# Patient Record
Sex: FEMALE | Race: BLACK OR AFRICAN AMERICAN | NOT HISPANIC OR LATINO | Employment: OTHER | URBAN - NONMETROPOLITAN AREA
[De-identification: names, ages, dates, MRNs, and addresses within clinical notes are randomized per-mention and may not be internally consistent; named-entity substitution may affect disease eponyms.]

---

## 2023-05-19 ENCOUNTER — HOSPITAL ENCOUNTER (INPATIENT)
Facility: HOSPITAL | Age: 73
LOS: 3 days | Discharge: HOME | End: 2023-05-23
Attending: INTERNAL MEDICINE | Admitting: FAMILY MEDICINE
Payer: MEDICARE

## 2023-05-19 PROBLEM — J90 PLEURAL EFFUSION: Status: ACTIVE | Noted: 2023-05-19

## 2023-05-19 PROCEDURE — G0378 HOSPITAL OBSERVATION PER HR: HCPCS

## 2023-05-19 RX ORDER — ASPIRIN 81 MG/1
81 TABLET, CHEWABLE ORAL DAILY
COMMUNITY

## 2023-05-19 RX ORDER — DIPHENHYDRAMINE HCL 25 MG
25 CAPSULE ORAL DAILY
COMMUNITY

## 2023-05-19 RX ORDER — LISINOPRIL 2.5 MG/1
2.5 TABLET ORAL 2 TIMES DAILY
COMMUNITY

## 2023-05-19 RX ORDER — SODIUM CHLORIDE 0.9 % (FLUSH) 0.9 %
10 SYRINGE (ML) INJECTION EVERY 12 HOURS SCHEDULED
Status: DISCONTINUED | OUTPATIENT
Start: 2023-05-19 | End: 2023-05-23 | Stop reason: HOSPADM

## 2023-05-19 RX ORDER — BISACODYL 10 MG
10 SUPPOSITORY, RECTAL RECTAL DAILY PRN
Status: DISCONTINUED | OUTPATIENT
Start: 2023-05-19 | End: 2023-05-23 | Stop reason: HOSPADM

## 2023-05-19 RX ORDER — SODIUM CHLORIDE 0.9 % (FLUSH) 0.9 %
10 SYRINGE (ML) INJECTION AS NEEDED
Status: DISCONTINUED | OUTPATIENT
Start: 2023-05-19 | End: 2023-05-23 | Stop reason: HOSPADM

## 2023-05-19 RX ORDER — CLOPIDOGREL BISULFATE 75 MG/1
75 TABLET ORAL DAILY
COMMUNITY

## 2023-05-19 RX ORDER — MULTIVIT-MIN/IRON/FOLIC ACID/K 18-600-40
1 CAPSULE ORAL
COMMUNITY

## 2023-05-19 RX ORDER — LISINOPRIL 2.5 MG/1
2.5 TABLET ORAL 2 TIMES DAILY
Status: DISCONTINUED | OUTPATIENT
Start: 2023-05-19 | End: 2023-05-23 | Stop reason: HOSPADM

## 2023-05-19 RX ORDER — NICOTINE POLACRILEX 4 MG
15 LOZENGE BUCCAL
Status: DISCONTINUED | OUTPATIENT
Start: 2023-05-19 | End: 2023-05-23 | Stop reason: HOSPADM

## 2023-05-19 RX ORDER — OMEPRAZOLE 40 MG/1
40 CAPSULE, DELAYED RELEASE ORAL DAILY
COMMUNITY

## 2023-05-19 RX ORDER — CARVEDILOL 25 MG/1
25 TABLET ORAL 2 TIMES DAILY WITH MEALS
Status: DISCONTINUED | OUTPATIENT
Start: 2023-05-19 | End: 2023-05-23 | Stop reason: HOSPADM

## 2023-05-19 RX ORDER — BISACODYL 5 MG/1
5 TABLET, DELAYED RELEASE ORAL DAILY PRN
Status: DISCONTINUED | OUTPATIENT
Start: 2023-05-19 | End: 2023-05-23 | Stop reason: HOSPADM

## 2023-05-19 RX ORDER — PANTOPRAZOLE SODIUM 40 MG/1
40 TABLET, DELAYED RELEASE ORAL
Status: DISCONTINUED | OUTPATIENT
Start: 2023-05-20 | End: 2023-05-23 | Stop reason: HOSPADM

## 2023-05-19 RX ORDER — NALOXONE HCL 0.4 MG/ML
0.4 VIAL (ML) INJECTION
Status: DISCONTINUED | OUTPATIENT
Start: 2023-05-19 | End: 2023-05-23 | Stop reason: HOSPADM

## 2023-05-19 RX ORDER — INSULIN ASPART 100 [IU]/ML
0-7 INJECTION, SOLUTION INTRAVENOUS; SUBCUTANEOUS
Status: DISCONTINUED | OUTPATIENT
Start: 2023-05-20 | End: 2023-05-23 | Stop reason: HOSPADM

## 2023-05-19 RX ORDER — INSULIN LISPRO 100 [IU]/ML
6 INJECTION, SOLUTION INTRAVENOUS; SUBCUTANEOUS DAILY
COMMUNITY

## 2023-05-19 RX ORDER — MORPHINE SULFATE 2 MG/ML
1 INJECTION, SOLUTION INTRAMUSCULAR; INTRAVENOUS EVERY 4 HOURS PRN
Status: DISCONTINUED | OUTPATIENT
Start: 2023-05-19 | End: 2023-05-22

## 2023-05-19 RX ORDER — ONDANSETRON 2 MG/ML
4 INJECTION INTRAMUSCULAR; INTRAVENOUS EVERY 6 HOURS PRN
Status: DISCONTINUED | OUTPATIENT
Start: 2023-05-19 | End: 2023-05-23 | Stop reason: HOSPADM

## 2023-05-19 RX ORDER — ONDANSETRON 4 MG/1
4 TABLET, FILM COATED ORAL EVERY 6 HOURS PRN
Status: DISCONTINUED | OUTPATIENT
Start: 2023-05-19 | End: 2023-05-23 | Stop reason: HOSPADM

## 2023-05-19 RX ORDER — CARVEDILOL 25 MG/1
25 TABLET ORAL 2 TIMES DAILY WITH MEALS
COMMUNITY

## 2023-05-19 RX ORDER — AMOXICILLIN 250 MG
2 CAPSULE ORAL 2 TIMES DAILY
Status: DISCONTINUED | OUTPATIENT
Start: 2023-05-19 | End: 2023-05-23 | Stop reason: HOSPADM

## 2023-05-19 RX ORDER — POLYETHYLENE GLYCOL 3350 17 G/17G
17 POWDER, FOR SOLUTION ORAL DAILY PRN
Status: DISCONTINUED | OUTPATIENT
Start: 2023-05-19 | End: 2023-05-23 | Stop reason: HOSPADM

## 2023-05-19 RX ORDER — INSULIN GLARGINE 100 [IU]/ML
7 INJECTION, SOLUTION SUBCUTANEOUS DAILY
COMMUNITY

## 2023-05-19 RX ORDER — SODIUM CHLORIDE 9 MG/ML
40 INJECTION, SOLUTION INTRAVENOUS AS NEEDED
Status: DISCONTINUED | OUTPATIENT
Start: 2023-05-19 | End: 2023-05-23 | Stop reason: HOSPADM

## 2023-05-19 RX ORDER — DEXTROSE MONOHYDRATE 25 G/50ML
25 INJECTION, SOLUTION INTRAVENOUS
Status: DISCONTINUED | OUTPATIENT
Start: 2023-05-19 | End: 2023-05-23 | Stop reason: HOSPADM

## 2023-05-19 RX ADMIN — LISINOPRIL 1.25 MG: 2.5 TABLET ORAL at 22:40

## 2023-05-19 RX ADMIN — CARVEDILOL 25 MG: 25 TABLET, FILM COATED ORAL at 22:42

## 2023-05-19 RX ADMIN — DOCUSATE SODIUM 50 MG AND SENNOSIDES 8.6 MG 2 TABLET: 8.6; 5 TABLET, FILM COATED ORAL at 22:42

## 2023-05-19 RX ADMIN — Medication 10 ML: at 23:15

## 2023-05-20 ENCOUNTER — APPOINTMENT (OUTPATIENT)
Dept: GENERAL RADIOLOGY | Facility: HOSPITAL | Age: 73
End: 2023-05-20
Payer: MEDICARE

## 2023-05-20 ENCOUNTER — APPOINTMENT (OUTPATIENT)
Dept: CT IMAGING | Facility: HOSPITAL | Age: 73
End: 2023-05-20
Payer: MEDICARE

## 2023-05-20 LAB
ANION GAP SERPL CALCULATED.3IONS-SCNC: 16 MMOL/L (ref 5–15)
BASOPHILS # BLD AUTO: 0.08 10*3/MM3 (ref 0–0.2)
BASOPHILS NFR BLD AUTO: 1.3 % (ref 0–1.5)
BUN SERPL-MCNC: 54 MG/DL (ref 8–23)
BUN/CREAT SERPL: 18.8 (ref 7–25)
CALCIUM SPEC-SCNC: 9.2 MG/DL (ref 8.6–10.5)
CHLORIDE SERPL-SCNC: 106 MMOL/L (ref 98–107)
CO2 SERPL-SCNC: 17 MMOL/L (ref 22–29)
CREAT SERPL-MCNC: 2.87 MG/DL (ref 0.57–1)
DEPRECATED RDW RBC AUTO: 54.5 FL (ref 37–54)
EGFRCR SERPLBLD CKD-EPI 2021: 16.8 ML/MIN/1.73
EOSINOPHIL # BLD AUTO: 0.18 10*3/MM3 (ref 0–0.4)
EOSINOPHIL NFR BLD AUTO: 2.8 % (ref 0.3–6.2)
ERYTHROCYTE [DISTWIDTH] IN BLOOD BY AUTOMATED COUNT: 15.8 % (ref 12.3–15.4)
GLUCOSE BLDC GLUCOMTR-MCNC: 108 MG/DL (ref 70–130)
GLUCOSE BLDC GLUCOMTR-MCNC: 112 MG/DL (ref 70–130)
GLUCOSE BLDC GLUCOMTR-MCNC: 128 MG/DL (ref 70–130)
GLUCOSE BLDC GLUCOMTR-MCNC: 131 MG/DL (ref 70–130)
GLUCOSE FLD-MCNC: 163 MG/DL
GLUCOSE SERPL-MCNC: 156 MG/DL (ref 65–99)
HBV SURFACE AG SERPL QL IA: NORMAL
HCT VFR BLD AUTO: 34.1 % (ref 34–46.6)
HGB BLD-MCNC: 11.5 G/DL (ref 12–15.9)
IMM GRANULOCYTES # BLD AUTO: 0.03 10*3/MM3 (ref 0–0.05)
IMM GRANULOCYTES NFR BLD AUTO: 0.5 % (ref 0–0.5)
LDH FLD-CCNC: 76 U/L
LYMPHOCYTES # BLD AUTO: 1.99 10*3/MM3 (ref 0.7–3.1)
LYMPHOCYTES NFR BLD AUTO: 31.1 % (ref 19.6–45.3)
MAGNESIUM SERPL-MCNC: 1.8 MG/DL (ref 1.6–2.4)
MCH RBC QN AUTO: 31.8 PG (ref 26.6–33)
MCHC RBC AUTO-ENTMCNC: 33.7 G/DL (ref 31.5–35.7)
MCV RBC AUTO: 94.2 FL (ref 79–97)
MONOCYTES # BLD AUTO: 0.66 10*3/MM3 (ref 0.1–0.9)
MONOCYTES NFR BLD AUTO: 10.3 % (ref 5–12)
NEUTROPHILS NFR BLD AUTO: 3.46 10*3/MM3 (ref 1.7–7)
NEUTROPHILS NFR BLD AUTO: 54 % (ref 42.7–76)
NRBC BLD AUTO-RTO: 0 /100 WBC (ref 0–0.2)
PLATELET # BLD AUTO: 292 10*3/MM3 (ref 140–450)
PMV BLD AUTO: 10.2 FL (ref 6–12)
POTASSIUM SERPL-SCNC: 4.4 MMOL/L (ref 3.5–5.2)
PROT FLD-MCNC: 1.2 G/DL
RBC # BLD AUTO: 3.62 10*6/MM3 (ref 3.77–5.28)
SODIUM SERPL-SCNC: 139 MMOL/L (ref 136–145)
WBC NRBC COR # BLD: 6.4 10*3/MM3 (ref 3.4–10.8)

## 2023-05-20 PROCEDURE — 83615 LACTATE (LD) (LDH) ENZYME: CPT | Performed by: INTERNAL MEDICINE

## 2023-05-20 PROCEDURE — 63710000001 INSULIN DETEMIR PER 5 UNITS: Performed by: FAMILY MEDICINE

## 2023-05-20 PROCEDURE — 82948 REAGENT STRIP/BLOOD GLUCOSE: CPT

## 2023-05-20 PROCEDURE — 71250 CT THORAX DX C-: CPT

## 2023-05-20 PROCEDURE — 99222 1ST HOSP IP/OBS MODERATE 55: CPT | Performed by: NURSE PRACTITIONER

## 2023-05-20 PROCEDURE — 84157 ASSAY OF PROTEIN OTHER: CPT | Performed by: INTERNAL MEDICINE

## 2023-05-20 PROCEDURE — 80048 BASIC METABOLIC PNL TOTAL CA: CPT | Performed by: FAMILY MEDICINE

## 2023-05-20 PROCEDURE — 25010000002 HYDROMORPHONE 1 MG/ML SOLUTION

## 2023-05-20 PROCEDURE — 25010000002 HYDRALAZINE PER 20 MG: Performed by: INTERNAL MEDICINE

## 2023-05-20 PROCEDURE — 82945 GLUCOSE OTHER FLUID: CPT | Performed by: INTERNAL MEDICINE

## 2023-05-20 PROCEDURE — G0257 UNSCHED DIALYSIS ESRD PT HOS: HCPCS

## 2023-05-20 PROCEDURE — 83735 ASSAY OF MAGNESIUM: CPT | Performed by: FAMILY MEDICINE

## 2023-05-20 PROCEDURE — 0W9930Z DRAINAGE OF RIGHT PLEURAL CAVITY WITH DRAINAGE DEVICE, PERCUTANEOUS APPROACH: ICD-10-PCS | Performed by: INTERNAL MEDICINE

## 2023-05-20 PROCEDURE — 25010000002 MORPHINE PER 10 MG: Performed by: FAMILY MEDICINE

## 2023-05-20 PROCEDURE — 87340 HEPATITIS B SURFACE AG IA: CPT | Performed by: INTERNAL MEDICINE

## 2023-05-20 PROCEDURE — 85025 COMPLETE CBC W/AUTO DIFF WBC: CPT | Performed by: FAMILY MEDICINE

## 2023-05-20 PROCEDURE — 71045 X-RAY EXAM CHEST 1 VIEW: CPT

## 2023-05-20 PROCEDURE — 32551 INSERTION OF CHEST TUBE: CPT | Performed by: INTERNAL MEDICINE

## 2023-05-20 PROCEDURE — 5A1D70Z PERFORMANCE OF URINARY FILTRATION, INTERMITTENT, LESS THAN 6 HOURS PER DAY: ICD-10-PCS | Performed by: INTERNAL MEDICINE

## 2023-05-20 RX ORDER — HYDRALAZINE HYDROCHLORIDE 20 MG/ML
20 INJECTION INTRAMUSCULAR; INTRAVENOUS EVERY 6 HOURS PRN
Status: DISCONTINUED | OUTPATIENT
Start: 2023-05-20 | End: 2023-05-23 | Stop reason: HOSPADM

## 2023-05-20 RX ORDER — HYDRALAZINE HYDROCHLORIDE 20 MG/ML
20 INJECTION INTRAMUSCULAR; INTRAVENOUS EVERY 6 HOURS PRN
Status: DISCONTINUED | OUTPATIENT
Start: 2023-05-20 | End: 2023-05-20

## 2023-05-20 RX ORDER — HYDRALAZINE HYDROCHLORIDE 20 MG/ML
20 INJECTION INTRAMUSCULAR; INTRAVENOUS ONCE
Status: COMPLETED | OUTPATIENT
Start: 2023-05-20 | End: 2023-05-20

## 2023-05-20 RX ORDER — LIDOCAINE HYDROCHLORIDE 10 MG/ML
INJECTION, SOLUTION EPIDURAL; INFILTRATION; INTRACAUDAL; PERINEURAL
Status: DISPENSED
Start: 2023-05-20 | End: 2023-05-21

## 2023-05-20 RX ADMIN — LISINOPRIL 2.5 MG: 2.5 TABLET ORAL at 20:57

## 2023-05-20 RX ADMIN — MORPHINE SULFATE 1 MG: 2 INJECTION, SOLUTION INTRAMUSCULAR; INTRAVENOUS at 22:12

## 2023-05-20 RX ADMIN — CARVEDILOL 25 MG: 25 TABLET, FILM COATED ORAL at 08:50

## 2023-05-20 RX ADMIN — HYDROMORPHONE HYDROCHLORIDE 1 MG: 1 INJECTION, SOLUTION INTRAMUSCULAR; INTRAVENOUS; SUBCUTANEOUS at 13:55

## 2023-05-20 RX ADMIN — LISINOPRIL 2.5 MG: 2.5 TABLET ORAL at 08:50

## 2023-05-20 RX ADMIN — Medication 10 ML: at 21:10

## 2023-05-20 RX ADMIN — HYDRALAZINE HYDROCHLORIDE 20 MG: 20 INJECTION INTRAMUSCULAR; INTRAVENOUS at 14:15

## 2023-05-20 RX ADMIN — DOCUSATE SODIUM 50 MG AND SENNOSIDES 8.6 MG 2 TABLET: 8.6; 5 TABLET, FILM COATED ORAL at 08:50

## 2023-05-20 RX ADMIN — Medication 10 ML: at 08:50

## 2023-05-20 RX ADMIN — MORPHINE SULFATE 1 MG: 2 INJECTION, SOLUTION INTRAMUSCULAR; INTRAVENOUS at 18:02

## 2023-05-20 RX ADMIN — HYDRALAZINE HYDROCHLORIDE 20 MG: 20 INJECTION INTRAMUSCULAR; INTRAVENOUS at 20:58

## 2023-05-20 RX ADMIN — Medication 1 MG: at 13:55

## 2023-05-20 NOTE — OP NOTE
OPERATIVE NOTE  Harmony Montoya  1950      PREOP DIAGNOSES:    POSTOP DIAGNOSES:  Large right pleural effusion  Worsening shortness of breath with dyspnea on exertion  Congestive heart failure  End-stage renal disease requiring hemodialysis or peritoneal dialysis    PROCEDURE:   1.  Right tube thoracostomy utilizing a 28 Macedonian chest tube  2.  Evacuation of the large right pleural effusion, 1500 cc, serous fluid    SURGEON:  Juan Miguel Adan MD      ANESTHESIA: Local with intravenous Dilaudid    ESTIMATED BLOOD LOSS: None    SPECIMEN: Fluid sent for microbiology and cytology purposes    COMPLICATIONS: None    DESCRIPTION OF OPERATION:   With the patient lying supine in her intensive care unit bed, the right chest was prepared and draped in the usual sterile fashion.  The patient had already been given intravenous Dilaudid.  20 cc of 1% lidocaine was injected intradermally as well as subcutaneously in the anterior axillary line at approximately the sixth intercostal space.  A short incision was made along the anesthetized site.  Blunt dissection was performed down to the level of the chest wall.  The interspace was identified.  Additional lidocaine was injected at this level.  The right pleural cavity was entered without incident with the use of a hemostat.  A large amount of serous non-malodorous fluid was present.  The 28 Macedonian chest tube was advanced into the right pleural cavity without incident or complication.  The chest tube was secured to the chest wall skin using 2 separate 0 silk sutures.  Specimens were collected for cytology as well as microbiology purposes.  All fluid was evacuated.  Approximately 1500 cc of serous fluid was evacuated immediately.  The chest tube was then placed to 20 cmH2O suction.  Sterile dressings were applied.  She tolerated the procedure well.  There are no complications.    Juan Miguel Adan MD

## 2023-05-20 NOTE — PROGRESS NOTES
CTVS DAILY NOTE     LOS: 0 days          Patient Care Team:  Provider, No Known as PCP - General    Chief Complaint: shortness of breath    CT of the chest reviewed:  Moderately large free flowing RIGHT pleural effusion.  This does not appear to be loculated.    Vital Signs  Temp:  [96.5 °F (35.8 °C)-98 °F (36.7 °C)] 97.3 °F (36.3 °C)  Heart Rate:  [63-67] 63  Resp:  [16-18] 18  BP: (160-180)/(76-86) 180/76  Body mass index is 23.7 kg/m².    Intake/Output Summary (Last 24 hours) at 5/20/2023 1358  Last data filed at 5/20/2023 0350  Gross per 24 hour   Intake --   Output 100 ml   Net -100 ml     No intake/output data recorded.    Wt Readings from Last 3 Encounters:   05/19/23 60.7 kg (133 lb 12.8 oz)       Objective     Diminished breath sounds - right chest    Results Review:      WBC No results found for: WBCS   HGB Hemoglobin   Date/Time Value Ref Range Status   05/20/2023 0424 11.5 (L) 12.0 - 15.9 g/dL Final      HCT Hematocrit   Date/Time Value Ref Range Status   05/20/2023 0424 34.1 34.0 - 46.6 % Final      Platlets No results found for: LABPLAT     PT/INR:  No results found for: PROTIME/No results found for: INR    Sodium Sodium   Date/Time Value Ref Range Status   05/20/2023 0424 139 136 - 145 mmol/L Final      Potassium Potassium   Date/Time Value Ref Range Status   05/20/2023 0424 4.4 3.5 - 5.2 mmol/L Final      Chloride Chloride   Date/Time Value Ref Range Status   05/20/2023 0424 106 98 - 107 mmol/L Final      Bicarbonate No results found for: PLASMABICARB   BUN BUN   Date/Time Value Ref Range Status   05/20/2023 0424 54 (H) 8 - 23 mg/dL Final      Creatinine Creatinine   Date/Time Value Ref Range Status   05/20/2023 0424 2.87 (H) 0.57 - 1.00 mg/dL Final      Calcium Calcium   Date/Time Value Ref Range Status   05/20/2023 0424 9.2 8.6 - 10.5 mg/dL Final      Magnesium Magnesium   Date/Time Value Ref Range Status   05/20/2023 0424 1.8 1.6 - 2.4 mg/dL Final        Imaging Results (Last 72 Hours)      Procedure Component Value Units Date/Time    CT Chest Without Contrast Diagnostic [[id number]] Collected: 05/20/23 0923     Updated: 05/20/23 0928    Narrative:      TECHNIQUE:  Axial images from the thoracic inlet through the levels of the diaphragms were  performed followed by 2-D multiplanar reformats.  No contrast.    FINDINGS:  No pathologically enlarged mediastinal, hilar, or axillary lymph nodes.  There  is a large right pleural effusion with right basilar consolidation.      Impression:      Large right pleural effusion with right basilar consolidation.  The left lung is  clear.      XR Chest 1 View [[id number]] Collected: 05/20/23 0802     Updated: 05/20/23 0814    Narrative:      COMPARISON:  No comparison.    HISTORY:  Pleural effusion.      Impression:      FINDINGS/IMPRESSION:  1.   Cardiomegaly.  Moderate-sized right pleural effusion with underlying right  perihilar and right basilar airspace disease which could represent pneumonia,  atelectasis, or a combination thereof.  An underlying obstructing lesion or mass  could also be present.    2.  Recommend short-term follow-up to document complete resolution.  Alternatively, this could be further evaluated with dedicated CT scan of the  chest with contrast, as indicated.              carvedilol, 25 mg, Oral, BID With Meals  Insulin Aspart, 0-7 Units, Subcutaneous, TID AC  insulin detemir, 7 Units, Subcutaneous, Nightly  lidocaine PF 1%, , ,   lidocaine PF 1%, , ,   lisinopril, 2.5 mg, Oral, BID  pantoprazole, 40 mg, Oral, Q AM  senna-docusate sodium, 2 tablet, Oral, BID  sodium chloride, 10 mL, Intravenous, Q12H             Patient Active Problem List   Diagnosis Code   • Pleural effusion J90       Assessment & Plan    1.  Transfer to the ICU  2.  RIGHT tube thoracostomy is planned.  She is aware of the risk, benefits, and alternatives of the planned procedure and wishes to proceed.  3.  HD or PD per renal    Juan Miguel Adan MD  05/20/23  13:58  CDT

## 2023-05-20 NOTE — PLAN OF CARE
Goal Outcome Evaluation:  Plan of Care Reviewed With: patient        Progress: no change  Outcome Evaluation: transfer from [facility]. currently not requiring any oxygen. SOB reported on exertion. Blood presssure has been elevated, nite time medications given. will continue to monitor

## 2023-05-20 NOTE — H&P
[facility] Medicine Admission      Date of Admission: 5/19/2023      Primary Care Physician: Provider, No Known      Chief Complaint: Shortness of air    HPI:    This is a 73-year-old female with past medical history of end-stage renal disease on peritoneal dialysis, hypertension, type 2 diabetes presenting to the ER at outside facility with shortness of air.  Patient was found to have right-sided pleural effusion on imaging in the ER.  She had recently started peritoneal dialysis about 1 week ago and prior to that she was on hemodialysis.  She denies any chest pain.    Past Medical History: Type 2 diabetes, hypertension, end-stage renal disease    Past Surgical History: Dialysis port    Family History: Father has hypertension    Social History:  Non-smoker    Allergies: No known allergies    Medications: Scheduled Meds:carvedilol, 25 mg, Oral, BID With Meals  insulin detemir, 7 Units, Subcutaneous, Nightly  lisinopril, 2.5 mg, Oral, BID  [START ON 5/20/2023] pantoprazole, 40 mg, Oral, Q AM  senna-docusate sodium, 2 tablet, Oral, BID  sodium chloride, 10 mL, Intravenous, Q12H      Continuous Infusions:   PRN Meds:.•  senna-docusate sodium **AND** polyethylene glycol **AND** bisacodyl **AND** bisacodyl  •  Morphine **AND** naloxone  •  ondansetron **OR** ondansetron  •  sodium chloride  •  sodium chloride  No current facility-administered medications on file prior to encounter.     Current Outpatient Medications on File Prior to Encounter   Medication Sig Dispense Refill   • aspirin 81 MG chewable tablet Chew 1 tablet Daily.     • Ascorbic Acid (Vitamin C) 500 MG capsule Take 1 capsule by mouth.     • carvedilol (COREG) 25 MG tablet Take 1 tablet by mouth 2 (Two) Times a Day With Meals.     • clopidogrel (PLAVIX) 75 MG tablet Take 1 tablet by mouth Daily.     • diphenhydrAMINE (BENADRYL) 25 mg capsule Take 1 capsule by mouth Daily.     • insulin glargine (LANTUS, SEMGLEE)  100 UNIT/ML injection Inject 7 Units under the skin into the appropriate area as directed Daily.     • Insulin Lispro (humaLOG) 100 UNIT/ML injection Inject 6 Units under the skin into the appropriate area as directed Daily.     • lisinopril (PRINIVIL,ZESTRIL) 2.5 MG tablet Take 1 tablet by mouth 2 (Two) Times a Day.     • omeprazole (priLOSEC) 40 MG capsule Take 1 capsule by mouth Daily.     • vitamin D3 125 MCG (5000 UT) capsule capsule Take 1,000 Units by mouth Daily.         Review of Systems:  Review of Systems   Respiratory: Positive for shortness of breath.    Cardiovascular: Negative for chest pain.      Otherwise complete ROS is negative except as mentioned above.    Physical Exam:   Temp:  [98 °F (36.7 °C)] 98 °F (36.7 °C)  Heart Rate:  [65] 65  Resp:  [16] 16  BP: (180)/(81) 180/81  Physical Exam  Vitals and nursing note reviewed.   Constitutional:       General: She is not in acute distress.     Appearance: She is well-developed. She is not diaphoretic.   HENT:      Head: Normocephalic and atraumatic.   Cardiovascular:      Rate and Rhythm: Normal rate.   Pulmonary:      Effort: Pulmonary effort is normal. No respiratory distress.      Breath sounds: No wheezing.   Abdominal:      General: There is no distension.      Palpations: Abdomen is soft.   Musculoskeletal:         General: Normal range of motion.   Skin:     General: Skin is warm and dry.   Neurological:      Mental Status: She is alert.      Cranial Nerves: No cranial nerve deficit.   Psychiatric:         Behavior: Behavior normal.         Thought Content: Thought content normal.         Judgment: Judgment normal.           Results Reviewed:  I have personally reviewed current lab, radiology, and data and agree with results.  Lab Results (last 24 hours)     ** No results found for the last 24 hours. **        Imaging Results (Last 24 Hours)     ** No results found for the last 24 hours. **            Assessment:    Active Hospital Problems     Diagnosis    • **Pleural effusion        Right-sided pleural effusion-we will consult Dr. Cox, we will await further recommendations.  We will keep n.p.o. for now.  Hold antiplatelet therapy    Essential hypertension-continue with home medication and continue monitoring blood pressure    Type 2 diabetes-sliding scale insulin and continue monitoring glucose level    End-stage renal disease-patient is on peritoneal dialysis we will consult nephrology    DVT prophylaxis-SCD    Medical Decision Making  Number and Complexity of problems: 1 major complex  Differential Diagnosis: Pneumonia    Conditions and Status:        Condition is unchanged.     [facility] Data  External documents reviewed: Previous medical records            Discussed with: Hospitalist     Treatment Plan  As above    Care Planning  Shared decision making: Patient  Code status and discussions: Full code    Disposition  Social Determinants of Health that impact treatment or disposition: None  I expect the patient to be discharged to home in 1-2 days.      I have utilized all available immediate resources to obtain, update, or review the patient's current medications (including all prescriptions, over-the-counter products, herbals, cannabis/cannabidiol products, and vitamin/mineral/dietary (nutritional) supplements).     I confirmed that the patient's Advance Care Plan is present, code status is documented, or surrogate decision maker is listed in the patient's medical record.       Song Rivero MD  05/19/23  21:33 CDT

## 2023-05-20 NOTE — CONSULTS
CVTS CONSULTATION          Patient Care Team:  Provider, No Known as PCP - General     Requesting Provider: Dr. Rivero requesting Dr. Adan    Chief complaint: Dyspea      SUBJECTIVE       History of Present Illness:  73 y.o. female with HTN(stable, increased risk stroke, rupture), Hyperlipidemia(stable, increased risk cardiovascular events), Diabetes Mellitus(stable, increased risk cardiovascular events) and Chronic Kidney Disease(stable, increased risk renal failure) HFrEF, ESRD on dialysis, former hemodialysis patient, prior surgical history includes cardiac PCI AV fistula placement by Dr. Iraheta. former smoker.  1 week history of shortness of breath, worse when lying flat.  Patient recently switched from hemodialysis to peritoneal dialysis this week, reported to emergency department at [facility] chest film demonstrated right pleural effusion transferred to [facility] for CT surgery consideration.  Most of patient's cardiac history performed at [facility] in [place].  No other associated symptoms or modifying factors.    4/2022 TTE: EF 40%  4/2022 CABGx1 CAROLINE, Dr. Post [facility]  5/2023 CXR: Moderate right pleural effusion somewhat loculated    The following portions of the patient's history were reviewed and updated as appropriate: allergies, current medications, past family history, past medical history, past social history, past surgical history and problem list.  Recent images independently reviewed.  Available laboratory values reviewed.    Review of Systems   Constitutional: Negative for activity change and diaphoresis.   HENT: Negative for congestion.    Respiratory: Positive for shortness of breath.    Cardiovascular: Positive for leg swelling.   Gastrointestinal: Negative for abdominal pain.   Endocrine: Negative for polyuria.   Genitourinary: Negative for difficulty urinating.   Musculoskeletal: Positive for arthralgias and back pain.   Skin: Negative for color change and  wound.   Allergic/Immunologic: Negative for immunocompromised state.   Hematological: Bruises/bleeds easily.   Psychiatric/Behavioral: Negative for agitation and confusion.        History reviewed. No pertinent past medical history.  History reviewed. No pertinent surgical history.  History reviewed. No pertinent family history.  Social History     Tobacco Use   • Smoking status: Former     Types: Cigarettes     Quit date: 2021     Years since quittin.0   • Smokeless tobacco: Never   Vaping Use   • Vaping Use: Never used   Substance Use Topics   • Alcohol use: Never   • Drug use: Never     Medications Prior to Admission   Medication Sig Dispense Refill Last Dose   • aspirin 81 MG chewable tablet Chew 1 tablet Daily.   2023   • Ascorbic Acid (Vitamin C) 500 MG capsule Take 1 capsule by mouth.      • carvedilol (COREG) 25 MG tablet Take 1 tablet by mouth 2 (Two) Times a Day With Meals.      • clopidogrel (PLAVIX) 75 MG tablet Take 1 tablet by mouth Daily.      • diphenhydrAMINE (BENADRYL) 25 mg capsule Take 1 capsule by mouth Daily.      • insulin glargine (LANTUS, SEMGLEE) 100 UNIT/ML injection Inject 7 Units under the skin into the appropriate area as directed Daily.      • Insulin Lispro (humaLOG) 100 UNIT/ML injection Inject 6 Units under the skin into the appropriate area as directed Daily.      • lisinopril (PRINIVIL,ZESTRIL) 2.5 MG tablet Take 1 tablet by mouth 2 (Two) Times a Day.      • omeprazole (priLOSEC) 40 MG capsule Take 1 capsule by mouth Daily.      • vitamin D3 125 MCG (5000 UT) capsule capsule Take 1,000 Units by mouth Daily.        carvedilol, 25 mg, Oral, BID With Meals  Insulin Aspart, 0-7 Units, Subcutaneous, TID AC  insulin detemir, 7 Units, Subcutaneous, Nightly  lisinopril, 2.5 mg, Oral, BID  pantoprazole, 40 mg, Oral, Q AM  senna-docusate sodium, 2 tablet, Oral, BID  sodium chloride, 10 mL, Intravenous, Q12H      Allergies:  Statins    OBJECTIVE        Vital Signs  Temp:  [96.5  "°F (35.8 °C)-98 °F (36.7 °C)] 96.5 °F (35.8 °C)  Heart Rate:  [63-67] 63  Resp:  [16-18] 18  BP: (160-180)/(80-86) 179/80    Flowsheet Rows    Flowsheet Row First Filed Value   Admission Height 160 cm (63\") Documented at 05/19/2023 2050   Admission Weight 60.7 kg (133 lb 12.8 oz) Documented at 05/19/2023 2050        160 cm (63\")    Physical Exam  Vitals and nursing note reviewed.   Constitutional:       Appearance: She is not ill-appearing.   HENT:      Head: Normocephalic.      Nose: Nose normal.      Mouth/Throat:      Mouth: Mucous membranes are moist.   Eyes:      Extraocular Movements: Extraocular movements intact.      Pupils: Pupils are equal, round, and reactive to light.   Cardiovascular:      Rate and Rhythm: Normal rate.   Pulmonary:      Effort: Pulmonary effort is normal.      Comments: Decreased RLL  Abdominal:      General: Abdomen is flat.      Palpations: Abdomen is soft.      Comments: PD catheter   Musculoskeletal:      Right lower leg: Edema present.      Left lower leg: Edema present.   Skin:     General: Skin is warm and dry.      Capillary Refill: Capillary refill takes 2 to 3 seconds.   Neurological:      Mental Status: She is alert. Mental status is at baseline.   Psychiatric:         Mood and Affect: Mood normal.         Results Review:   Lab Results (last 24 hours)     Procedure Component Value Units Date/Time    POC Glucose Once [[id number]]  (Abnormal) Collected: 05/20/23 0613    Specimen: Blood Updated: 05/20/23 0700     Glucose 131 mg/dL      Comment: Result Not ConfirmedOperator: [id number] Eros JODIMeter ID: [id number]       Magnesium [[id number]]  (Normal) Collected: 05/20/23 0424    Specimen: Blood Updated: 05/20/23 0504     Magnesium 1.8 mg/dL     Basic Metabolic Panel [[id number]]  (Abnormal) Collected: 05/20/23 0424    Specimen: Blood Updated: 05/20/23 0504     Glucose 156 mg/dL      BUN 54 mg/dL      Creatinine 2.87 mg/dL      Sodium 139 mmol/L      Potassium 4.4 mmol/L      " Chloride 106 mmol/L      CO2 17.0 mmol/L      Calcium 9.2 mg/dL      BUN/Creatinine Ratio 18.8     Anion Gap 16.0 mmol/L      eGFR 16.8 mL/min/1.73     Narrative:      GFR Normal >60  Chronic Kidney Disease <60  Kidney Failure <15    The GFR formula is only valid for adults with stable renal function between ages 18 and 70.    CBC & Differential [[id number]]  (Abnormal) Collected: 05/20/23 0424    Specimen: Blood Updated: 05/20/23 0448    Narrative:      The following orders were created for panel order CBC & Differential.  Procedure                               Abnormality         Status                     ---------                               -----------         ------                     CBC Auto Differential[[id number]]        Abnormal            Final result                 Please view results for these tests on the individual orders.    CBC Auto Differential [[id number]]  (Abnormal) Collected: 05/20/23 0424    Specimen: Blood Updated: 05/20/23 0448     WBC 6.40 10*3/mm3      RBC 3.62 10*6/mm3      Hemoglobin 11.5 g/dL      Hematocrit 34.1 %      MCV 94.2 fL      MCH 31.8 pg      MCHC 33.7 g/dL      RDW 15.8 %      RDW-SD 54.5 fl      MPV 10.2 fL      Platelets 292 10*3/mm3      Neutrophil % 54.0 %      Lymphocyte % 31.1 %      Monocyte % 10.3 %      Eosinophil % 2.8 %      Basophil % 1.3 %      Immature Grans % 0.5 %      Neutrophils, Absolute 3.46 10*3/mm3      Lymphocytes, Absolute 1.99 10*3/mm3      Monocytes, Absolute 0.66 10*3/mm3      Eosinophils, Absolute 0.18 10*3/mm3      Basophils, Absolute 0.08 10*3/mm3      Immature Grans, Absolute 0.03 10*3/mm3      nRBC 0.0 /100 WBC         Imaging Results (Last 24 Hours)     Procedure Component Value Units Date/Time    XR Chest 1 View [[id number]] Collected: 05/20/23 0802     Updated: 05/20/23 0814    Narrative:      COMPARISON:  No comparison.    HISTORY:  Pleural effusion.      Impression:      FINDINGS/IMPRESSION:  1.   Cardiomegaly.  Moderate-sized right  pleural effusion with underlying right  perihilar and right basilar airspace disease which could represent pneumonia,  atelectasis, or a combination thereof.  An underlying obstructing lesion or mass  could also be present.    2.  Recommend short-term follow-up to document complete resolution.  Alternatively, this could be further evaluated with dedicated CT scan of the  chest with contrast, as indicated.                ASSESSMENT/PLAN         Pleural effusion      Assessment & Plan    Right pleural effusion  New onset of shortness of breath.  Patient is well-perfused and saturating well.  Recent transition to peritoneal dialysis this past week, effusion could be the result of hypervolemia during transition.  Other potential causes include infection, malignancy, or the result of her heart failure.      Nephrology on board    Fluid appears somewhat loculated will obtain CT chest to evaluate.  Will likely need drainage via chest tube thoracostomy, Dr. Adan to review CT once complete.     Thank you for the opportunity to participate in this patient's care.       I discussed the patient's findings and my recommendations with Dr. Adan            This document has been electronically signed by Rohan Centeno, AGACNP-BC @  On May 20, 2023 08:43 CDT

## 2023-05-20 NOTE — CONSULTS
"[facility]  [address] [place] [address]. [id number]   - [phone]  F - [phone]     Consultation         PATIENT  DEMOGRAPHICS   PATIENT NAME: Harmony Montoya                      PHYSICIAN: Brunilda Knott MD  : 1950  MRN: [id number]    Subjective   SUBJECTIVE   Referring Provider: Dr pierson  Reason for Consultation: ESRD on HD/PD  History of present illness:      73-year-old female with past medical history of end-stage renal disease on peritoneal dialysis, hypertension, type 2 diabetes presenting to the ER at outside facility with shortness of air.  Patient was found to have right-sided pleural effusion on imaging in the ER.  She had recently started peritoneal dialysis about 1 week ago and prior to that she was on hemodialysis.  She denies any chest pain.     Renal standpoint she has been on PD, last PD was Thursday.    History reviewed. No pertinent past medical history.  History reviewed. No pertinent surgical history.  History reviewed. No pertinent family history.  Social History     Tobacco Use   • Smoking status: Former     Types: Cigarettes     Quit date: 2021     Years since quittin.0   • Smokeless tobacco: Never   Vaping Use   • Vaping Use: Never used   Substance Use Topics   • Alcohol use: Never   • Drug use: Never     Allergies:  Statins     REVIEW OF SYSTEMS    Review of Systems   All other systems reviewed and are negative.      Objective   OBJECTIVE   Vital Signs  Temp:  [96.5 °F (35.8 °C)-98 °F (36.7 °C)] 97.3 °F (36.3 °C)  Heart Rate:  [63-67] 63  Resp:  [16-18] 18  BP: (160-180)/(76-86) 180/76    Flowsheet Rows    Flowsheet Row First Filed Value   Admission Height 160 cm (63\") Documented at 2023   Admission Weight 60.7 kg (133 lb 12.8 oz) Documented at 2023           I/O last 3 completed shifts:  In: -   Out: 100 [Urine:100]    PHYSICAL EXAM    Physical Exam  Vitals reviewed.   Constitutional:       Appearance: Normal " appearance.   HENT:      Head: Normocephalic.      Nose: Nose normal.      Mouth/Throat:      Mouth: Mucous membranes are moist.   Pulmonary:      Effort: Pulmonary effort is normal.      Breath sounds: Normal breath sounds.      Comments: Right side absent of breath sounds in lower and middle lung zones  Abdominal:      General: Abdomen is flat. Bowel sounds are normal.      Palpations: Abdomen is soft.   Musculoskeletal:         General: Normal range of motion.      Cervical back: Normal range of motion.   Skin:     General: Skin is warm.   Neurological:      General: No focal deficit present.      Mental Status: She is alert.   Psychiatric:         Mood and Affect: Mood normal.         RESULTS   Results Review:    Results from last 7 days   Lab Units 05/20/23  0424   SODIUM mmol/L 139   POTASSIUM mmol/L 4.4   CHLORIDE mmol/L 106   CO2 mmol/L 17.0*   BUN mg/dL 54*   CREATININE mg/dL 2.87*   CALCIUM mg/dL 9.2   GLUCOSE mg/dL 156*       Estimated Creatinine Clearance: 16.7 mL/min (A) (by C-G formula based on SCr of 2.87 mg/dL (H)).    Results from last 7 days   Lab Units 05/20/23  0424   MAGNESIUM mg/dL 1.8             Results from last 7 days   Lab Units 05/20/23  0424   WBC 10*3/mm3 6.40   HEMOGLOBIN g/dL 11.5*   PLATELETS 10*3/mm3 292              MEDICATIONS    carvedilol, 25 mg, Oral, BID With Meals  Insulin Aspart, 0-7 Units, Subcutaneous, TID AC  insulin detemir, 7 Units, Subcutaneous, Nightly  lisinopril, 2.5 mg, Oral, BID  pantoprazole, 40 mg, Oral, Q AM  senna-docusate sodium, 2 tablet, Oral, BID  sodium chloride, 10 mL, Intravenous, Q12H         Medications Prior to Admission   Medication Sig Dispense Refill Last Dose   • aspirin 81 MG chewable tablet Chew 1 tablet Daily.   5/19/2023   • Ascorbic Acid (Vitamin C) 500 MG capsule Take 1 capsule by mouth.      • carvedilol (COREG) 25 MG tablet Take 1 tablet by mouth 2 (Two) Times a Day With Meals.      • clopidogrel (PLAVIX) 75 MG tablet Take 1 tablet by  mouth Daily.      • diphenhydrAMINE (BENADRYL) 25 mg capsule Take 1 capsule by mouth Daily.      • insulin glargine (LANTUS, SEMGLEE) 100 UNIT/ML injection Inject 7 Units under the skin into the appropriate area as directed Daily.      • Insulin Lispro (humaLOG) 100 UNIT/ML injection Inject 6 Units under the skin into the appropriate area as directed Daily.      • lisinopril (PRINIVIL,ZESTRIL) 2.5 MG tablet Take 1 tablet by mouth 2 (Two) Times a Day.      • omeprazole (priLOSEC) 40 MG capsule Take 1 capsule by mouth Daily.      • vitamin D3 125 MCG (5000 UT) capsule capsule Take 1,000 Units by mouth Daily.        Assessment & Plan   ASSESSMENT / PLAN      Pleural effusion    1. End-stage renal disease on PD   - currently training for PD, last pd on thursday  - Has left UE AV fistula for HD  -We will continue dialysis while inpatient, will do HD today 2 hours then monday  -Will send pleural fluid for glucose, protein and LDH to rule out peritoneal fluid leak to pleural space   -Renal diet    2. Hypertension  -Will Monitor    3. Anemia  -Monitor hemoglobin transfuse if hemoglobin less than 7    4. Volume overload  -We will manage volume with dialysis    5. Hyperkalemia  -We will manage electrolytes including potassium with dialysis    6. Severe right sided pleural effusion  Plan for ct guided thoracentesis most likely   Ordered plueral analysis for glucose, protein and ldh to rule out peritoneal fluid leak to pleural space     Thank you for the consult we will continue to follow the patient.  Let me know if you have any questions         I discussed the patients findings and my recommendations with patient    This document has been electronically signed by Brunilda Knott MD on May 20, 2023 12:59 CDT

## 2023-05-20 NOTE — PROGRESS NOTES
[facility] HOSPITALIST PROGRESS NOTE     Patient Identification:  Name:  Harmony Montoya  Age:  73 y.o.  Sex:  female  :  1950  MRN:  [id number]  Visit Number:  [id number]  Primary Care Provider:  Provider, No Known    Length of stay:  0        Subjective:    Seen and evaluated with the nursing staff.  Patient denies any chest pain or shortness of breath at time of evaluation.  Her shortness of breath is mostly at night.  ----------------------------------------------------------------------------------------------------------------------  [facility] Meds:  carvedilol, 25 mg, Oral, BID With Meals  Insulin Aspart, 0-7 Units, Subcutaneous, TID AC  insulin detemir, 7 Units, Subcutaneous, Nightly  lisinopril, 2.5 mg, Oral, BID  pantoprazole, 40 mg, Oral, Q AM  senna-docusate sodium, 2 tablet, Oral, BID  sodium chloride, 10 mL, Intravenous, Q12H         ----------------------------------------------------------------------------------------------------------------------  Vital Signs:  Temp:  [96.5 °F (35.8 °C)-98 °F (36.7 °C)] 97.3 °F (36.3 °C)  Heart Rate:  [63-67] 63  Resp:  [16-18] 18  BP: (160-180)/(76-86) 180/76      23   Weight: 60.7 kg (133 lb 12.8 oz)     Body mass index is 23.7 kg/m².    Intake/Output Summary (Last 24 hours) at 2023 1234  Last data filed at 2023 0350  Gross per 24 hour   Intake --   Output 100 ml   Net -100 ml     NPO Diet NPO Type: Strict NPO  ----------------------------------------------------------------------------------------------------------------------  Physical exam:  Constitutional: Elderly woman, not in acute distress.       HENT:  Head:  Normocephalic and atraumatic.  Mouth: Dry  mucous membranes.    Eyes:  Conjunctivae and EOM are normal.  Pupils are equal, round, and reactive to light.  No scleral icterus.   Neck:  Neck supple.  No JVD present.    Cardiovascular:  Normal rate, regular rhythm and normal heart sounds  with no murmur.  Pulmonary/Chest:  No respiratory distress, no wheezes, no crackles, with normal breath sound left hemithorax.  Markedly diminished breath sounds on the right hemithorax.  Abdominal:  Soft.  Bowel sounds are normal.  No distension and no tenderness.  Right-sided PD catheter in place  Musculoskeletal: Trace bilateral lower extremity edema, no tenderness, and no deformity.  No red or swollen joints anywhere.    Neurological:  Alert and oriented to person, place, and time.  No cranial nerve deficit.  No tongue deviation.  No facial droop.  No slurred speech.   Skin:  Skin is warm and dry. No rash noted. No pallor.  Left upper extremity AV fistula with good thrill  Peripheral vascular:  Strong pulses in all 4 extremities with no clubbing, no cyanosis, trace edema.  Genitourinary: Deferred  ----------------------------------------------------------------------------------------------------------------------  Tele:    ----------------------------------------------------------------------------------------------------------------------      Results from last 7 days   Lab Units 05/20/23  0424   WBC 10*3/mm3 6.40   HEMOGLOBIN g/dL 11.5*   HEMATOCRIT % 34.1   MCV fL 94.2   MCHC g/dL 33.7   PLATELETS 10*3/mm3 292         Results from last 7 days   Lab Units 05/20/23  0424   SODIUM mmol/L 139   POTASSIUM mmol/L 4.4   MAGNESIUM mg/dL 1.8   CHLORIDE mmol/L 106   CO2 mmol/L 17.0*   BUN mg/dL 54*   CREATININE mg/dL 2.87*   CALCIUM mg/dL 9.2   GLUCOSE mg/dL 156*   Estimated Creatinine Clearance: 16.7 mL/min (A) (by C-G formula based on SCr of 2.87 mg/dL (H)).    No results found for: AMMONIA      No results found for: BLOODCX  No results found for: URINECX  No results found for: WOUNDCX  No results found for: STOOLCX    I have personally looked at the labs and they are summarized above.  ----------------------------------------------------------------------------------------------------------------------  Imaging  Results (Last 24 Hours)     Procedure Component Value Units Date/Time    CT Chest Without Contrast Diagnostic [[id number]] Collected: 05/20/23 0923     Updated: 05/20/23 0928    Narrative:      TECHNIQUE:  Axial images from the thoracic inlet through the levels of the diaphragms were  performed followed by 2-D multiplanar reformats.  No contrast.    FINDINGS:  No pathologically enlarged mediastinal, hilar, or axillary lymph nodes.  There  is a large right pleural effusion with right basilar consolidation.      Impression:      Large right pleural effusion with right basilar consolidation.  The left lung is  clear.      XR Chest 1 View [[id number]] Collected: 05/20/23 0802     Updated: 05/20/23 0814    Narrative:      COMPARISON:  No comparison.    HISTORY:  Pleural effusion.      Impression:      FINDINGS/IMPRESSION:  1.   Cardiomegaly.  Moderate-sized right pleural effusion with underlying right  perihilar and right basilar airspace disease which could represent pneumonia,  atelectasis, or a combination thereof.  An underlying obstructing lesion or mass  could also be present.    2.  Recommend short-term follow-up to document complete resolution.  Alternatively, this could be further evaluated with dedicated CT scan of the  chest with contrast, as indicated.          ----------------------------------------------------------------------------------------------------------------------  Assessment and Plan:  Right pleural effusion.  CT surgery on consult.  Patient likely to have thoracentesis or chest tube placement.  Appreciate CT surgery input and recommendation.    Uncontrolled hypertension.  Resume patient's home medication.  IV hydralazine 20 mg every 4 hours as needed for systolic blood pressure greater than 160.    End-stage renal disease on hemodialysis/peritoneal dialysis.  Nephrology consulted.  Appreciate nephrology input and recommendation.    Diabetes mellitus.  Glucose checks.  Insulin sliding scale/basal  insulin.    Prophylaxis:  DVT-SCDs.    Disposition:  Awaiting thoracentesis or chest tube placement.  Plan of care was discussed with patient and the nursing staff.    Geovani Zhao MD  05/20/23  12:34 CDT     Dragon disclaimer:  Part of this note may be an electronic transcription/translation of spoken language to printed text using the Dragon Dictation System.

## 2023-05-21 ENCOUNTER — APPOINTMENT (OUTPATIENT)
Dept: GENERAL RADIOLOGY | Facility: HOSPITAL | Age: 73
End: 2023-05-21
Payer: MEDICARE

## 2023-05-21 LAB
ANION GAP SERPL CALCULATED.3IONS-SCNC: 16 MMOL/L (ref 5–15)
BASOPHILS # BLD AUTO: 0.05 10*3/MM3 (ref 0–0.2)
BASOPHILS NFR BLD AUTO: 0.6 % (ref 0–1.5)
BUN SERPL-MCNC: 38 MG/DL (ref 8–23)
BUN/CREAT SERPL: 14.8 (ref 7–25)
CALCIUM SPEC-SCNC: 8.6 MG/DL (ref 8.6–10.5)
CHLORIDE SERPL-SCNC: 103 MMOL/L (ref 98–107)
CO2 SERPL-SCNC: 21 MMOL/L (ref 22–29)
CREAT SERPL-MCNC: 2.56 MG/DL (ref 0.57–1)
DEPRECATED RDW RBC AUTO: 56 FL (ref 37–54)
EGFRCR SERPLBLD CKD-EPI 2021: 19.3 ML/MIN/1.73
EOSINOPHIL # BLD AUTO: 0.12 10*3/MM3 (ref 0–0.4)
EOSINOPHIL NFR BLD AUTO: 1.4 % (ref 0.3–6.2)
ERYTHROCYTE [DISTWIDTH] IN BLOOD BY AUTOMATED COUNT: 16.2 % (ref 12.3–15.4)
GLUCOSE BLDC GLUCOMTR-MCNC: 148 MG/DL (ref 70–130)
GLUCOSE BLDC GLUCOMTR-MCNC: 171 MG/DL (ref 70–130)
GLUCOSE BLDC GLUCOMTR-MCNC: 214 MG/DL (ref 70–130)
GLUCOSE BLDC GLUCOMTR-MCNC: 324 MG/DL (ref 70–130)
GLUCOSE SERPL-MCNC: 161 MG/DL (ref 65–99)
HCT VFR BLD AUTO: 38.5 % (ref 34–46.6)
HGB BLD-MCNC: 12.6 G/DL (ref 12–15.9)
IMM GRANULOCYTES # BLD AUTO: 0.03 10*3/MM3 (ref 0–0.05)
IMM GRANULOCYTES NFR BLD AUTO: 0.3 % (ref 0–0.5)
LYMPHOCYTES # BLD AUTO: 1.49 10*3/MM3 (ref 0.7–3.1)
LYMPHOCYTES NFR BLD AUTO: 16.9 % (ref 19.6–45.3)
MAGNESIUM SERPL-MCNC: 1.5 MG/DL (ref 1.6–2.4)
MCH RBC QN AUTO: 31.2 PG (ref 26.6–33)
MCHC RBC AUTO-ENTMCNC: 32.7 G/DL (ref 31.5–35.7)
MCV RBC AUTO: 95.3 FL (ref 79–97)
MONOCYTES # BLD AUTO: 0.79 10*3/MM3 (ref 0.1–0.9)
MONOCYTES NFR BLD AUTO: 9 % (ref 5–12)
NEUTROPHILS NFR BLD AUTO: 6.34 10*3/MM3 (ref 1.7–7)
NEUTROPHILS NFR BLD AUTO: 71.8 % (ref 42.7–76)
NRBC BLD AUTO-RTO: 0 /100 WBC (ref 0–0.2)
PLATELET # BLD AUTO: 295 10*3/MM3 (ref 140–450)
PMV BLD AUTO: 10.4 FL (ref 6–12)
POTASSIUM SERPL-SCNC: 4.3 MMOL/L (ref 3.5–5.2)
RBC # BLD AUTO: 4.04 10*6/MM3 (ref 3.77–5.28)
SODIUM SERPL-SCNC: 140 MMOL/L (ref 136–145)
WBC NRBC COR # BLD: 8.82 10*3/MM3 (ref 3.4–10.8)

## 2023-05-21 PROCEDURE — 83735 ASSAY OF MAGNESIUM: CPT | Performed by: FAMILY MEDICINE

## 2023-05-21 PROCEDURE — 85025 COMPLETE CBC W/AUTO DIFF WBC: CPT | Performed by: INTERNAL MEDICINE

## 2023-05-21 PROCEDURE — 25010000002 METHYLPREDNISOLONE PER 125 MG: Performed by: INTERNAL MEDICINE

## 2023-05-21 PROCEDURE — 82948 REAGENT STRIP/BLOOD GLUCOSE: CPT

## 2023-05-21 PROCEDURE — 80048 BASIC METABOLIC PNL TOTAL CA: CPT | Performed by: INTERNAL MEDICINE

## 2023-05-21 PROCEDURE — 63710000001 INSULIN ASPART PER 5 UNITS: Performed by: FAMILY MEDICINE

## 2023-05-21 PROCEDURE — 99231 SBSQ HOSP IP/OBS SF/LOW 25: CPT | Performed by: INTERNAL MEDICINE

## 2023-05-21 PROCEDURE — 25010000002 HYDRALAZINE PER 20 MG: Performed by: INTERNAL MEDICINE

## 2023-05-21 PROCEDURE — 71045 X-RAY EXAM CHEST 1 VIEW: CPT

## 2023-05-21 PROCEDURE — 63710000001 INSULIN DETEMIR PER 5 UNITS: Performed by: FAMILY MEDICINE

## 2023-05-21 RX ORDER — METHYLPREDNISOLONE SODIUM SUCCINATE 125 MG/2ML
125 INJECTION, POWDER, LYOPHILIZED, FOR SOLUTION INTRAMUSCULAR; INTRAVENOUS ONCE
Status: COMPLETED | OUTPATIENT
Start: 2023-05-21 | End: 2023-05-21

## 2023-05-21 RX ORDER — HYDROCODONE BITARTRATE AND ACETAMINOPHEN 7.5; 325 MG/1; MG/1
1 TABLET ORAL EVERY 6 HOURS PRN
Status: DISCONTINUED | OUTPATIENT
Start: 2023-05-21 | End: 2023-05-23 | Stop reason: HOSPADM

## 2023-05-21 RX ADMIN — LISINOPRIL 2.5 MG: 2.5 TABLET ORAL at 08:00

## 2023-05-21 RX ADMIN — DOCUSATE SODIUM 50 MG AND SENNOSIDES 8.6 MG 2 TABLET: 8.6; 5 TABLET, FILM COATED ORAL at 08:05

## 2023-05-21 RX ADMIN — HYDRALAZINE HYDROCHLORIDE 20 MG: 20 INJECTION INTRAMUSCULAR; INTRAVENOUS at 05:31

## 2023-05-21 RX ADMIN — INSULIN ASPART 2 UNITS: 100 INJECTION, SOLUTION INTRAVENOUS; SUBCUTANEOUS at 10:32

## 2023-05-21 RX ADMIN — INSULIN DETEMIR 7 UNITS: 100 INJECTION, SOLUTION SUBCUTANEOUS at 21:36

## 2023-05-21 RX ADMIN — INSULIN ASPART 3 UNITS: 100 INJECTION, SOLUTION INTRAVENOUS; SUBCUTANEOUS at 17:22

## 2023-05-21 RX ADMIN — LISINOPRIL 2.5 MG: 2.5 TABLET ORAL at 21:31

## 2023-05-21 RX ADMIN — CARVEDILOL 25 MG: 25 TABLET, FILM COATED ORAL at 17:22

## 2023-05-21 RX ADMIN — Medication 10 ML: at 23:15

## 2023-05-21 RX ADMIN — CARVEDILOL 25 MG: 25 TABLET, FILM COATED ORAL at 07:51

## 2023-05-21 RX ADMIN — HYDROCODONE BITARTRATE AND ACETAMINOPHEN 1 TABLET: 7.5; 325 TABLET ORAL at 07:51

## 2023-05-21 RX ADMIN — PANTOPRAZOLE SODIUM 40 MG: 40 TABLET, DELAYED RELEASE ORAL at 05:32

## 2023-05-21 RX ADMIN — METHYLPREDNISOLONE SODIUM SUCCINATE 125 MG: 125 INJECTION INTRAMUSCULAR; INTRAVENOUS at 10:16

## 2023-05-21 RX ADMIN — HYDROCODONE BITARTRATE AND ACETAMINOPHEN 1 TABLET: 7.5; 325 TABLET ORAL at 17:22

## 2023-05-21 NOTE — PLAN OF CARE
Goal Outcome Evaluation:         Pt voided twice this shift. VSS. Denies any shortness of breath. CXR for tomorrow ordered along with one time dose solumedrol. Pain controlled with prn hydrocodone. Orders to transfer to the floor. Will monitor.

## 2023-05-21 NOTE — PROGRESS NOTES
CTVS DAILY NOTE     LOS: 1 day   POD# 1  Procedure: RIGHT tube thoracostomy    Patient Care Team:  Provider, No Known as PCP - General    Chief Complaint: no complaints    Subjective:    Afebrile  Vitals stable  Breathing much better  Tolerating diet  CXR - much improved, RIGHT pleural effusion resolved  CT - no air leak, greater than 200 cc serous fluid output in 24 hours    Vital Signs  Temp:  [96.9 °F (36.1 °C)-98.3 °F (36.8 °C)] 98.1 °F (36.7 °C)  Heart Rate:  [55-80] 71  Resp:  [16-20] 16  BP: (133-219)/(57-98) 152/68  Body mass index is 19.15 kg/m².    Intake/Output Summary (Last 24 hours) at 5/21/2023 0920  Last data filed at 5/21/2023 0600  Gross per 24 hour   Intake 150 ml   Output 1725 ml   Net -1575 ml     No intake/output data recorded.    Wt Readings from Last 3 Encounters:   05/21/23 49 kg (108 lb 1.6 oz)       Objective  Lungs - clear to auscultation  Heart - regular, no murmurs    Results Review:      WBC No results found for: WBCS   HGB Hemoglobin   Date/Time Value Ref Range Status   05/21/2023 0545 12.6 12.0 - 15.9 g/dL Final   05/20/2023 0424 11.5 (L) 12.0 - 15.9 g/dL Final      HCT Hematocrit   Date/Time Value Ref Range Status   05/21/2023 0545 38.5 34.0 - 46.6 % Final   05/20/2023 0424 34.1 34.0 - 46.6 % Final      Platlets No results found for: LABPLAT     PT/INR:  No results found for: PROTIME/No results found for: INR    Sodium Sodium   Date/Time Value Ref Range Status   05/21/2023 0545 140 136 - 145 mmol/L Final   05/20/2023 0424 139 136 - 145 mmol/L Final      Potassium Potassium   Date/Time Value Ref Range Status   05/21/2023 0545 4.3 3.5 - 5.2 mmol/L Final   05/20/2023 0424 4.4 3.5 - 5.2 mmol/L Final      Chloride Chloride   Date/Time Value Ref Range Status   05/21/2023 0545 103 98 - 107 mmol/L Final   05/20/2023 0424 106 98 - 107 mmol/L Final      Bicarbonate No results found for: PLASMABICARB   BUN BUN   Date/Time Value Ref Range Status   05/21/2023 0545 38 (H) 8 - 23 mg/dL Final    05/20/2023 0424 54 (H) 8 - 23 mg/dL Final      Creatinine Creatinine   Date/Time Value Ref Range Status   05/21/2023 0545 2.56 (H) 0.57 - 1.00 mg/dL Final   05/20/2023 0424 2.87 (H) 0.57 - 1.00 mg/dL Final      Calcium Calcium   Date/Time Value Ref Range Status   05/21/2023 0545 8.6 8.6 - 10.5 mg/dL Final   05/20/2023 0424 9.2 8.6 - 10.5 mg/dL Final      Magnesium Magnesium   Date/Time Value Ref Range Status   05/21/2023 0545 1.5 (L) 1.6 - 2.4 mg/dL Final   05/20/2023 0424 1.8 1.6 - 2.4 mg/dL Final        Imaging Results (Last 72 Hours)     Procedure Component Value Units Date/Time    XR Chest 1 View [[id number]] Collected: 05/21/23 0600     Updated: 05/21/23 0604    Narrative:      XR CHEST 1 VIEW    HISTORY: pleural effusion    COMPARISON: 5/20/2023.    FINDINGS:  Frontal view of the chest was obtained.    There is diffuse interstitial airspace disease, right greater than left..Lung  aeration on the right is improved.  The heart is borderline enlarged.. Right  pleural effusion is dramatically improved, status post chest tube placement.    The osseous structures and surrounding soft tissues demonstrate no acute  abnormality.    Upper abdomen is unremarkable.        Impression:      1. Right pleural effusion is slightly improved status post chest tube placement.  2. There is diffuse airspace disease on the right after lung reexpansion.        CT Chest Without Contrast Diagnostic [[id number]] Collected: 05/20/23 0923     Updated: 05/20/23 0928    Narrative:      TECHNIQUE:  Axial images from the thoracic inlet through the levels of the diaphragms were  performed followed by 2-D multiplanar reformats.  No contrast.    FINDINGS:  No pathologically enlarged mediastinal, hilar, or axillary lymph nodes.  There  is a large right pleural effusion with right basilar consolidation.      Impression:      Large right pleural effusion with right basilar consolidation.  The left lung is  clear.      XR Chest 1 View [[id number]]  Collected: 05/20/23 0802     Updated: 05/20/23 0814    Narrative:      COMPARISON:  No comparison.    HISTORY:  Pleural effusion.      Impression:      FINDINGS/IMPRESSION:  1.   Cardiomegaly.  Moderate-sized right pleural effusion with underlying right  perihilar and right basilar airspace disease which could represent pneumonia,  atelectasis, or a combination thereof.  An underlying obstructing lesion or mass  could also be present.    2.  Recommend short-term follow-up to document complete resolution.  Alternatively, this could be further evaluated with dedicated CT scan of the  chest with contrast, as indicated.              carvedilol, 25 mg, Oral, BID With Meals  Insulin Aspart, 0-7 Units, Subcutaneous, TID AC  insulin detemir, 7 Units, Subcutaneous, Nightly  lisinopril, 2.5 mg, Oral, BID  methylPREDNISolone sodium succinate, 125 mg, Intravenous, Once  pantoprazole, 40 mg, Oral, Q AM  senna-docusate sodium, 2 tablet, Oral, BID  sodium chloride, 10 mL, Intravenous, Q12H             Patient Active Problem List   Diagnosis Code   • Pleural effusion J90       Assessment & Plan    CT to suction  CXR in a.m.  Solumedrol  Transfer to floor    Juan Miguel Adan MD  05/21/23  09:20 CDT

## 2023-05-21 NOTE — PLAN OF CARE
Goal Outcome Evaluation:  Plan of Care Reviewed With: patient        Progress: improving  Outcome Evaluation: Pt resting comfortably, Hydralazine PRN controlling HTN x2 doses.  Pt pain addressed with 1xdose of Morphine. Pt chest tube output decreased to 140cc total overnight.  All needs met at this time.

## 2023-05-21 NOTE — PROGRESS NOTES
[facility] Medicine Services  INPATIENT PROGRESS NOTE    Length of Stay: 1  Date of Admission: 5/19/2023  Primary Care Physician: Provider, No Known    Subjective   (S) Admitted for dyspnea and large pleural effusion, in the setting of PD for her ESRD  Yesterday a chest tube was placed for draining and by CT surgeon, no plans to take it out today    Review of Systems   All other systems reviewed and are negative.       All pertinent negatives and positives are as above. All other systems have been reviewed and are negative unless otherwise stated.     Prior to Admission medications    Medication Sig Start Date End Date Taking? Authorizing Provider   aspirin 81 MG chewable tablet Chew 1 tablet Daily.   Yes David David MD   Ascorbic Acid (Vitamin C) 500 MG capsule Take 1 capsule by mouth.    David David MD   carvedilol (COREG) 25 MG tablet Take 1 tablet by mouth 2 (Two) Times a Day With Meals.    David David MD   clopidogrel (PLAVIX) 75 MG tablet Take 1 tablet by mouth Daily.    David David MD   diphenhydrAMINE (BENADRYL) 25 mg capsule Take 1 capsule by mouth Daily.    David David MD   insulin glargine (LANTUS, SEMGLEE) 100 UNIT/ML injection Inject 7 Units under the skin into the appropriate area as directed Daily.    David David MD   Insulin Lispro (humaLOG) 100 UNIT/ML injection Inject 6 Units under the skin into the appropriate area as directed Daily.    David David MD   lisinopril (PRINIVIL,ZESTRIL) 2.5 MG tablet Take 1 tablet by mouth 2 (Two) Times a Day.    David David MD   omeprazole (priLOSEC) 40 MG capsule Take 1 capsule by mouth Daily.    David David MD   vitamin D3 125 MCG (5000 UT) capsule capsule Take 1,000 Units by mouth Daily.    David David MD       carvedilol, 25 mg, Oral, BID With Meals  Insulin Aspart, 0-7 Units, Subcutaneous, TID AC  insulin detemir, 7 Units,  Subcutaneous, Nightly  lisinopril, 2.5 mg, Oral, BID  methylPREDNISolone sodium succinate, 125 mg, Intravenous, Once  pantoprazole, 40 mg, Oral, Q AM  senna-docusate sodium, 2 tablet, Oral, BID  sodium chloride, 10 mL, Intravenous, Q12H           Objective    Temp:  [96.9 °F (36.1 °C)-98.3 °F (36.8 °C)] 98.1 °F (36.7 °C)  Heart Rate:  [55-80] 71  Resp:  [16-20] 16  BP: (133-219)/(57-98) 152/68    Physical Exam  HENT:      Head: Normocephalic.      Mouth/Throat:      Mouth: Mucous membranes are moist.   Eyes:      Extraocular Movements: Extraocular movements intact.   Cardiovascular:      Rate and Rhythm: Regular rhythm.      Heart sounds: Normal heart sounds.   Pulmonary:      Comments: Chest tube on the right side; crackles on the right base; no expiratory wheezing  Abdominal:      Palpations: Abdomen is soft.   Musculoskeletal:         General: Normal range of motion.      Cervical back: Normal range of motion and neck supple.   Skin:     General: Skin is warm.   Neurological:      General: No focal deficit present.      Mental Status: She is alert. Mental status is at baseline.   Psychiatric:         Behavior: Behavior normal.           Results Review:  I have reviewed the labs, radiology results, and diagnostic studies.    Laboratory Data:   Results from last 7 days   Lab Units 05/21/23  0545 05/20/23  0424   SODIUM mmol/L 140 139   POTASSIUM mmol/L 4.3 4.4   CHLORIDE mmol/L 103 106   CO2 mmol/L 21.0* 17.0*   BUN mg/dL 38* 54*   CREATININE mg/dL 2.56* 2.87*   GLUCOSE mg/dL 161* 156*   CALCIUM mg/dL 8.6 9.2   ANION GAP mmol/L 16.0* 16.0*     Estimated Creatinine Clearance: 15.1 mL/min (A) (by C-G formula based on SCr of 2.56 mg/dL (H)).  Results from last 7 days   Lab Units 05/21/23  0545 05/20/23  0424   MAGNESIUM mg/dL 1.5* 1.8         Results from last 7 days   Lab Units 05/21/23  0545 05/20/23  0424   WBC 10*3/mm3 8.82 6.40   HEMOGLOBIN g/dL 12.6 11.5*   HEMATOCRIT % 38.5 34.1   PLATELETS 10*3/mm3 295 292            Culture Data:   No results found for: BLOODCX  No results found for: URINECX  No results found for: RESPCX  No results found for: WOUNDCX  No results found for: STOOLCX  No components found for: BODYFLD    Radiology Data:   Imaging Results (Last 24 Hours)     Procedure Component Value Units Date/Time    XR Chest 1 View [[id number]] Collected: 05/21/23 0600     Updated: 05/21/23 0604    Narrative:      XR CHEST 1 VIEW    HISTORY: pleural effusion    COMPARISON: 5/20/2023.    FINDINGS:  Frontal view of the chest was obtained.    There is diffuse interstitial airspace disease, right greater than left..Lung  aeration on the right is improved.  The heart is borderline enlarged.. Right  pleural effusion is dramatically improved, status post chest tube placement.    The osseous structures and surrounding soft tissues demonstrate no acute  abnormality.    Upper abdomen is unremarkable.        Impression:      1. Right pleural effusion is slightly improved status post chest tube placement.  2. There is diffuse airspace disease on the right after lung reexpansion.        CT Chest Without Contrast Diagnostic [[id number]] Collected: 05/20/23 0923     Updated: 05/20/23 0928    Narrative:      TECHNIQUE:  Axial images from the thoracic inlet through the levels of the diaphragms were  performed followed by 2-D multiplanar reformats.  No contrast.    FINDINGS:  No pathologically enlarged mediastinal, hilar, or axillary lymph nodes.  There  is a large right pleural effusion with right basilar consolidation.      Impression:      Large right pleural effusion with right basilar consolidation.  The left lung is  clear.            I have reviewed the patient's current medications.     Assessment/Plan   Large right pleural effusion     S/p right chest tube placement and draining upon chest tube placement, 1500 ml of serous fluid; she is breathing at room air now     Appreciated CT surgery team    ESRD     Per  nephrology    Medical Decision Making  Number and Complexity of problems: one major problem  Differential Diagnosis: malignancy    Conditions and Status:        Condition is improving.     MDM Data  External documents reviewed: previous medical records  My EKG interpretation: n/a  My plain film interpretation: large right pleural effusion    Discussed with: RN and patient     Treatment Plan  See above  Patient can be send to the floor if others agreed     I confirmed that the patient's Advance Care Plan is present, code status is documented, or surrogate decision maker is listed in the patient's medical record.     Wesley Brasher MD

## 2023-05-21 NOTE — PROGRESS NOTES
"[facility]  [address] [place] [address]. [address]  T - [phone]  F - [phone]     Progress Note          PATIENT  DEMOGRAPHICS   PATIENT NAME: Harmony Montoya                      PHYSICIAN: Brunilda Knott MD  : 1950  MRN: [id number]   LOS: 1 day    Patient Care Team:  Provider, No Known as PCP - General  Subjective   SUBJECTIVE     S/p chest tube        Objective   OBJECTIVE   Vital Signs  Temp:  [96.9 °F (36.1 °C)-98.3 °F (36.8 °C)] 98.1 °F (36.7 °C)  Heart Rate:  [55-80] 61  Resp:  [16-20] 16  BP: (130-219)/(57-98) 153/75    Flowsheet Rows    Flowsheet Row First Filed Value   Admission Height 160 cm (63\") Documented at 2023   Admission Weight 60.7 kg (133 lb 12.8 oz) Documented at 2023           I/O last 3 completed shifts:  In: 150 [P.O.:150]  Out: 1825 [Urine:175; Chest Tube:1650]    PHYSICAL EXAM    Physical Exam  Vitals reviewed.   HENT:      Head: Normocephalic.      Nose: Nose normal.      Mouth/Throat:      Mouth: Mucous membranes are moist.   Eyes:      Extraocular Movements: Extraocular movements intact.      Conjunctiva/sclera: Conjunctivae normal.      Pupils: Pupils are equal, round, and reactive to light.   Cardiovascular:      Rate and Rhythm: Normal rate and regular rhythm.   Pulmonary:      Effort: Pulmonary effort is normal.   Abdominal:      General: Abdomen is flat.   Musculoskeletal:         General: Normal range of motion.      Cervical back: Normal range of motion.   Skin:     General: Skin is warm.   Neurological:      General: No focal deficit present.      Mental Status: She is alert.   Psychiatric:         Mood and Affect: Mood normal.         RESULTS   Results Review:    Results from last 7 days   Lab Units 23  0545 23  0424   SODIUM mmol/L 140 139   POTASSIUM mmol/L 4.3 4.4   CHLORIDE mmol/L 103 106   CO2 mmol/L 21.0* 17.0*   BUN mg/dL 38* 54*   CREATININE mg/dL 2.56* 2.87*   CALCIUM mg/dL 8.6 9.2   GLUCOSE " mg/dL 161* 156*       Estimated Creatinine Clearance: 15.1 mL/min (A) (by C-G formula based on SCr of 2.56 mg/dL (H)).    Results from last 7 days   Lab Units 05/21/23  0545 05/20/23  0424   MAGNESIUM mg/dL 1.5* 1.8             Results from last 7 days   Lab Units 05/21/23  0545 05/20/23  0424   WBC 10*3/mm3 8.82 6.40   HEMOGLOBIN g/dL 12.6 11.5*   PLATELETS 10*3/mm3 295 292               Imaging Results (Last 24 Hours)     Procedure Component Value Units Date/Time    XR Chest 1 View [[id number]] Collected: 05/21/23 0600     Updated: 05/21/23 0604    Narrative:      XR CHEST 1 VIEW    HISTORY: pleural effusion    COMPARISON: 5/20/2023.    FINDINGS:  Frontal view of the chest was obtained.    There is diffuse interstitial airspace disease, right greater than left..Lung  aeration on the right is improved.  The heart is borderline enlarged.. Right  pleural effusion is dramatically improved, status post chest tube placement.    The osseous structures and surrounding soft tissues demonstrate no acute  abnormality.    Upper abdomen is unremarkable.        Impression:      1. Right pleural effusion is slightly improved status post chest tube placement.  2. There is diffuse airspace disease on the right after lung reexpansion.               MEDICATIONS    carvedilol, 25 mg, Oral, BID With Meals  Insulin Aspart, 0-7 Units, Subcutaneous, TID AC  insulin detemir, 7 Units, Subcutaneous, Nightly  lisinopril, 2.5 mg, Oral, BID  pantoprazole, 40 mg, Oral, Q AM  senna-docusate sodium, 2 tablet, Oral, BID  sodium chloride, 10 mL, Intravenous, Q12H           Assessment & Plan   ASSESSMENT / PLAN      Pleural effusion    1. End-stage renal disease on PD   - currently training for PD, last pd on thursday  - Has left UE AV fistula for HD  -We will continue dialysis while inpatient,  HD today 2 hours 5/20 then monday  -s/p chest tube 1500 cc fluid drained- pleural fluid glucose was 163 which means pleural fluid is not as a result of pd  fluid leak.   -Renal diet     2. Hypertension  -Will Monitor     3. Anemia  -Monitor hemoglobin transfuse if hemoglobin less than 7     4. Volume overload  -We will manage volume with dialysis     5. Hyperkalemia  -We will manage electrolytes including potassium with dialysis     6. Severe right sided pleural effusion  s/p chest tube 1500 cc fluid drained  pleural fluid glucose was 163 - pleural effusion is not due to pd fluid     Thank you for the consult we will continue to follow the patient.  Let me know if you have any questions             This document has been electronically signed by Brunilda Knott MD on May 21, 2023 13:36 CDT

## 2023-05-22 ENCOUNTER — APPOINTMENT (OUTPATIENT)
Dept: GENERAL RADIOLOGY | Facility: HOSPITAL | Age: 73
End: 2023-05-22
Payer: MEDICARE

## 2023-05-22 LAB
ANION GAP SERPL CALCULATED.3IONS-SCNC: 10 MMOL/L (ref 5–15)
BASOPHILS # BLD AUTO: 0.02 10*3/MM3 (ref 0–0.2)
BASOPHILS NFR BLD AUTO: 0.2 % (ref 0–1.5)
BUN SERPL-MCNC: 53 MG/DL (ref 8–23)
BUN/CREAT SERPL: 16.4 (ref 7–25)
CALCIUM SPEC-SCNC: 8.7 MG/DL (ref 8.6–10.5)
CHLORIDE SERPL-SCNC: 98 MMOL/L (ref 98–107)
CO2 SERPL-SCNC: 24 MMOL/L (ref 22–29)
CREAT SERPL-MCNC: 3.24 MG/DL (ref 0.57–1)
DEPRECATED RDW RBC AUTO: 54.3 FL (ref 37–54)
EGFRCR SERPLBLD CKD-EPI 2021: 14.5 ML/MIN/1.73
EOSINOPHIL # BLD AUTO: 0.09 10*3/MM3 (ref 0–0.4)
EOSINOPHIL NFR BLD AUTO: 1 % (ref 0.3–6.2)
ERYTHROCYTE [DISTWIDTH] IN BLOOD BY AUTOMATED COUNT: 16.1 % (ref 12.3–15.4)
GLUCOSE BLDC GLUCOMTR-MCNC: 140 MG/DL (ref 70–130)
GLUCOSE BLDC GLUCOMTR-MCNC: 217 MG/DL (ref 70–130)
GLUCOSE BLDC GLUCOMTR-MCNC: 218 MG/DL (ref 70–130)
GLUCOSE BLDC GLUCOMTR-MCNC: 308 MG/DL (ref 70–130)
GLUCOSE BLDC GLUCOMTR-MCNC: 321 MG/DL (ref 70–130)
GLUCOSE SERPL-MCNC: 337 MG/DL (ref 65–99)
HCT VFR BLD AUTO: 35 % (ref 34–46.6)
HGB BLD-MCNC: 12 G/DL (ref 12–15.9)
IMM GRANULOCYTES # BLD AUTO: 0.05 10*3/MM3 (ref 0–0.05)
IMM GRANULOCYTES NFR BLD AUTO: 0.5 % (ref 0–0.5)
LYMPHOCYTES # BLD AUTO: 1.39 10*3/MM3 (ref 0.7–3.1)
LYMPHOCYTES NFR BLD AUTO: 14.9 % (ref 19.6–45.3)
MCH RBC QN AUTO: 31.8 PG (ref 26.6–33)
MCHC RBC AUTO-ENTMCNC: 34.3 G/DL (ref 31.5–35.7)
MCV RBC AUTO: 92.8 FL (ref 79–97)
MONOCYTES # BLD AUTO: 0.97 10*3/MM3 (ref 0.1–0.9)
MONOCYTES NFR BLD AUTO: 10.4 % (ref 5–12)
NEUTROPHILS NFR BLD AUTO: 6.79 10*3/MM3 (ref 1.7–7)
NEUTROPHILS NFR BLD AUTO: 73 % (ref 42.7–76)
NRBC BLD AUTO-RTO: 0 /100 WBC (ref 0–0.2)
PLATELET # BLD AUTO: 253 10*3/MM3 (ref 140–450)
PMV BLD AUTO: 10.4 FL (ref 6–12)
POTASSIUM SERPL-SCNC: 4.4 MMOL/L (ref 3.5–5.2)
RBC # BLD AUTO: 3.77 10*6/MM3 (ref 3.77–5.28)
SODIUM SERPL-SCNC: 132 MMOL/L (ref 136–145)
WBC NRBC COR # BLD: 9.31 10*3/MM3 (ref 3.4–10.8)

## 2023-05-22 PROCEDURE — 71045 X-RAY EXAM CHEST 1 VIEW: CPT

## 2023-05-22 PROCEDURE — 63710000001 INSULIN DETEMIR PER 5 UNITS: Performed by: FAMILY MEDICINE

## 2023-05-22 PROCEDURE — 85025 COMPLETE CBC W/AUTO DIFF WBC: CPT | Performed by: INTERNAL MEDICINE

## 2023-05-22 PROCEDURE — 82948 REAGENT STRIP/BLOOD GLUCOSE: CPT

## 2023-05-22 PROCEDURE — 63710000001 INSULIN ASPART PER 5 UNITS: Performed by: FAMILY MEDICINE

## 2023-05-22 PROCEDURE — 80048 BASIC METABOLIC PNL TOTAL CA: CPT | Performed by: INTERNAL MEDICINE

## 2023-05-22 RX ORDER — LIDOCAINE AND PRILOCAINE 25; 25 MG/G; MG/G
1 CREAM TOPICAL ONCE
Status: COMPLETED | OUTPATIENT
Start: 2023-05-22 | End: 2023-05-22

## 2023-05-22 RX ORDER — ALBUMIN (HUMAN) 12.5 G/50ML
12.5 SOLUTION INTRAVENOUS AS NEEDED
Status: DISCONTINUED | OUTPATIENT
Start: 2023-05-22 | End: 2023-05-23 | Stop reason: HOSPADM

## 2023-05-22 RX ORDER — AMLODIPINE BESYLATE 5 MG/1
5 TABLET ORAL ONCE
Status: DISCONTINUED | OUTPATIENT
Start: 2023-05-22 | End: 2023-05-23 | Stop reason: HOSPADM

## 2023-05-22 RX ADMIN — HYDROCODONE BITARTRATE AND ACETAMINOPHEN 1 TABLET: 7.5; 325 TABLET ORAL at 19:53

## 2023-05-22 RX ADMIN — INSULIN ASPART 5 UNITS: 100 INJECTION, SOLUTION INTRAVENOUS; SUBCUTANEOUS at 07:23

## 2023-05-22 RX ADMIN — PANTOPRAZOLE SODIUM 40 MG: 40 TABLET, DELAYED RELEASE ORAL at 06:48

## 2023-05-22 RX ADMIN — Medication 10 ML: at 08:05

## 2023-05-22 RX ADMIN — HYDROCODONE BITARTRATE AND ACETAMINOPHEN 1 TABLET: 7.5; 325 TABLET ORAL at 11:28

## 2023-05-22 RX ADMIN — INSULIN ASPART 3 UNITS: 100 INJECTION, SOLUTION INTRAVENOUS; SUBCUTANEOUS at 14:11

## 2023-05-22 RX ADMIN — INSULIN DETEMIR 7 UNITS: 100 INJECTION, SOLUTION SUBCUTANEOUS at 20:38

## 2023-05-22 RX ADMIN — LISINOPRIL 2.5 MG: 2.5 TABLET ORAL at 20:03

## 2023-05-22 RX ADMIN — Medication 10 ML: at 20:03

## 2023-05-22 RX ADMIN — LIDOCAINE AND PRILOCAINE 1 APPLICATION: 25; 25 CREAM TOPICAL at 07:20

## 2023-05-22 RX ADMIN — HYDROCODONE BITARTRATE AND ACETAMINOPHEN 1 TABLET: 7.5; 325 TABLET ORAL at 00:06

## 2023-05-22 NOTE — PLAN OF CARE
Problem: Device-Related Complication Risk (Hemodialysis)  Goal: Safe, Effective Therapy Delivery  Outcome: Ongoing, Progressing     Problem: Hemodynamic Instability (Hemodialysis)  Goal: Effective Tissue Perfusion  Outcome: Ongoing, Progressing     Problem: Infection (Hemodialysis)  Goal: Absence of Infection Signs and Symptoms  Outcome: Ongoing, Progressing   Goal Outcome Evaluation:         Hemodynamically stable. Obtained ordered UF.

## 2023-05-22 NOTE — PROGRESS NOTES
"[facility]  [address] [place] [address]. [address]  T - [phone]  F - [phone]     Progress Note          PATIENT  DEMOGRAPHICS   PATIENT NAME: Harmony Montoya                      PHYSICIAN: Taz Adams MD  : 1950  MRN: [id number]   LOS: 2 days    Patient Care Team:  Provider, No Known as PCP - General  Subjective   SUBJECTIVE   No concerns this morning being prepped with Lidocaine cream for HD through left fistula. Wants to go home so she does not miss her peritoneal dialysis classes.          Objective   OBJECTIVE   Vital Signs  Temp:  [97.5 °F (36.4 °C)-98.3 °F (36.8 °C)] 97.7 °F (36.5 °C)  Heart Rate:  [60-73] 60  BP: (130-170)/(61-75) 162/75    Flowsheet Rows    Flowsheet Row First Filed Value   Admission Height 160 cm (63\") Documented at 2023   Admission Weight 60.7 kg (133 lb 12.8 oz) Documented at 2023           I/O last 3 completed shifts:  In: 630 [P.O.:630]  Out: 2105 [Urine:275; Chest Tube:1830]    PHYSICAL EXAM    Physical Exam  Vitals and nursing note reviewed.   Constitutional:       General: She is not in acute distress.     Appearance: Normal appearance. She is not ill-appearing or diaphoretic.   Eyes:      Conjunctiva/sclera: Conjunctivae normal.   Cardiovascular:      Rate and Rhythm: Normal rate and regular rhythm.      Pulses: Normal pulses.      Heart sounds: Normal heart sounds. No murmur heard.    No friction rub. No gallop.   Pulmonary:      Effort: Pulmonary effort is normal.      Breath sounds: No wheezing, rhonchi or rales.      Comments: Chest tube in   Musculoskeletal:      Right lower leg: No edema.      Left lower leg: No edema.   Skin:     General: Skin is warm and dry.      Comments: Left fistula patent with bruit   Neurological:      Mental Status: She is alert.   Psychiatric:         Mood and Affect: Mood normal.         Behavior: Behavior normal.         RESULTS   Results Review:    Results from last 7 " days   Lab Units 05/22/23 0531 05/21/23  0545 05/20/23  0424   SODIUM mmol/L 132* 140 139   POTASSIUM mmol/L 4.4 4.3 4.4   CHLORIDE mmol/L 98 103 106   CO2 mmol/L 24.0 21.0* 17.0*   BUN mg/dL 53* 38* 54*   CREATININE mg/dL 3.24* 2.56* 2.87*   CALCIUM mg/dL 8.7 8.6 9.2   GLUCOSE mg/dL 337* 161* 156*       Estimated Creatinine Clearance: 12.8 mL/min (A) (by C-G formula based on SCr of 3.24 mg/dL (H)).    Results from last 7 days   Lab Units 05/21/23  0545 05/20/23  0424   MAGNESIUM mg/dL 1.5* 1.8             Results from last 7 days   Lab Units 05/22/23  0531 05/21/23  0545 05/20/23  0424   WBC 10*3/mm3 9.31 8.82 6.40   HEMOGLOBIN g/dL 12.0 12.6 11.5*   PLATELETS 10*3/mm3 253 295 292               Imaging Results (Last 24 Hours)     Procedure Component Value Units Date/Time    XR Chest 1 View [[id number]] Resulted: 05/22/23 0459     Updated: 05/22/23 0500           MEDICATIONS    carvedilol, 25 mg, Oral, BID With Meals  Insulin Aspart, 0-7 Units, Subcutaneous, TID AC  insulin detemir, 7 Units, Subcutaneous, Nightly  lisinopril, 2.5 mg, Oral, BID  pantoprazole, 40 mg, Oral, Q AM  senna-docusate sodium, 2 tablet, Oral, BID  sodium chloride, 10 mL, Intravenous, Q12H           Assessment & Plan   ASSESSMENT / PLAN      Pleural effusion    1. End-stage renal disease on PD   - currently training for PD, last pd on thursday  - Has left UE AV fistula for HD  -We will continue dialysis while inpatient,  HD today 2 hours 5/20 on admission and then today  -s/p chest tube 1500 cc fluid drained- pleural fluid glucose was 163 which means pleural fluid is not as a result of pd fluid leak.   -Renal diet  - ok to discharge from renal standpoint and then she can continue her home PD training     2. Hypertension-   On carvedilol and lisinopril  -Will Monitor. HD today.       3. Anemia-resolved  -Monitor hemoglobin transfuse if hemoglobin less than 7     4. Volume overload  -We will manage volume with dialysis     5.  Hyperkalemia-improved  -We will manage electrolytes including potassium with dialysis     6. Severe right sided pleural effusion  s/p chest tube 1500 cc fluid drained  pleural fluid glucose was 163 - pleural effusion is not due to pd fluid             This document has been electronically signed by Taz Adams MD on May 22, 2023 06:40 CDT      I have reviewed the case with the resident. I have also examined and seen  the patient.  I agree with the assessment and plan as documented in the resident's note.         This document has been electronically signed by Shayna Abreu MD on May 22, 2023 09:47 CDT

## 2023-05-22 NOTE — PLAN OF CARE
Goal Outcome Evaluation:         Patient resting in bed on room air. Hemodialysis today. Bradycardia.

## 2023-05-22 NOTE — PROGRESS NOTES
[facility]  INPATIENT PROGRESS NOTE    Length of Stay: 2  Date of Admission: 5/19/2023  Primary Care Physician: Provider, No Known    Subjective   (S) Admitted for dyspnea and large pleural effusion, in the setting of PD for her ESRD  Doing much better; O2 sat at room air is 100 %; examined on hemodialysis room; complains of pain in the chest tube insertion     Review of Systems   All other systems reviewed and are negative.       All pertinent negatives and positives are as above. All other systems have been reviewed and are negative unless otherwise stated.     Prior to Admission medications    Medication Sig Start Date End Date Taking? Authorizing Provider   aspirin 81 MG chewable tablet Chew 1 tablet Daily.   Yes David David MD   Ascorbic Acid (Vitamin C) 500 MG capsule Take 1 capsule by mouth.    David David MD   carvedilol (COREG) 25 MG tablet Take 1 tablet by mouth 2 (Two) Times a Day With Meals.    David David MD   clopidogrel (PLAVIX) 75 MG tablet Take 1 tablet by mouth Daily.    David David MD   diphenhydrAMINE (BENADRYL) 25 mg capsule Take 1 capsule by mouth Daily.    David David MD   insulin glargine (LANTUS, SEMGLEE) 100 UNIT/ML injection Inject 7 Units under the skin into the appropriate area as directed Daily.    David David MD   Insulin Lispro (humaLOG) 100 UNIT/ML injection Inject 6 Units under the skin into the appropriate area as directed Daily.    David David MD   lisinopril (PRINIVIL,ZESTRIL) 2.5 MG tablet Take 1 tablet by mouth 2 (Two) Times a Day.    David David MD   omeprazole (priLOSEC) 40 MG capsule Take 1 capsule by mouth Daily.    David David MD   vitamin D3 125 MCG (5000 UT) capsule capsule Take 1,000 Units by mouth Daily.    David David MD       carvedilol, 25 mg, Oral, BID With Meals  Insulin Aspart, 0-7 Units, Subcutaneous, TID  AC  insulin detemir, 7 Units, Subcutaneous, Nightly  lisinopril, 2.5 mg, Oral, BID  pantoprazole, 40 mg, Oral, Q AM  senna-docusate sodium, 2 tablet, Oral, BID  sodium chloride, 10 mL, Intravenous, Q12H           Objective    Temp:  [97.5 °F (36.4 °C)-98.1 °F (36.7 °C)] 97.7 °F (36.5 °C)  Heart Rate:  [60-72] 60  Resp:  [16-18] 16  BP: (130-170)/(61-76) 137/62    Physical Exam  Constitutional:       Comments: As today   HENT:      Head: Normocephalic.      Mouth/Throat:      Mouth: Mucous membranes are moist.   Eyes:      Extraocular Movements: Extraocular movements intact.   Cardiovascular:      Rate and Rhythm: Regular rhythm.      Heart sounds: Normal heart sounds.   Pulmonary:      Comments: Chest tube on the right side; crackles on the right base; no expiratory wheezing  Abdominal:      Palpations: Abdomen is soft.   Musculoskeletal:         General: Normal range of motion.      Cervical back: Normal range of motion and neck supple.   Skin:     General: Skin is warm.   Neurological:      General: No focal deficit present.      Mental Status: She is alert. Mental status is at baseline.   Psychiatric:         Behavior: Behavior normal.           Results Review:  I have reviewed the labs, radiology results, and diagnostic studies.    Laboratory Data:   Results from last 7 days   Lab Units 05/22/23  0531 05/21/23  0545 05/20/23  0424   SODIUM mmol/L 132* 140 139   POTASSIUM mmol/L 4.4 4.3 4.4   CHLORIDE mmol/L 98 103 106   CO2 mmol/L 24.0 21.0* 17.0*   BUN mg/dL 53* 38* 54*   CREATININE mg/dL 3.24* 2.56* 2.87*   GLUCOSE mg/dL 337* 161* 156*   CALCIUM mg/dL 8.7 8.6 9.2   ANION GAP mmol/L 10.0 16.0* 16.0*     Estimated Creatinine Clearance: 12.8 mL/min (A) (by C-G formula based on SCr of 3.24 mg/dL (H)).  Results from last 7 days   Lab Units 05/21/23  0545 05/20/23  0424   MAGNESIUM mg/dL 1.5* 1.8         Results from last 7 days   Lab Units 05/22/23  0531 05/21/23  0545 05/20/23  0424   WBC 10*3/mm3 9.31 8.82 6.40    HEMOGLOBIN g/dL 12.0 12.6 11.5*   HEMATOCRIT % 35.0 38.5 34.1   PLATELETS 10*3/mm3 253 295 292           Culture Data:   No results found for: BLOODCX  No results found for: URINECX  No results found for: RESPCX  No results found for: WOUNDCX  No results found for: STOOLCX  No components found for: BODYFLD    Radiology Data:   Imaging Results (Last 24 Hours)     Procedure Component Value Units Date/Time    XR Chest 1 View [[id number]] Collected: 05/22/23 0707     Updated: 05/22/23 0729    Narrative:      FINDINGS:  Right chest tube in place.  No definite pneumothorax.  Heart size upper limits  of normal.  No focal pulmonary consolidation.  Significantly improved aeration  of the lungs when compared with 5/21/2023.          I have reviewed the patient's current medications.     Assessment/Plan   Large right pleural effusion     S/p right chest tube placement on 5/20/23; minimal draining on the last 24 hours; keep breathing at room air now     Output from chest tube is less than 100 ml; likely will take chest tube out soon     Appreciated CT surgery team    ESRD     Per nephrology    Medical Decision Making  Number and Complexity of problems: one major problem  Differential Diagnosis: malignancy    Conditions and Status:        Condition is improving.     MDM Data  External documents reviewed: previous medical records  My EKG interpretation: n/a  My plain film interpretation: large right pleural effusion    Discussed with: RN and patient     Treatment Plan  See above  Patient can be send to the floor if others agreed     I confirmed that the patient's Advance Care Plan is present, code status is documented, or surrogate decision maker is listed in the patient's medical record.     Wesley Brasher MD

## 2023-05-22 NOTE — PLAN OF CARE
"Goal Outcome Evaluation:  Plan of Care Reviewed With: patient        Progress: no change  Outcome Evaluation: Pt had events of overnight confusion stating that she was going \"back to the kitchen\" and wanting to get out of bed.  Pt VSS and FSBS elevated over yesterday.  Pt responded much better to Hydrocodone for pain vs. the Morphine.  Pt stated that the \"Morphine was how we were trying to poisen her\".  Pt is up to BSC with assistance of 2.  Chest tube output 40cc less than prior night.  All needs met at this time.         "

## 2023-05-23 VITALS
HEART RATE: 57 BPM | HEIGHT: 63 IN | TEMPERATURE: 97.3 F | OXYGEN SATURATION: 93 % | DIASTOLIC BLOOD PRESSURE: 56 MMHG | SYSTOLIC BLOOD PRESSURE: 119 MMHG | BODY MASS INDEX: 21.05 KG/M2 | RESPIRATION RATE: 16 BRPM | WEIGHT: 118.8 LBS

## 2023-05-23 LAB
ANION GAP SERPL CALCULATED.3IONS-SCNC: 11 MMOL/L (ref 5–15)
BASOPHILS # BLD AUTO: 0.07 10*3/MM3 (ref 0–0.2)
BASOPHILS NFR BLD AUTO: 0.8 % (ref 0–1.5)
BUN SERPL-MCNC: 38 MG/DL (ref 8–23)
BUN/CREAT SERPL: 12.4 (ref 7–25)
CALCIUM SPEC-SCNC: 8.6 MG/DL (ref 8.6–10.5)
CHLORIDE SERPL-SCNC: 98 MMOL/L (ref 98–107)
CO2 SERPL-SCNC: 26 MMOL/L (ref 22–29)
CREAT SERPL-MCNC: 3.07 MG/DL (ref 0.57–1)
DEPRECATED RDW RBC AUTO: 53.9 FL (ref 37–54)
EGFRCR SERPLBLD CKD-EPI 2021: 15.5 ML/MIN/1.73
EOSINOPHIL # BLD AUTO: 0.29 10*3/MM3 (ref 0–0.4)
EOSINOPHIL NFR BLD AUTO: 3.4 % (ref 0.3–6.2)
ERYTHROCYTE [DISTWIDTH] IN BLOOD BY AUTOMATED COUNT: 15.9 % (ref 12.3–15.4)
GLUCOSE BLDC GLUCOMTR-MCNC: 190 MG/DL (ref 70–130)
GLUCOSE BLDC GLUCOMTR-MCNC: 247 MG/DL (ref 70–130)
GLUCOSE SERPL-MCNC: 261 MG/DL (ref 65–99)
HCT VFR BLD AUTO: 36 % (ref 34–46.6)
HGB BLD-MCNC: 12.2 G/DL (ref 12–15.9)
IMM GRANULOCYTES # BLD AUTO: 0.02 10*3/MM3 (ref 0–0.05)
IMM GRANULOCYTES NFR BLD AUTO: 0.2 % (ref 0–0.5)
LYMPHOCYTES # BLD AUTO: 2.19 10*3/MM3 (ref 0.7–3.1)
LYMPHOCYTES NFR BLD AUTO: 25.5 % (ref 19.6–45.3)
MCH RBC QN AUTO: 31.6 PG (ref 26.6–33)
MCHC RBC AUTO-ENTMCNC: 33.9 G/DL (ref 31.5–35.7)
MCV RBC AUTO: 93.3 FL (ref 79–97)
MONOCYTES # BLD AUTO: 0.82 10*3/MM3 (ref 0.1–0.9)
MONOCYTES NFR BLD AUTO: 9.6 % (ref 5–12)
NEUTROPHILS NFR BLD AUTO: 5.19 10*3/MM3 (ref 1.7–7)
NEUTROPHILS NFR BLD AUTO: 60.5 % (ref 42.7–76)
NRBC BLD AUTO-RTO: 0 /100 WBC (ref 0–0.2)
PLATELET # BLD AUTO: 204 10*3/MM3 (ref 140–450)
PMV BLD AUTO: 10.6 FL (ref 6–12)
POTASSIUM SERPL-SCNC: 4 MMOL/L (ref 3.5–5.2)
RBC # BLD AUTO: 3.86 10*6/MM3 (ref 3.77–5.28)
SODIUM SERPL-SCNC: 135 MMOL/L (ref 136–145)
WBC NRBC COR # BLD: 8.58 10*3/MM3 (ref 3.4–10.8)

## 2023-05-23 PROCEDURE — 85025 COMPLETE CBC W/AUTO DIFF WBC: CPT | Performed by: INTERNAL MEDICINE

## 2023-05-23 PROCEDURE — 99231 SBSQ HOSP IP/OBS SF/LOW 25: CPT | Performed by: NURSE PRACTITIONER

## 2023-05-23 PROCEDURE — 80048 BASIC METABOLIC PNL TOTAL CA: CPT | Performed by: INTERNAL MEDICINE

## 2023-05-23 PROCEDURE — 82948 REAGENT STRIP/BLOOD GLUCOSE: CPT

## 2023-05-23 PROCEDURE — 63710000001 INSULIN ASPART PER 5 UNITS: Performed by: FAMILY MEDICINE

## 2023-05-23 RX ORDER — HEPARIN SODIUM 1000 [USP'U]/ML
4000 INJECTION, SOLUTION INTRAVENOUS; SUBCUTANEOUS AS NEEDED
Status: DISCONTINUED | OUTPATIENT
Start: 2023-05-24 | End: 2023-05-23 | Stop reason: HOSPADM

## 2023-05-23 RX ORDER — ALBUMIN (HUMAN) 12.5 G/50ML
12.5 SOLUTION INTRAVENOUS AS NEEDED
Status: DISCONTINUED | OUTPATIENT
Start: 2023-05-24 | End: 2023-05-23 | Stop reason: HOSPADM

## 2023-05-23 RX ADMIN — CARVEDILOL 25 MG: 25 TABLET, FILM COATED ORAL at 08:13

## 2023-05-23 RX ADMIN — INSULIN ASPART 3 UNITS: 100 INJECTION, SOLUTION INTRAVENOUS; SUBCUTANEOUS at 11:33

## 2023-05-23 RX ADMIN — LISINOPRIL 2.5 MG: 2.5 TABLET ORAL at 08:13

## 2023-05-23 RX ADMIN — INSULIN ASPART 2 UNITS: 100 INJECTION, SOLUTION INTRAVENOUS; SUBCUTANEOUS at 06:32

## 2023-05-23 RX ADMIN — PANTOPRAZOLE SODIUM 40 MG: 40 TABLET, DELAYED RELEASE ORAL at 06:32

## 2023-05-23 RX ADMIN — DOCUSATE SODIUM 50 MG AND SENNOSIDES 8.6 MG 2 TABLET: 8.6; 5 TABLET, FILM COATED ORAL at 08:13

## 2023-05-23 NOTE — PROGRESS NOTES
"CTVS DAILY NOTE     LOS: 3 days   POD# 3  Procedure: RIGHT tube thoracostomy    Patient Care Team:  Uyen Colon APRN as PCP - General (Nurse Practitioner)    Chief Complaint: no complaints    Subjective:    Afebrile  Vitals stable  Breathing much better  Tolerating diet  CXR - much improved, RIGHT pleural effusion resolved  CT - no air leak, 150 cc serous fluid output in 24 hours    Vital Signs  Temp:  [97 °F (36.1 °C)-98 °F (36.7 °C)] 97.6 °F (36.4 °C)  Heart Rate:  [54-67] 66  Resp:  [16-18] 18  BP: ()/(56-94) 113/56  Body mass index is 21.04 kg/m².    Intake/Output Summary (Last 24 hours) at 5/23/2023 1041  Last data filed at 5/23/2023 0815  Gross per 24 hour   Intake --   Output 2815 ml   Net -2815 ml     I/O this shift:  In: -   Out: 150 [Urine:150]    Wt Readings from Last 3 Encounters:   05/23/23 53.9 kg (118 lb 12.8 oz)       Objective:  General Appearance:  Comfortable.    Vital signs: (most recent): Blood pressure 113/56, pulse 66, temperature 97.6 °F (36.4 °C), temperature source Temporal, resp. rate 18, height 160 cm (63\"), weight 53.9 kg (118 lb 12.8 oz), SpO2 94 %.  Vital signs are normal.    Lungs:  Normal effort and normal respiratory rate.  (CT no air leak)  Abdomen: Abdomen is soft.  Bowel sounds are normal.     Neurological: Patient is alert and oriented to person, place and time.    Skin:  Warm and dry.            Results Review:      WBC No results found for: WBCS   HGB Hemoglobin   Date/Time Value Ref Range Status   05/23/2023 0314 12.2 12.0 - 15.9 g/dL Final   05/22/2023 0531 12.0 12.0 - 15.9 g/dL Final   05/21/2023 0545 12.6 12.0 - 15.9 g/dL Final      HCT Hematocrit   Date/Time Value Ref Range Status   05/23/2023 0314 36.0 34.0 - 46.6 % Final   05/22/2023 0531 35.0 34.0 - 46.6 % Final   05/21/2023 0545 38.5 34.0 - 46.6 % Final      Platlets No results found for: LABPLAT     PT/INR:  No results found for: PROTIME/No results found for: INR    Sodium Sodium   Date/Time Value Ref Range " Status   05/23/2023 0314 135 (L) 136 - 145 mmol/L Final   05/22/2023 0531 132 (L) 136 - 145 mmol/L Final   05/21/2023 0545 140 136 - 145 mmol/L Final      Potassium Potassium   Date/Time Value Ref Range Status   05/23/2023 0314 4.0 3.5 - 5.2 mmol/L Final   05/22/2023 0531 4.4 3.5 - 5.2 mmol/L Final   05/21/2023 0545 4.3 3.5 - 5.2 mmol/L Final      Chloride Chloride   Date/Time Value Ref Range Status   05/23/2023 0314 98 98 - 107 mmol/L Final   05/22/2023 0531 98 98 - 107 mmol/L Final   05/21/2023 0545 103 98 - 107 mmol/L Final      Bicarbonate No results found for: PLASMABICARB   BUN BUN   Date/Time Value Ref Range Status   05/23/2023 0314 38 (H) 8 - 23 mg/dL Final   05/22/2023 0531 53 (H) 8 - 23 mg/dL Final   05/21/2023 0545 38 (H) 8 - 23 mg/dL Final      Creatinine Creatinine   Date/Time Value Ref Range Status   05/23/2023 0314 3.07 (H) 0.57 - 1.00 mg/dL Final   05/22/2023 0531 3.24 (H) 0.57 - 1.00 mg/dL Final   05/21/2023 0545 2.56 (H) 0.57 - 1.00 mg/dL Final      Calcium Calcium   Date/Time Value Ref Range Status   05/23/2023 0314 8.6 8.6 - 10.5 mg/dL Final   05/22/2023 0531 8.7 8.6 - 10.5 mg/dL Final   05/21/2023 0545 8.6 8.6 - 10.5 mg/dL Final      Magnesium Magnesium   Date/Time Value Ref Range Status   05/21/2023 0545 1.5 (L) 1.6 - 2.4 mg/dL Final        Imaging Results (Last 72 Hours)     Procedure Component Value Units Date/Time    XR Chest 1 View [[id number]] Collected: 05/22/23 0707     Updated: 05/22/23 0729    Narrative:      FINDINGS:  Right chest tube in place.  No definite pneumothorax.  Heart size upper limits  of normal.  No focal pulmonary consolidation.  Significantly improved aeration  of the lungs when compared with 5/21/2023.    XR Chest 1 View [[id number]] Collected: 05/21/23 0600     Updated: 05/21/23 0604    Narrative:      XR CHEST 1 VIEW    HISTORY: pleural effusion    COMPARISON: 5/20/2023.    FINDINGS:  Frontal view of the chest was obtained.    There is diffuse interstitial airspace  disease, right greater than left..Lung  aeration on the right is improved.  The heart is borderline enlarged.. Right  pleural effusion is dramatically improved, status post chest tube placement.    The osseous structures and surrounding soft tissues demonstrate no acute  abnormality.    Upper abdomen is unremarkable.        Impression:      1. Right pleural effusion is slightly improved status post chest tube placement.  2. There is diffuse airspace disease on the right after lung reexpansion.                amLODIPine, 5 mg, Oral, Once  carvedilol, 25 mg, Oral, BID With Meals  Insulin Aspart, 0-7 Units, Subcutaneous, TID AC  insulin detemir, 7 Units, Subcutaneous, Nightly  lisinopril, 2.5 mg, Oral, BID  pantoprazole, 40 mg, Oral, Q AM  senna-docusate sodium, 2 tablet, Oral, BID  sodium chloride, 10 mL, Intravenous, Q12H             Patient Active Problem List   Diagnosis Code   • Pleural effusion J90       Assessment & Plan    RIGHT Lung adequately drained   CT discontinued  Follow up CXR  RTC 1 week suture removal      HEMANT Ernst  05/23/23  10:41 CDT

## 2023-05-23 NOTE — PROGRESS NOTES
"[facility]  [address] [place] [address]. [address]  T - [phone]  F - [phone]     Progress Note          PATIENT  DEMOGRAPHICS   PATIENT NAME: Harmony Montoya                      PHYSICIAN: Taz Adams MD  : 1950  MRN: [id number]   LOS: 3 days    Patient Care Team:  Provider, No Known as PCP - General  Subjective   SUBJECTIVE   Feels well, likes to go home          Objective   OBJECTIVE   Vital Signs  Temp:  [97 °F (36.1 °C)-97.9 °F (36.6 °C)] 97.5 °F (36.4 °C)  Heart Rate:  [54-67] 59  Resp:  [16-18] 18  BP: ()/(60-94) 146/64    Flowsheet Rows    Flowsheet Row First Filed Value   Admission Height 160 cm (63\") Documented at 2023   Admission Weight 60.7 kg (133 lb 12.8 oz) Documented at 2023           I/O last 3 completed shifts:  In: 240 [P.O.:240]  Out: 2790 [Urine:300; Other:2300; Chest Tube:190]    PHYSICAL EXAM    Physical Exam  Constitutional:       Appearance: She is well-developed.   HENT:      Head: Normocephalic.   Eyes:      Pupils: Pupils are equal, round, and reactive to light.   Cardiovascular:      Rate and Rhythm: Normal rate and regular rhythm.      Heart sounds: Normal heart sounds.   Pulmonary:      Effort: Pulmonary effort is normal.      Breath sounds: Normal breath sounds.   Abdominal:      General: Bowel sounds are normal.      Palpations: Abdomen is soft.   Skin:     Coloration: Skin is not jaundiced.   Neurological:      General: No focal deficit present.      Mental Status: She is alert and oriented to person, place, and time.         RESULTS   Results Review:    Results from last 7 days   Lab Units 23  0314 23  0531 23  0545   SODIUM mmol/L 135* 132* 140   POTASSIUM mmol/L 4.0 4.4 4.3   CHLORIDE mmol/L 98 98 103   CO2 mmol/L 26.0 24.0 21.0*   BUN mg/dL 38* 53* 38*   CREATININE mg/dL 3.07* 3.24* 2.56*   CALCIUM mg/dL 8.6 8.7 8.6   GLUCOSE mg/dL 261* 337* 161*       Estimated Creatinine " Clearance: 13.5 mL/min (A) (by C-G formula based on SCr of 3.07 mg/dL (H)).    Results from last 7 days   Lab Units 05/21/23  0545 05/20/23  0424   MAGNESIUM mg/dL 1.5* 1.8             Results from last 7 days   Lab Units 05/23/23  0314 05/22/23  0531 05/21/23  0545 05/20/23  0424   WBC 10*3/mm3 8.58 9.31 8.82 6.40   HEMOGLOBIN g/dL 12.2 12.0 12.6 11.5*   PLATELETS 10*3/mm3 204 253 295 292               Imaging Results (Last 24 Hours)     Procedure Component Value Units Date/Time    XR Chest 1 View [[id number]] Collected: 05/22/23 0707     Updated: 05/22/23 0729    Narrative:      FINDINGS:  Right chest tube in place.  No definite pneumothorax.  Heart size upper limits  of normal.  No focal pulmonary consolidation.  Significantly improved aeration  of the lungs when compared with 5/21/2023.           MEDICATIONS    amLODIPine, 5 mg, Oral, Once  carvedilol, 25 mg, Oral, BID With Meals  Insulin Aspart, 0-7 Units, Subcutaneous, TID AC  insulin detemir, 7 Units, Subcutaneous, Nightly  lisinopril, 2.5 mg, Oral, BID  pantoprazole, 40 mg, Oral, Q AM  senna-docusate sodium, 2 tablet, Oral, BID  sodium chloride, 10 mL, Intravenous, Q12H           Assessment & Plan   ASSESSMENT / PLAN      Pleural effusion    1. End-stage renal disease on PD   - currently training for PD, last pd on thursday  - Has left UE AV fistula for HD  -We will continue hemodialysis while inpatient,  HD done yesterday  -s/p chest tube 1500 cc fluid drained- pleural fluid glucose was 163 which means pleural fluid is not as a result of pd fluid leak.   -Renal diet  - ok to discharge from renal standpoint and then she can continue her home PD training    2. Hypertension-   On carvedilol and lisinopril  Will Monitor.      3. Anemia-resolved  -Monitor hemoglobin transfuse if hemoglobin less than 7     4. Volume overload- improved  -We will manage volume with dialysis     5. Hyperkalemia-improved  -We will manage electrolytes including potassium with  dialysis     6. Severe right sided pleural effusion  s/p chest tube 1500 cc fluid drained  pleural fluid glucose was 163 - pleural effusion is not due to pd fluid. Chest tube is discontinue.              This document has been electronically signed by Taz Adams MD on May 23, 2023 07:13 CDT      I have reviewed the case with the resident. I have also examined and seen  the patient.  I agree with the assessment and plan as documented in the resident's note.         This document has been electronically signed by Shayna Abreu MD on May 23, 2023 11:48 CDT

## 2023-05-23 NOTE — DISCHARGE SUMMARY
[facility]  DISCHARGE SUMMARY       Date of Admission: 5/19/2023  Date of Discharge:  5/23/2023  Primary Care Physician: Uyen Colon APRN    Presenting Problem/History of Present Illness:  Pleural effusion [J90]   ***    Final Discharge Diagnoses:  Active Hospital Problems    Diagnosis    • **Pleural effusion        Consults:   Consults     Date and Time Order Name Status Description    5/19/2023  9:41 PM Inpatient Cardiothoracic Surgery Consult Completed     5/19/2023  9:41 PM Inpatient Nephrology Consult Completed           Procedures Performed:                 Pertinent Test Results:   Lab Results (most recent)     Procedure Component Value Units Date/Time    POC Glucose Once [[id number]]  (Abnormal) Collected: 05/23/23 1130    Specimen: Blood Updated: 05/23/23 1142     Glucose 247 mg/dL      Comment: RN NotifiedOperator: [id number] SOEI CHRISTYMeter ID: [id number]       POC Glucose Once [[id number]]  (Abnormal) Collected: 05/23/23 0610    Specimen: Blood Updated: 05/23/23 1128     Glucose 190 mg/dL      Comment: RN NotifiedOperator: [id number] ANISH AMANDAMeter ID: [id number]       Basic Metabolic Panel [[id number]]  (Abnormal) Collected: 05/23/23 0314    Specimen: Blood Updated: 05/23/23 0355     Glucose 261 mg/dL      BUN 38 mg/dL      Creatinine 3.07 mg/dL      Sodium 135 mmol/L      Potassium 4.0 mmol/L      Chloride 98 mmol/L      CO2 26.0 mmol/L      Calcium 8.6 mg/dL      BUN/Creatinine Ratio 12.4     Anion Gap 11.0 mmol/L      eGFR 15.5 mL/min/1.73     Narrative:      GFR Normal >60  Chronic Kidney Disease <60  Kidney Failure <15    The GFR formula is only valid for adults with stable renal function between ages 18 and 70.    CBC & Differential [[id number]]  (Abnormal) Collected: 05/23/23 0314    Specimen: Blood Updated: 05/23/23 0337    Narrative:      The following orders were created for panel order CBC & Differential.  Procedure                                Abnormality         Status                     ---------                               -----------         ------                     CBC Auto Differential[[id number]]        Abnormal            Final result                 Please view results for these tests on the individual orders.    CBC Auto Differential [[id number]]  (Abnormal) Collected: 05/23/23 0314    Specimen: Blood Updated: 05/23/23 0337     WBC 8.58 10*3/mm3      RBC 3.86 10*6/mm3      Hemoglobin 12.2 g/dL      Hematocrit 36.0 %      MCV 93.3 fL      MCH 31.6 pg      MCHC 33.9 g/dL      RDW 15.9 %      RDW-SD 53.9 fl      MPV 10.6 fL      Platelets 204 10*3/mm3      Neutrophil % 60.5 %      Lymphocyte % 25.5 %      Monocyte % 9.6 %      Eosinophil % 3.4 %      Basophil % 0.8 %      Immature Grans % 0.2 %      Neutrophils, Absolute 5.19 10*3/mm3      Lymphocytes, Absolute 2.19 10*3/mm3      Monocytes, Absolute 0.82 10*3/mm3      Eosinophils, Absolute 0.29 10*3/mm3      Basophils, Absolute 0.07 10*3/mm3      Immature Grans, Absolute 0.02 10*3/mm3      nRBC 0.0 /100 WBC     Basic Metabolic Panel [[id number]]  (Abnormal) Collected: 05/22/23 0531    Specimen: Blood Updated: 05/22/23 0556     Glucose 337 mg/dL      BUN 53 mg/dL      Creatinine 3.24 mg/dL      Sodium 132 mmol/L      Potassium 4.4 mmol/L      Chloride 98 mmol/L      CO2 24.0 mmol/L      Calcium 8.7 mg/dL      BUN/Creatinine Ratio 16.4     Anion Gap 10.0 mmol/L      eGFR 14.5 mL/min/1.73      Comment: <15 Indicative of kidney failure       Narrative:      GFR Normal >60  Chronic Kidney Disease <60  Kidney Failure <15    The GFR formula is only valid for adults with stable renal function between ages 18 and 70.    CBC & Differential [[id number]]  (Abnormal) Collected: 05/22/23 0531    Specimen: Blood Updated: 05/22/23 0540    Narrative:      The following orders were created for panel order CBC & Differential.  Procedure                               Abnormality         Status                      ---------                               -----------         ------                     CBC Auto Differential[[id number]]        Abnormal            Final result                 Please view results for these tests on the individual orders.    CBC Auto Differential [[id number]]  (Abnormal) Collected: 05/22/23 0531    Specimen: Blood Updated: 05/22/23 0540     WBC 9.31 10*3/mm3      RBC 3.77 10*6/mm3      Hemoglobin 12.0 g/dL      Hematocrit 35.0 %      MCV 92.8 fL      MCH 31.8 pg      MCHC 34.3 g/dL      RDW 16.1 %      RDW-SD 54.3 fl      MPV 10.4 fL      Platelets 253 10*3/mm3      Neutrophil % 73.0 %      Lymphocyte % 14.9 %      Monocyte % 10.4 %      Eosinophil % 1.0 %      Basophil % 0.2 %      Immature Grans % 0.5 %      Neutrophils, Absolute 6.79 10*3/mm3      Lymphocytes, Absolute 1.39 10*3/mm3      Monocytes, Absolute 0.97 10*3/mm3      Eosinophils, Absolute 0.09 10*3/mm3      Basophils, Absolute 0.02 10*3/mm3      Immature Grans, Absolute 0.05 10*3/mm3      nRBC 0.0 /100 WBC     Magnesium [[id number]]  (Abnormal) Collected: 05/21/23 0545    Specimen: Blood Updated: 05/21/23 0627     Magnesium 1.5 mg/dL     Protein, Body Fluid - Pleural Fluid, Pleural Cavity [[id number]] Collected: 05/20/23 1418    Specimen: Pleural Fluid from Pleural Cavity Updated: 05/20/23 2155     Protein, Total, Fluid 1.2 g/dL     Narrative:      No Reference Ranges Established.    A serous fluid total fluid (TP) greater than 50 percent of the serum TP suggests the fluid is an exudate.      1. Pleural TP/Serum TP >0.5  2. Pleural LD/Serum LD >0.6  3. Pleural LD >2/3 of the upper limit of normal for serum LDH    This test was developed, it performance characteristics determined and judged suitable for clinical purposes by [facility].  It has not been cleared or approved by the FDA.  The laboratory is regulated under CLIA as qualified to perform high-complexity testing.     Lactate Dehydrogenase,  Body Fluid - Pleural Fluid, Pleural Cavity [[id number]] Collected: 05/20/23 1418    Specimen: Pleural Fluid from Pleural Cavity Updated: 05/20/23 2155     Lactate Dehydrogenase (LD), Fluid 76 U/L     Narrative:      No Reference Ranges Established.    Serous fluid LDH greater than 60 percent of the serum LDH or serous fluid LDH two-thirds of the upper limit of normal for serum LDH suggests the fluid is an exudate.     1. Pleural TP/Serum TP >0.5  2. Pleural LD/Serum LD >0.6  3. Pleural LD >2/3 of the upper limit of normal for serum LDH    This test was developed, it performance characteristics determined and judged suitable for clinical purposes by [facility].  It has not been cleared or approved by the FDA.  The laboratory is regulated under CLIA as qualified to perform high-complexity testing.     Glucose, Body Fluid - Pleural Fluid, Pleural Cavity [[id number]] Collected: 05/20/23 1418    Specimen: Pleural Fluid from Pleural Cavity Updated: 05/20/23 2155     Glucose, Fluid 163 mg/dL     Narrative:      No Reference Ranges Established.    Serous fluid glucose less than 60 mg/dL or less than 30 mg/dL below serum glucose suggests an infectious or malignant exudate.     This test was developed, it performance characteristics determined and judged suitable for clinical purposes by [facility].  It has not been cleared or approved by the FDA.  The laboratory is regulated under CLIA as qualified to perform high-complexity testing.     Hepatitis B Surface Antigen [[id number]]  (Normal) Collected: 05/20/23 1725    Specimen: Blood Updated: 05/20/23 1757     Hepatitis B Surface Ag Non-Reactive    Magnesium [[id number]]  (Normal) Collected: 05/20/23 0424    Specimen: Blood Updated: 05/20/23 0504     Magnesium 1.8 mg/dL         Imaging Results (Most Recent)     Procedure Component Value Units Date/Time    XR Chest 1 View [[id number]] Collected: 05/22/23 0707     Updated: 05/22/23  0729    Narrative:      FINDINGS:  Right chest tube in place.  No definite pneumothorax.  Heart size upper limits  of normal.  No focal pulmonary consolidation.  Significantly improved aeration  of the lungs when compared with 5/21/2023.    XR Chest 1 View [[id number]] Collected: 05/21/23 0600     Updated: 05/21/23 0604    Narrative:      XR CHEST 1 VIEW    HISTORY: pleural effusion    COMPARISON: 5/20/2023.    FINDINGS:  Frontal view of the chest was obtained.    There is diffuse interstitial airspace disease, right greater than left..Lung  aeration on the right is improved.  The heart is borderline enlarged.. Right  pleural effusion is dramatically improved, status post chest tube placement.    The osseous structures and surrounding soft tissues demonstrate no acute  abnormality.    Upper abdomen is unremarkable.        Impression:      1. Right pleural effusion is slightly improved status post chest tube placement.  2. There is diffuse airspace disease on the right after lung reexpansion.        CT Chest Without Contrast Diagnostic [[id number]] Collected: 05/20/23 0923     Updated: 05/20/23 0928    Narrative:      TECHNIQUE:  Axial images from the thoracic inlet through the levels of the diaphragms were  performed followed by 2-D multiplanar reformats.  No contrast.    FINDINGS:  No pathologically enlarged mediastinal, hilar, or axillary lymph nodes.  There  is a large right pleural effusion with right basilar consolidation.      Impression:      Large right pleural effusion with right basilar consolidation.  The left lung is  clear.      XR Chest 1 View [[id number]] Collected: 05/20/23 0802     Updated: 05/20/23 0814    Narrative:      COMPARISON:  No comparison.    HISTORY:  Pleural effusion.      Impression:      FINDINGS/IMPRESSION:  1.   Cardiomegaly.  Moderate-sized right pleural effusion with underlying right  perihilar and right basilar airspace disease which could represent pneumonia,  atelectasis, or a  "combination thereof.  An underlying obstructing lesion or mass  could also be present.    2.  Recommend short-term follow-up to document complete resolution.  Alternatively, this could be further evaluated with dedicated CT scan of the  chest with contrast, as indicated.            Chief Complaint on Day of Discharge: ***    Hospital Course:  The patient is a 73 y.o. female who presented to [facility] with ***.      Condition on Discharge:  ***    Physical Exam on Discharge:  /56 (BP Location: Right arm, Patient Position: Lying)   Pulse 57   Temp 97.3 °F (36.3 °C) (Temporal)   Resp 16   Ht 160 cm (63\")   Wt 53.9 kg (118 lb 12.8 oz)   SpO2 93%   BMI 21.04 kg/m²   Physical Exam  ***    Discharge Disposition:  Home or Self Care    Discharge Medications:     Discharge Medications      Continue These Medications      Instructions Start Date   aspirin 81 MG chewable tablet   81 mg, Oral, Daily      carvedilol 25 MG tablet  Commonly known as: COREG   25 mg, Oral, 2 Times Daily With Meals      clopidogrel 75 MG tablet  Commonly known as: PLAVIX   75 mg, Oral, Daily      diphenhydrAMINE 25 mg capsule  Commonly known as: BENADRYL   25 mg, Oral, Daily      insulin glargine 100 UNIT/ML injection  Commonly known as: LANTUS, SEMGLEE   7 Units, Subcutaneous, Daily      Insulin Lispro 100 UNIT/ML injection  Commonly known as: humaLOG   6 Units, Subcutaneous, Daily      lisinopril 2.5 MG tablet  Commonly known as: PRINIVIL,ZESTRIL   2.5 mg, Oral, 2 Times Daily      omeprazole 40 MG capsule  Commonly known as: priLOSEC   40 mg, Oral, Daily      Vitamin C 500 MG capsule   1 capsule, Oral      vitamin D3 125 MCG (5000 UT) capsule capsule   1,000 Units, Oral, Daily             Discharge Diet:   Diet Instructions     Diet: Renal Diets; Low Potassium, Low Sodium (2-3g); Regular Texture (IDDSI 7); Thin (IDDSI 0)      Discharge Diet: Renal Diets    Renal Diet:  Low Potassium  Low Sodium (2-3g)       Texture: " Regular Texture (IDDSI 7)    Fluid Consistency: Thin (IDDSI 0)          Activity at Discharge:   Activity Instructions     Activity as Tolerated            Discharge Care Plan/Instructions: ***    Follow-up Appointments:   No future appointments.    Test Results Pending at Discharge:     Carlos Alberto Lopez MD    Time: ***

## 2023-05-23 NOTE — NURSING NOTE
Pt IV infiltrated upon assessment. Pt refusing another IV insertion at this time. Pt educated about importance of IV access if emergency were to arise. Will reevaluate.

## 2023-05-23 NOTE — SIGNIFICANT NOTE
05/23/23 1446   OTHER   Discipline physical therapist   Rehab Time/Intention   Session Not Performed other (see comments)   Therapy Assessment/Plan (PT)   Criteria for Skilled Interventions Met (PT) no;no problems identified which require skilled intervention     PT Jolynn vargas, RN states patient is Ind and getting ready to be discharged. PT to sign off.

## 2023-05-23 NOTE — PLAN OF CARE
Goal Outcome Evaluation:   Pt is alert and oriented x 4. Sinus padma- NSR throughout the night. Pt has been HTN. Refused meds. IV access was lost. Pt refused to allow staff to attempt to obtain IV access. Chest tube in place.

## 2023-05-24 ENCOUNTER — READMISSION MANAGEMENT (OUTPATIENT)
Dept: CALL CENTER | Facility: HOSPITAL | Age: 73
End: 2023-05-24
Payer: OTHER GOVERNMENT

## 2023-05-24 NOTE — OUTREACH NOTE
Prep Survey      Flowsheet Row Responses   [facility] facility patient discharged from? [place]   Is LACE score < 7 ? No   Eligibility Readm Mgmt   Discharge diagnosis Pleural effusion   Does the patient have one of the following disease processes/diagnoses(primary or secondary)? Other   Does the patient have Home health ordered? No   Is there a DME ordered? No   Prep survey completed? Yes            Tanya ARANA - Registered Nurse

## 2023-05-26 ENCOUNTER — APPOINTMENT (OUTPATIENT)
Dept: GENERAL RADIOLOGY | Facility: HOSPITAL | Age: 73
End: 2023-05-26
Payer: OTHER GOVERNMENT

## 2023-05-26 ENCOUNTER — HOSPITAL ENCOUNTER (EMERGENCY)
Facility: HOSPITAL | Age: 73
Discharge: HOME | End: 2023-05-26
Attending: EMERGENCY MEDICINE
Payer: OTHER GOVERNMENT

## 2023-05-26 ENCOUNTER — NURSE TRIAGE (OUTPATIENT)
Dept: CALL CENTER | Facility: HOSPITAL | Age: 73
End: 2023-05-26
Payer: OTHER GOVERNMENT

## 2023-05-26 VITALS
SYSTOLIC BLOOD PRESSURE: 142 MMHG | WEIGHT: 123.3 LBS | OXYGEN SATURATION: 93 % | BODY MASS INDEX: 21.85 KG/M2 | TEMPERATURE: 97.9 F | DIASTOLIC BLOOD PRESSURE: 64 MMHG | HEART RATE: 58 BPM | RESPIRATION RATE: 18 BRPM | HEIGHT: 63 IN

## 2023-05-26 DIAGNOSIS — Z48.89 ENCOUNTER FOR POSTOPERATIVE WOUND CHECK: Primary | ICD-10-CM

## 2023-05-26 DIAGNOSIS — J90 PLEURAL EFFUSION: ICD-10-CM

## 2023-05-26 PROCEDURE — 71046 X-RAY EXAM CHEST 2 VIEWS: CPT

## 2023-05-26 PROCEDURE — 99283 EMERGENCY DEPT VISIT LOW MDM: CPT

## 2023-05-26 NOTE — ED PROVIDER NOTES
Subjective   History of Present Illness  This is a 73-year-old peritoneal dialysis patient who presents for evaluation of a right chest wound that is draining.  The patient had a right sided tube thoracostomy due to a pleural effusion and the tube was removed several days ago and a dressing was placed.  The patient feels that clear fluid has been emanating from the wound that is otherwise normal-appearing and that she is going through dressings much more frequently than she would have initially suspected she would.  She denies fevers or chills.  She denies chest pain.  She denies shortness of breath.  She denies rash.  She denies purulent discharge or bloody discharge.        Review of Systems   All other systems reviewed and are negative.      History reviewed. No pertinent past medical history.    Allergies   Allergen Reactions   • Statins Swelling       History reviewed. No pertinent surgical history.    History reviewed. No pertinent family history.    Social History     Socioeconomic History   • Marital status: Single   Tobacco Use   • Smoking status: Former     Types: Cigarettes     Quit date: 2021     Years since quittin.0   • Smokeless tobacco: Never   Vaping Use   • Vaping Use: Never used   Substance and Sexual Activity   • Alcohol use: Never   • Drug use: Never           Objective   Physical Exam  Vitals and nursing note reviewed.   Constitutional:       Appearance: She is not ill-appearing.   HENT:      Head: Normocephalic.      Nose: Nose normal.      Mouth/Throat:      Mouth: Mucous membranes are moist.   Eyes:      General: No scleral icterus.  Cardiovascular:      Rate and Rhythm: Normal rate and regular rhythm.   Pulmonary:      Effort: Pulmonary effort is normal.      Breath sounds: Normal breath sounds.   Abdominal:      General: Abdomen is flat.      Tenderness: There is no abdominal tenderness.      Comments: PD catheter in place without any evidence of leak from the site.   Skin:      "General: Skin is warm and dry.      Findings: No rash.      Comments: Linear laceration below the right lateral breast horizontally without any wound dehiscence and sutures in place.  No redness asymmetrical warmth or purulent discharge.  Serous fluid present on the skin.   Neurological:      Mental Status: She is alert and oriented to person, place, and time.   Psychiatric:         Behavior: Behavior normal.         Procedures           ED Course                                           Medical Decision Making  The patient's recent past medical charts for this facility as well as outside facilities via the \"care everywhere\" application of epic reviewed.  The patient was discharged from the hospital 3 days ago after a tube thoracotomy for drainage of a pleural effusion.  Instructions at discharge were for suture removal in 1 week.    The differential diagnosis includes pneumothorax, hemothorax, recurrent pleural effusion, and normal postoperative wound draining seroma, among others.    On final reevaluation the patient is in stable condition.  We discussed discharge instructions and need for follow-up.  We discussed reasons for early return to the emergency department.    Encounter for postoperative wound check: acute illness or injury  Pleural effusion: acute illness or injury  Amount and/or Complexity of Data Reviewed  Radiology: ordered.  Discussion of management or test interpretation with external provider(s): Case discussed with Dr. Banks CT surgery regarding postoperative wound with pleural effusion and drainage.  Based on the patient's chest x-ray findings history and physical exam and prior knowledge of the patient he believes that the patient is in stable condition for the weekend.  He recommends local measures at the wound to decrease the amount of drainage but would not recommend admission or repeat chest tube placement.  He believes the patient could use more aggressive dialysis and occasional " thoracentesis as needed on an outpatient basis.  He asked that the patient call his office to make an appointment for next Tuesday.    Risk  OTC drugs.  Prescription drug management.  Decision regarding hospitalization.          Final diagnoses:   Encounter for postoperative wound check   Pleural effusion       ED Disposition  ED Disposition     ED Disposition   Discharge    Condition   Stable    Comment   --             Uyen Colon, APRN  [address]  [address]  [facility] [id number] [phone]      As needed    Linden Banks MD  [address] [place] [address] [address] [place] [place] [address] [phone]    Call today  Please call today during business hours to make an appointment for next Tuesday.  Let the office know that this was requested by Dr. Banks during your emergency department visit.    [facility] EMERGENCY DEPARTMENT  [address]  [facility] [address] [phone]    As needed, If symptoms worsen at any time         Medication List      No changes were made to your prescriptions during this visit.          Leroy Fulton,   05/26/23 2234

## 2023-05-26 NOTE — ED NOTES
Pt presents to ED with c/o leaking post op wound after chest tube was removed. States she was seen in [facility] ED yesterday and surgeon from here advised to place stitches and she was sent home but by the time she got home wound was leaking again.

## 2023-05-31 ENCOUNTER — READMISSION MANAGEMENT (OUTPATIENT)
Dept: CALL CENTER | Facility: HOSPITAL | Age: 73
End: 2023-05-31

## 2023-05-31 NOTE — OUTREACH NOTE
Medical Week 1 Survey    Flowsheet Row Responses   [facility] patient discharged from? [place]   Does the patient have one of the following disease processes/diagnoses(primary or secondary)? Other   Week 1 attempt successful? No   Unsuccessful attempts Attempt 1          Damián ARMENDARIZ - Registered Nurse

## 2023-06-02 ENCOUNTER — OFFICE VISIT (OUTPATIENT)
Dept: CARDIAC SURGERY | Facility: CLINIC | Age: 73
End: 2023-06-02
Payer: OTHER GOVERNMENT

## 2023-06-02 DIAGNOSIS — J90 PLEURAL EFFUSION: ICD-10-CM

## 2023-06-02 DIAGNOSIS — Z48.02 ENCOUNTER FOR REMOVAL OF SUTURES: Primary | ICD-10-CM

## 2023-06-02 PROCEDURE — 99214 OFFICE O/P EST MOD 30 MIN: CPT | Performed by: NURSE PRACTITIONER

## 2023-06-02 NOTE — PROGRESS NOTES
CVTS Office Progress Note       Subjective   Patient ID: Harmony Montoya is a 73 y.o. female is here today for follow-up.    Chief Complaint:  suture removal    The following portions of the patient's history were reviewed and updated as appropriate: allergies, current medications, past family history, past medical history, past social history, past surgical history and problem list.  Recent images independently reviewed.  Available laboratory values reviewed.    PCP:  Uyen Colon APRN      73 y.o. female with HTN(stable, increased risk stroke, rupture), Hyperlipidemia(stable, increased risk cardiovascular events), Diabetes Mellitus(stable, increased risk cardiovascular events) and Chronic Kidney Disease(stable, increased risk renal failure) HFrEF, ESRD on dialysis, former hemodialysis patient, prior surgical history includes cardiac PCI AV fistula placement by Dr. Iraheta. former smoker.  1 week history of shortness of breath, worse when lying flat. Inadequate PD dialysis.  Patient recently switched from hemodialysis to peritoneal dialysis this week, reported to emergency department at [facility] chest film demonstrated right pleural effusion transferred to [facility] for CT surgery consideration.  Most of patient's cardiac history performed at [facility] in [place].  No other associated symptoms or modifying factors. CT placed for adequate drainage. Removed. Returns for wound check.     2023 LEFT Fistula ([place])  2022 TTE: EF 40%  2022 CABGx1 CAROLINE, Dr. Post [facility]  2023 CXR: Moderate right pleural effusion somewhat loculated  2023: RIGHT CT Placement      No past medical history on file.  No past surgical history on file.  No family history on file.  Social History     Tobacco Use    Smoking status: Former     Types: Cigarettes     Quit date: 2021     Years since quittin.0    Smokeless tobacco: Never   Vaping Use    Vaping Use: Never used   Substance Use Topics     Alcohol use: Never    Drug use: Never       ALLERGIES:   Statins    MEDICATIONS:      Current Outpatient Medications:     Ascorbic Acid (Vitamin C) 500 MG capsule, Take 1 capsule by mouth., Disp: , Rfl:     aspirin 81 MG chewable tablet, Chew 1 tablet Daily., Disp: , Rfl:     carvedilol (COREG) 25 MG tablet, Take 1 tablet by mouth 2 (Two) Times a Day With Meals., Disp: , Rfl:     clopidogrel (PLAVIX) 75 MG tablet, Take 1 tablet by mouth Daily., Disp: , Rfl:     diphenhydrAMINE (BENADRYL) 25 mg capsule, Take 1 capsule by mouth Daily., Disp: , Rfl:     insulin glargine (LANTUS, SEMGLEE) 100 UNIT/ML injection, Inject 7 Units under the skin into the appropriate area as directed Daily., Disp: , Rfl:     Insulin Lispro (humaLOG) 100 UNIT/ML injection, Inject 6 Units under the skin into the appropriate area as directed Daily., Disp: , Rfl:     lisinopril (PRINIVIL,ZESTRIL) 2.5 MG tablet, Take 1 tablet by mouth 2 (Two) Times a Day., Disp: , Rfl:     omeprazole (priLOSEC) 40 MG capsule, Take 1 capsule by mouth Daily., Disp: , Rfl:     vitamin D3 125 MCG (5000 UT) capsule capsule, Take 1,000 Units by mouth Daily., Disp: , Rfl:     Review of Systems   Eyes:  Negative for visual disturbance.   Cardiovascular:  Positive for dyspnea on exertion and leg swelling. Negative for claudication and cyanosis.   Respiratory:  Negative for shortness of breath.    Skin:  Negative for color change and nail changes.   Musculoskeletal:  Negative for muscle weakness.   Gastrointestinal:  Negative for dysphagia.   Neurological:  Negative for focal weakness, light-headedness, loss of balance, numbness, paresthesias and weakness.   Psychiatric/Behavioral:  Negative for altered mental status.    All other systems reviewed and are negative.     Objective   There were no vitals filed for this visit.  There is no height or weight on file to calculate BMI.  Physical Exam  Vitals reviewed.   HENT:      Head: Normocephalic.      Mouth/Throat:      Mouth:  Mucous membranes are moist.   Eyes:      Extraocular Movements: Extraocular movements intact.   Cardiovascular:      Rate and Rhythm: Normal rate.      Heart sounds: Normal heart sounds.   Pulmonary:      Effort: Pulmonary effort is normal.      Breath sounds: Normal breath sounds.   Abdominal:      General: Bowel sounds are normal.      Palpations: Abdomen is soft.   Musculoskeletal:         General: Normal range of motion.      Cervical back: Normal range of motion.   Skin:     General: Skin is warm and dry.      Capillary Refill: Capillary refill takes less than 2 seconds.      Comments: (R) CT suture removed. Steri-strips applied   Neurological:      Mental Status: She is alert and oriented to person, place, and time.      Gait: Gait normal.   Psychiatric:         Mood and Affect: Mood normal.         Behavior: Behavior normal.         Assessment & Plan     Independent Review of Radiographic Studies:  Detailed discussion regarding risks, benefits, and treatment plan. Images independently reviewed. Patient understands, agrees, and wishes to proceed with plan.     1. Encounter for removal of sutures  Suture removed from CT site. CDI.   Steri strips applied  Clean operative site with antibacterial soap/water, pat dry. Keep open to air unless draining, then may apply dry dressing.  No ointments or creams unless prescribed by provider.     2. Pleural effusion  Resolved.       Advance Care Planning discussed and  Informational packet given to patient. Advance Care Plan on file: No    Time spent 30 minutes in face to face evaluation, reviewing of medical history, tests, and procedures. Independent interpretation of vascular studies, ordering additional tests, documentation, and coordination of care.   Counseling and education with the patient and family regarding treatment options, plan of care, and hoped for outcomes. All questions answered.           This document has been electronically signed by Tova Giang  APRN on June 5, 2023 15:07 CDT

## 2023-06-09 ENCOUNTER — READMISSION MANAGEMENT (OUTPATIENT)
Dept: CALL CENTER | Facility: HOSPITAL | Age: 73
End: 2023-06-09
Payer: OTHER GOVERNMENT

## 2023-06-09 NOTE — OUTREACH NOTE
Medical Week 3 Survey      Flowsheet Row Responses   [facility] patient discharged from? [place]   Does the patient have one of the following disease processes/diagnoses(primary or secondary)? Other   Week 3 attempt successful? No   Unsuccessful attempts Attempt 1   Discharge diagnosis Pleural effusion            ANY WARD - Registered Nurse

## 2023-06-14 ENCOUNTER — READMISSION MANAGEMENT (OUTPATIENT)
Dept: CALL CENTER | Facility: HOSPITAL | Age: 73
End: 2023-06-14
Payer: OTHER GOVERNMENT

## 2023-06-14 NOTE — OUTREACH NOTE
Medical Week 3 Survey      Flowsheet Row Responses   [facility] patient discharged from? [place]   Does the patient have one of the following disease processes/diagnoses(primary or secondary)? Other   Week 3 attempt successful? Yes   Call start time 1729   Call end time 1736   Discharge diagnosis Pleural effusion   Person spoke with today (if not patient) and relationship Patient   Meds reviewed with patient/caregiver? Yes   Does the patient have all medications ordered at discharge? N/A  [No new meds ordered at discharge. ]   Is the patient taking all medications as directed (includes completed medication regime)? Yes   Comments regarding appointments On dialysis   Does the patient have a primary care provider?  Yes   Comments regarding PCP HEMANT Boogie   Has the patient kept scheduled appointments due by today? Yes   Has home health visited the patient within 72 hours of discharge? N/A   Psychosocial issues? No   Did the patient receive a copy of their discharge instructions? Yes   What is the patient's perception of their health status since discharge? New symptoms unrelated to diagnosis  [Patient reports that she has a cold. Advised to contact PCP for appt. ]   Is the patient/caregiver able to teach back signs and symptoms related to disease process for when to call PCP? Yes   Is the patient/caregiver able to teach back the hierarchy of who to call/visit for symptoms/problems? PCP, Specialist, Home health nurse, Urgent Care, ED, 911 Yes   If the patient is a current smoker, are they able to teach back resources for cessation? Not a smoker   Week 3 Call Completed? Yes   Graduated Yes  [No calls post week 3]   Is the patient interested in additional calls from an ambulatory ?  NOTE:  applies to high risk patients requiring additional follow-up. No            RUSSELL TUCKER - Registered Nurse

## 2023-11-15 ENCOUNTER — HOSPITAL ENCOUNTER (INPATIENT)
Facility: HOSPITAL | Age: 73
LOS: 9 days | Discharge: HOME | DRG: 299 | End: 2023-11-24
Attending: FAMILY MEDICINE | Admitting: INTERNAL MEDICINE
Payer: OTHER GOVERNMENT

## 2023-11-15 ENCOUNTER — APPOINTMENT (OUTPATIENT)
Dept: GENERAL RADIOLOGY | Facility: HOSPITAL | Age: 73
DRG: 299 | End: 2023-11-15
Payer: OTHER GOVERNMENT

## 2023-11-15 ENCOUNTER — APPOINTMENT (OUTPATIENT)
Dept: ULTRASOUND IMAGING | Facility: HOSPITAL | Age: 73
DRG: 299 | End: 2023-11-15
Payer: OTHER GOVERNMENT

## 2023-11-15 DIAGNOSIS — I25.10 CORONARY ARTERY DISEASE INVOLVING NATIVE CORONARY ARTERY OF NATIVE HEART WITHOUT ANGINA PECTORIS: ICD-10-CM

## 2023-11-15 DIAGNOSIS — R52 INTRACTABLE PAIN: ICD-10-CM

## 2023-11-15 DIAGNOSIS — M79.606 ISCHEMIC REST PAIN OF LOWER EXTREMITY: ICD-10-CM

## 2023-11-15 DIAGNOSIS — Z74.09 IMPAIRED MOBILITY: ICD-10-CM

## 2023-11-15 DIAGNOSIS — N18.6 END STAGE RENAL DISEASE ON DIALYSIS: Primary | ICD-10-CM

## 2023-11-15 DIAGNOSIS — I99.8 ISCHEMIC REST PAIN OF LOWER EXTREMITY: ICD-10-CM

## 2023-11-15 DIAGNOSIS — I70.209 ARTERIAL OCCLUSION, LOWER EXTREMITY: ICD-10-CM

## 2023-11-15 DIAGNOSIS — Z99.2 END STAGE RENAL DISEASE ON DIALYSIS: Primary | ICD-10-CM

## 2023-11-15 PROBLEM — I10 PRIMARY HYPERTENSION: Status: ACTIVE | Noted: 2023-11-15

## 2023-11-15 PROBLEM — K21.9 CHRONIC GERD: Status: ACTIVE | Noted: 2023-11-15

## 2023-11-15 PROBLEM — E11.9 TYPE 2 DIABETES MELLITUS, WITH LONG-TERM CURRENT USE OF INSULIN: Status: ACTIVE | Noted: 2023-11-15

## 2023-11-15 PROBLEM — Z79.4 TYPE 2 DIABETES MELLITUS, WITH LONG-TERM CURRENT USE OF INSULIN: Status: ACTIVE | Noted: 2023-11-15

## 2023-11-15 LAB
ALBUMIN SERPL-MCNC: 3.8 G/DL (ref 3.5–5.2)
ALBUMIN/GLOB SERPL: 1.2 G/DL
ALP SERPL-CCNC: 130 U/L (ref 39–117)
ALT SERPL W P-5'-P-CCNC: 7 U/L (ref 1–33)
ANION GAP SERPL CALCULATED.3IONS-SCNC: 11 MMOL/L (ref 5–15)
APTT PPP: 32.4 SECONDS (ref 24.5–36)
APTT PPP: 34.1 SECONDS (ref 24.5–36)
AST SERPL-CCNC: 12 U/L (ref 1–32)
BASOPHILS # BLD AUTO: 0.05 10*3/MM3 (ref 0–0.2)
BASOPHILS NFR BLD AUTO: 0.6 % (ref 0–1.5)
BILIRUB SERPL-MCNC: 0.3 MG/DL (ref 0–1.2)
BUN SERPL-MCNC: 49 MG/DL (ref 8–23)
BUN/CREAT SERPL: 11.4 (ref 7–25)
CALCIUM SPEC-SCNC: 9.5 MG/DL (ref 8.6–10.5)
CHLORIDE SERPL-SCNC: 105 MMOL/L (ref 98–107)
CO2 SERPL-SCNC: 25 MMOL/L (ref 22–29)
CREAT SERPL-MCNC: 4.31 MG/DL (ref 0.57–1)
CRP SERPL-MCNC: 5.03 MG/DL (ref 0–0.5)
D-LACTATE SERPL-SCNC: 1.9 MMOL/L (ref 0.5–2)
DEPRECATED RDW RBC AUTO: 50.4 FL (ref 37–54)
EGFRCR SERPLBLD CKD-EPI 2021: 10.3 ML/MIN/1.73
EOSINOPHIL # BLD AUTO: 0.1 10*3/MM3 (ref 0–0.4)
EOSINOPHIL NFR BLD AUTO: 1.2 % (ref 0.3–6.2)
ERYTHROCYTE [DISTWIDTH] IN BLOOD BY AUTOMATED COUNT: 13.9 % (ref 12.3–15.4)
ERYTHROCYTE [SEDIMENTATION RATE] IN BLOOD: 84 MM/HR (ref 0–30)
GLOBULIN UR ELPH-MCNC: 3.2 GM/DL
GLUCOSE SERPL-MCNC: 139 MG/DL (ref 65–99)
HCT VFR BLD AUTO: 34.8 % (ref 34–46.6)
HGB BLD-MCNC: 11 G/DL (ref 12–15.9)
IMM GRANULOCYTES # BLD AUTO: 0.04 10*3/MM3 (ref 0–0.05)
IMM GRANULOCYTES NFR BLD AUTO: 0.5 % (ref 0–0.5)
INR PPP: 1.1 (ref 0.91–1.09)
LYMPHOCYTES # BLD AUTO: 1.16 10*3/MM3 (ref 0.7–3.1)
LYMPHOCYTES NFR BLD AUTO: 13.7 % (ref 19.6–45.3)
MAGNESIUM SERPL-MCNC: 1.9 MG/DL (ref 1.6–2.4)
MCH RBC QN AUTO: 31.6 PG (ref 26.6–33)
MCHC RBC AUTO-ENTMCNC: 31.6 G/DL (ref 31.5–35.7)
MCV RBC AUTO: 100 FL (ref 79–97)
MONOCYTES # BLD AUTO: 0.62 10*3/MM3 (ref 0.1–0.9)
MONOCYTES NFR BLD AUTO: 7.3 % (ref 5–12)
NEUTROPHILS NFR BLD AUTO: 6.48 10*3/MM3 (ref 1.7–7)
NEUTROPHILS NFR BLD AUTO: 76.7 % (ref 42.7–76)
NRBC BLD AUTO-RTO: 0 /100 WBC (ref 0–0.2)
PLATELET # BLD AUTO: 247 10*3/MM3 (ref 140–450)
PMV BLD AUTO: 11 FL (ref 6–12)
POTASSIUM SERPL-SCNC: 4.2 MMOL/L (ref 3.5–5.2)
PROT SERPL-MCNC: 7 G/DL (ref 6–8.5)
PROTHROMBIN TIME: 14.3 SECONDS (ref 11.8–14.8)
RBC # BLD AUTO: 3.48 10*6/MM3 (ref 3.77–5.28)
SODIUM SERPL-SCNC: 141 MMOL/L (ref 136–145)
WBC NRBC COR # BLD: 8.45 10*3/MM3 (ref 3.4–10.8)

## 2023-11-15 PROCEDURE — 85025 COMPLETE CBC W/AUTO DIFF WBC: CPT | Performed by: FAMILY MEDICINE

## 2023-11-15 PROCEDURE — 0 HYDROMORPHONE 1 MG/ML SOLUTION: Performed by: FAMILY MEDICINE

## 2023-11-15 PROCEDURE — 25810000003 SODIUM CHLORIDE 0.9 % SOLUTION: Performed by: FAMILY MEDICINE

## 2023-11-15 PROCEDURE — 87040 BLOOD CULTURE FOR BACTERIA: CPT | Performed by: FAMILY MEDICINE

## 2023-11-15 PROCEDURE — 36415 COLL VENOUS BLD VENIPUNCTURE: CPT | Performed by: FAMILY MEDICINE

## 2023-11-15 PROCEDURE — 85652 RBC SED RATE AUTOMATED: CPT | Performed by: FAMILY MEDICINE

## 2023-11-15 PROCEDURE — 85730 THROMBOPLASTIN TIME PARTIAL: CPT | Performed by: FAMILY MEDICINE

## 2023-11-15 PROCEDURE — 83735 ASSAY OF MAGNESIUM: CPT | Performed by: FAMILY MEDICINE

## 2023-11-15 PROCEDURE — 83605 ASSAY OF LACTIC ACID: CPT | Performed by: FAMILY MEDICINE

## 2023-11-15 PROCEDURE — 36415 COLL VENOUS BLD VENIPUNCTURE: CPT

## 2023-11-15 PROCEDURE — 73660 X-RAY EXAM OF TOE(S): CPT

## 2023-11-15 PROCEDURE — 25010000002 MORPHINE SULFATE (PF) 2 MG/ML SOLUTION: Performed by: FAMILY MEDICINE

## 2023-11-15 PROCEDURE — 93925 LOWER EXTREMITY STUDY: CPT | Performed by: SURGERY

## 2023-11-15 PROCEDURE — 80053 COMPREHEN METABOLIC PANEL: CPT | Performed by: FAMILY MEDICINE

## 2023-11-15 PROCEDURE — 25010000002 HEPARIN (PORCINE) PER 1000 UNITS: Performed by: FAMILY MEDICINE

## 2023-11-15 PROCEDURE — 25010000002 HEPARIN (PORCINE) 25000-0.45 UT/250ML-% SOLUTION: Performed by: FAMILY MEDICINE

## 2023-11-15 PROCEDURE — 85610 PROTHROMBIN TIME: CPT | Performed by: FAMILY MEDICINE

## 2023-11-15 PROCEDURE — 25010000002 ONDANSETRON PER 1 MG: Performed by: FAMILY MEDICINE

## 2023-11-15 PROCEDURE — 86140 C-REACTIVE PROTEIN: CPT | Performed by: FAMILY MEDICINE

## 2023-11-15 PROCEDURE — 93925 LOWER EXTREMITY STUDY: CPT

## 2023-11-15 PROCEDURE — 99285 EMERGENCY DEPT VISIT HI MDM: CPT

## 2023-11-15 RX ORDER — POLYETHYLENE GLYCOL 3350 17 G/17G
17 POWDER, FOR SOLUTION ORAL DAILY PRN
Status: DISCONTINUED | OUTPATIENT
Start: 2023-11-15 | End: 2023-11-18

## 2023-11-15 RX ORDER — ASPIRIN 81 MG/1
81 TABLET, CHEWABLE ORAL DAILY
Status: DISCONTINUED | OUTPATIENT
Start: 2023-11-16 | End: 2023-11-18

## 2023-11-15 RX ORDER — MORPHINE SULFATE 2 MG/ML
2 INJECTION, SOLUTION INTRAMUSCULAR; INTRAVENOUS ONCE
Status: COMPLETED | OUTPATIENT
Start: 2023-11-15 | End: 2023-11-15

## 2023-11-15 RX ORDER — HEPARIN SODIUM 1000 [USP'U]/ML
60 INJECTION, SOLUTION INTRAVENOUS; SUBCUTANEOUS AS NEEDED
Status: DISCONTINUED | OUTPATIENT
Start: 2023-11-15 | End: 2023-11-18

## 2023-11-15 RX ORDER — HEPARIN SODIUM 1000 [USP'U]/ML
60 INJECTION, SOLUTION INTRAVENOUS; SUBCUTANEOUS ONCE
Status: COMPLETED | OUTPATIENT
Start: 2023-11-15 | End: 2023-11-15

## 2023-11-15 RX ORDER — SODIUM CHLORIDE 0.9 % (FLUSH) 0.9 %
10 SYRINGE (ML) INJECTION AS NEEDED
Status: DISCONTINUED | OUTPATIENT
Start: 2023-11-15 | End: 2023-11-18

## 2023-11-15 RX ORDER — NITROGLYCERIN 0.4 MG/1
0.4 TABLET SUBLINGUAL
Status: DISCONTINUED | OUTPATIENT
Start: 2023-11-15 | End: 2023-11-18

## 2023-11-15 RX ORDER — ONDANSETRON 2 MG/ML
4 INJECTION INTRAMUSCULAR; INTRAVENOUS ONCE
Status: COMPLETED | OUTPATIENT
Start: 2023-11-15 | End: 2023-11-15

## 2023-11-15 RX ORDER — SODIUM CHLORIDE 0.9 % (FLUSH) 0.9 %
10 SYRINGE (ML) INJECTION EVERY 12 HOURS SCHEDULED
Status: DISCONTINUED | OUTPATIENT
Start: 2023-11-15 | End: 2023-11-18

## 2023-11-15 RX ORDER — HYDROMORPHONE HYDROCHLORIDE 1 MG/ML
0.5 INJECTION, SOLUTION INTRAMUSCULAR; INTRAVENOUS; SUBCUTANEOUS EVERY 4 HOURS PRN
Status: DISCONTINUED | OUTPATIENT
Start: 2023-11-15 | End: 2023-11-18

## 2023-11-15 RX ORDER — SODIUM CHLORIDE 9 MG/ML
40 INJECTION, SOLUTION INTRAVENOUS AS NEEDED
Status: DISCONTINUED | OUTPATIENT
Start: 2023-11-15 | End: 2023-11-18

## 2023-11-15 RX ORDER — ONDANSETRON 4 MG/1
4 TABLET, FILM COATED ORAL EVERY 6 HOURS PRN
Status: DISCONTINUED | OUTPATIENT
Start: 2023-11-15 | End: 2023-11-18

## 2023-11-15 RX ORDER — BISACODYL 5 MG/1
5 TABLET, DELAYED RELEASE ORAL DAILY PRN
Status: DISCONTINUED | OUTPATIENT
Start: 2023-11-15 | End: 2023-11-18

## 2023-11-15 RX ORDER — BISACODYL 10 MG
10 SUPPOSITORY, RECTAL RECTAL DAILY PRN
Status: DISCONTINUED | OUTPATIENT
Start: 2023-11-15 | End: 2023-11-18

## 2023-11-15 RX ORDER — PANTOPRAZOLE SODIUM 40 MG/1
40 TABLET, DELAYED RELEASE ORAL
Status: DISCONTINUED | OUTPATIENT
Start: 2023-11-16 | End: 2023-11-18

## 2023-11-15 RX ORDER — HEPARIN SODIUM 1000 [USP'U]/ML
30 INJECTION, SOLUTION INTRAVENOUS; SUBCUTANEOUS AS NEEDED
Status: DISCONTINUED | OUTPATIENT
Start: 2023-11-15 | End: 2023-11-18

## 2023-11-15 RX ORDER — LISINOPRIL 2.5 MG/1
2.5 TABLET ORAL 2 TIMES DAILY
Status: DISCONTINUED | OUTPATIENT
Start: 2023-11-15 | End: 2023-11-24 | Stop reason: HOSPADM

## 2023-11-15 RX ORDER — ACETAMINOPHEN 325 MG/1
650 TABLET ORAL EVERY 4 HOURS PRN
Status: DISCONTINUED | OUTPATIENT
Start: 2023-11-15 | End: 2023-11-18

## 2023-11-15 RX ORDER — HEPARIN SODIUM 10000 [USP'U]/100ML
12 INJECTION, SOLUTION INTRAVENOUS
Status: DISCONTINUED | OUTPATIENT
Start: 2023-11-15 | End: 2023-11-20

## 2023-11-15 RX ORDER — NALOXONE HCL 0.4 MG/ML
0.4 VIAL (ML) INJECTION
Status: DISCONTINUED | OUTPATIENT
Start: 2023-11-15 | End: 2023-11-18

## 2023-11-15 RX ORDER — HYDROCODONE BITARTRATE AND ACETAMINOPHEN 5; 325 MG/1; MG/1
1 TABLET ORAL EVERY 4 HOURS PRN
Status: DISCONTINUED | OUTPATIENT
Start: 2023-11-15 | End: 2023-11-18

## 2023-11-15 RX ORDER — AMOXICILLIN 250 MG
2 CAPSULE ORAL 2 TIMES DAILY
Status: DISCONTINUED | OUTPATIENT
Start: 2023-11-15 | End: 2023-11-18

## 2023-11-15 RX ADMIN — SODIUM CHLORIDE 500 ML: 9 INJECTION, SOLUTION INTRAVENOUS at 12:38

## 2023-11-15 RX ADMIN — Medication 10 ML: at 21:51

## 2023-11-15 RX ADMIN — HEPARIN SODIUM 3400 UNITS: 1000 INJECTION, SOLUTION INTRAVENOUS; SUBCUTANEOUS at 18:52

## 2023-11-15 RX ADMIN — MORPHINE SULFATE 2 MG: 2 INJECTION, SOLUTION INTRAMUSCULAR; INTRAVENOUS at 11:59

## 2023-11-15 RX ADMIN — DOCUSATE SODIUM 50 MG AND SENNOSIDES 8.6 MG 2 TABLET: 8.6; 5 TABLET, FILM COATED ORAL at 21:50

## 2023-11-15 RX ADMIN — HEPARIN SODIUM 12 UNITS/KG/HR: 10000 INJECTION, SOLUTION INTRAVENOUS at 18:53

## 2023-11-15 RX ADMIN — HYDROMORPHONE HYDROCHLORIDE 1 MG: 1 INJECTION, SOLUTION INTRAMUSCULAR; INTRAVENOUS; SUBCUTANEOUS at 14:54

## 2023-11-15 RX ADMIN — ONDANSETRON 4 MG: 2 INJECTION INTRAMUSCULAR; INTRAVENOUS at 11:59

## 2023-11-15 RX ADMIN — LISINOPRIL 2.5 MG: 2.5 TABLET ORAL at 21:51

## 2023-11-15 NOTE — ED NOTES
Nursing report ED to floor  Harmony Montoya  73 y.o.  female    HPI:   Chief Complaint   Patient presents with    Toe Injury       Admitting doctor:   Salvatore Miller MD    Consulting provider(s):  Consults       Date and Time Order Name Status Description    11/15/2023  4:51 PM Inpatient Nephrology Consult Completed              Admitting diagnosis:   The primary encounter diagnosis was End stage renal disease on dialysis. Diagnoses of Arterial occlusion, lower extremity and Intractable pain were also pertinent to this visit.    Code status:   Current Code Status       Date Active Code Status Order ID Comments User Context       Prior            Allergies:   Statins    Intake and Output    Intake/Output Summary (Last 24 hours) at 11/15/2023 1712  Last data filed at 11/15/2023 1351  Gross per 24 hour   Intake 500 ml   Output --   Net 500 ml       Weight:       11/15/23  1045   Weight: 56.7 kg (125 lb)       Most recent vitals:   Vitals:    11/15/23 1118 11/15/23 1240 11/15/23 1346 11/15/23 1455   BP:  145/54 148/59 133/54   BP Location:       Patient Position:       Pulse: 80 67 66 70   Resp:    14   Temp:       TempSrc:       SpO2: 100% 99% 100% 100%   Weight:       Height:         Oxygen Therapy: .    Active LDAs/IV Access:   Lines, Drains & Airways       Active LDAs       Name Placement date Placement time Site Days    Peripheral IV 11/15/23 1150 Anterior;Distal;Right Forearm 11/15/23  1150  Forearm  less than 1    Peritoneal Dialysis Catheter Right lower abdomen --  --  Right lower abdomen  --                    Labs (abnormal labs have a star):   Labs Reviewed   COMPREHENSIVE METABOLIC PANEL - Abnormal; Notable for the following components:       Result Value    Glucose 139 (*)     BUN 49 (*)     Creatinine 4.31 (*)     Alkaline Phosphatase 130 (*)     eGFR 10.3 (*)     All other components within normal limits    Narrative:     GFR Normal >60  Chronic Kidney Disease <60  Kidney Failure <15    The GFR  formula is only valid for adults with stable renal function between ages 18 and 70.   PROTIME-INR - Abnormal; Notable for the following components:    INR 1.10 (*)     All other components within normal limits   C-REACTIVE PROTEIN - Abnormal; Notable for the following components:    C-Reactive Protein 5.03 (*)     All other components within normal limits   SEDIMENTATION RATE - Abnormal; Notable for the following components:    Sed Rate 84 (*)     All other components within normal limits   CBC WITH AUTO DIFFERENTIAL - Abnormal; Notable for the following components:    RBC 3.48 (*)     Hemoglobin 11.0 (*)     .0 (*)     Neutrophil % 76.7 (*)     Lymphocyte % 13.7 (*)     All other components within normal limits   MAGNESIUM - Normal   APTT - Normal   LACTIC ACID, PLASMA - Normal   BLOOD CULTURE   BLOOD CULTURE   CBC AND DIFFERENTIAL    Narrative:     The following orders were created for panel order CBC & Differential.  Procedure                               Abnormality         Status                     ---------                               -----------         ------                     CBC Auto Differential[[id number]]        Abnormal            Final result                 Please view results for these tests on the individual orders.       Meds given in ED:   Medications   Morphine sulfate (PF) injection 2 mg (2 mg Intravenous Given 11/15/23 1159)   ondansetron (ZOFRAN) injection 4 mg (4 mg Intravenous Given 11/15/23 1159)   sodium chloride 0.9 % bolus 500 mL (0 mL Intravenous Stopped 11/15/23 1351)   HYDROmorphone (DILAUDID) injection 1 mg (1 mg Intravenous Given 11/15/23 1454)     No current facility-administered medications for this encounter.       NIH Stroke Scale:       Isolation/Infection(s):  No active isolations   No active infections     COVID Testing  Collected .  Resulted .    Nursing report ED to floor:  Mental status: .  Ambulatory status: .  Precautions: .    ED nurse phone extentsion- ..

## 2023-11-15 NOTE — H&P
"    [facility] Medicine Services  HISTORY AND PHYSICAL    Date of Admission: 11/15/2023  Primary Care Physician: Provider, No Known    Subjective   Primary Historian: Patient    Chief Complaint: Left leg pain    History of Present Illness  73 year old female with PMH of ESRD, DM 2 ID, CAD, HTN, that presents to the ER with complaints of pain in left leg and inability to walk. She has had pain for about 5 weeks and has required help to walk due to pain. Yesterday she visited with Dr Juan Miguel Iraheta, general surgeon who wrote: \"I cannot feel for pulse due to severe pain of the foot. She has had no vascular studies done but highly suspect significant peripheral vascular disease. I recommend that she go to [facility] where there is vascular intervention capabilities soon as possible. \"    She was evaluated in the ER with US arterial doppler LE that is reported as: Chronic bilateral SFA and popliteal artery occlusions. There is reconstitution of flow in both tibial vessels in the lower   Extremities.    Dr Villagomez was consulted and is recommending admission to medical services, anticoagulation and possible revascularization versus amputation.     Review of Systems   Otherwise complete ROS reviewed and negative except as mentioned in the HPI.    Past Medical History:   DM 2 ID  HTN  CAD  ESRD  GERD  AOCD    Past Surgical History:  07/13/2023 REMOVAL OF INTRAPERITONEAL CANNULA OR CATHETER (SURG)   completed   04/20/2023 LAPAROSCOPY WITH INSERTION OF INTRAPERITONEAL CANNULA OR CATHETER (SURG)   completed     Av fusion direct any site   completed     Social History:  reports that she quit smoking about 2 years ago. Her smoking use included cigarettes. She has never used smokeless tobacco. She reports that she does not drink alcohol and does not use drugs. She is a [employer] .     Family History: Patient did not provide    Allergies:  Allergies   Allergen Reactions   • " "Statins Swelling     Medications:  Prior to Admission medications    Medication Sig Start Date End Date Taking? Authorizing Provider   Ascorbic Acid (Vitamin C) 500 MG capsule Take 1 capsule by mouth.    David David MD   aspirin 81 MG chewable tablet Chew 1 tablet Daily.    David David MD   carvedilol (COREG) 25 MG tablet Take 1 tablet by mouth 2 (Two) Times a Day With Meals.    David David MD   clopidogrel (PLAVIX) 75 MG tablet Take 1 tablet by mouth Daily.    David David MD   diphenhydrAMINE (BENADRYL) 25 mg capsule Take 1 capsule by mouth Daily.    David David MD   insulin glargine (LANTUS, SEMGLEE) 100 UNIT/ML injection Inject 7 Units under the skin into the appropriate area as directed Daily.    David David MD   Insulin Lispro (humaLOG) 100 UNIT/ML injection Inject 6 Units under the skin into the appropriate area as directed Daily.    David David MD   lisinopril (PRINIVIL,ZESTRIL) 2.5 MG tablet Take 1 tablet by mouth 2 (Two) Times a Day.    David David MD   omeprazole (priLOSEC) 40 MG capsule Take 1 capsule by mouth Daily.    David David MD   vitamin D3 125 MCG (5000 UT) capsule capsule Take 1,000 Units by mouth Daily.    David David MD     I have utilized all available immediate resources to obtain, update, or review the patient's current medications (including all prescriptions, over-the-counter products, herbals, cannabis/cannabidiol products, and vitamin/mineral/dietary (nutritional) supplements).    Objective     Vital Signs: /54   Pulse 70   Temp 97.9 °F (36.6 °C) (Temporal)   Resp 14   Ht 160 cm (63\")   Wt 56.7 kg (125 lb)   SpO2 100%   BMI 22.14 kg/m²   Physical Exam  Vitals reviewed.   Constitutional:       General: She is not in acute distress.     Appearance: She is well-developed. She is not toxic-appearing.   HENT:      Head: Normocephalic and atraumatic.      Right Ear: External ear " normal.      Left Ear: External ear normal.      Mouth/Throat:      Mouth: Mucous membranes are dry.      Pharynx: Oropharynx is clear.   Eyes:      General:         Right eye: No discharge.         Left eye: No discharge.      Extraocular Movements: Extraocular movements intact.      Conjunctiva/sclera: Conjunctivae normal.      Pupils: Pupils are equal, round, and reactive to light.   Neck:      Vascular: No JVD.   Cardiovascular:      Rate and Rhythm: Normal rate and regular rhythm.      Pulses: Normal pulses.      Heart sounds: Normal heart sounds. No murmur heard.     No friction rub. No gallop.      Comments: AV fistula LUE with good bruit.   Pulmonary:      Effort: Pulmonary effort is normal. No respiratory distress.      Breath sounds: No stridor. No wheezing, rhonchi or rales.   Chest:      Chest wall: No tenderness.   Abdominal:      General: Bowel sounds are normal. There is no distension.      Palpations: Abdomen is soft.      Tenderness: There is no abdominal tenderness. There is no guarding or rebound.      Hernia: No hernia is present.   Musculoskeletal:         General: No swelling, tenderness or deformity. Normal range of motion.      Cervical back: Normal range of motion and neck supple. No rigidity or tenderness. No muscular tenderness.      Right lower leg: No edema.      Left lower leg: No edema.   Skin:     General: Skin is dry.      Findings: No erythema or rash.      Comments: Left lower extremity skin atrophic and taut, distal cyanosis with tenderness to palpation.    Neurological:      General: No focal deficit present.      Mental Status: She is alert and oriented to person, place, and time.      Cranial Nerves: No cranial nerve deficit.      Sensory: No sensory deficit.      Motor: No weakness or abnormal muscle tone.      Deep Tendon Reflexes: Reflexes normal.   Psychiatric:         Mood and Affect: Mood normal.         Behavior: Behavior normal.     Results Reviewed:  Lab Results (last  24 hours)       Procedure Component Value Units Date/Time    Blood Culture - Blood, Hand, Digit Right [[id number]] Collected: 11/15/23 1237    Specimen: Blood from Hand, Digit Right Updated: 11/15/23 1252    C-reactive Protein [[id number]]  (Abnormal) Collected: 11/15/23 1158    Specimen: Blood Updated: 11/15/23 1241     C-Reactive Protein 5.03 mg/dL     Comprehensive Metabolic Panel [[id number]]  (Abnormal) Collected: 11/15/23 1158    Specimen: Blood Updated: 11/15/23 1238     Glucose 139 mg/dL      BUN 49 mg/dL      Creatinine 4.31 mg/dL      Sodium 141 mmol/L      Potassium 4.2 mmol/L      Chloride 105 mmol/L      CO2 25.0 mmol/L      Calcium 9.5 mg/dL      Total Protein 7.0 g/dL      Albumin 3.8 g/dL      ALT (SGPT) 7 U/L      AST (SGOT) 12 U/L      Alkaline Phosphatase 130 U/L      Total Bilirubin 0.3 mg/dL      Globulin 3.2 gm/dL      A/G Ratio 1.2 g/dL      BUN/Creatinine Ratio 11.4     Anion Gap 11.0 mmol/L      eGFR 10.3 mL/min/1.73      Comment: <15 Indicative of kidney failure       Narrative:      GFR Normal >60  Chronic Kidney Disease <60  Kidney Failure <15    The GFR formula is only valid for adults with stable renal function between ages 18 and 70.    Lactic Acid, Plasma [[id number]]  (Normal) Collected: 11/15/23 1158    Specimen: Blood Updated: 11/15/23 1234     Lactate 1.9 mmol/L     Magnesium [[id number]]  (Normal) Collected: 11/15/23 1158    Specimen: Blood Updated: 11/15/23 1232     Magnesium 1.9 mg/dL     Sedimentation Rate [[id number]]  (Abnormal) Collected: 11/15/23 1158    Specimen: Blood Updated: 11/15/23 1231     Sed Rate 84 mm/hr     Protime-INR [[id number]]  (Abnormal) Collected: 11/15/23 1158    Specimen: Blood Updated: 11/15/23 1230     Protime 14.3 Seconds      INR 1.10    aPTT [[id number]]  (Normal) Collected: 11/15/23 1158    Specimen: Blood Updated: 11/15/23 1230     PTT 32.4 seconds     Blood Culture - Blood, Hand, Digit Right [[id number]] Collected: 11/15/23 1158    Specimen: Blood from  Hand, Digit Right Updated: 11/15/23 1217    CBC & Differential [[id number]]  (Abnormal) Collected: 11/15/23 1158    Specimen: Blood Updated: 11/15/23 1216    Narrative:      The following orders were created for panel order CBC & Differential.  Procedure                               Abnormality         Status                     ---------                               -----------         ------                     CBC Auto Differential[[id number]]        Abnormal            Final result                 Please view results for these tests on the individual orders.    CBC Auto Differential [[id number]]  (Abnormal) Collected: 11/15/23 1158    Specimen: Blood Updated: 11/15/23 1216     WBC 8.45 10*3/mm3      RBC 3.48 10*6/mm3      Hemoglobin 11.0 g/dL      Hematocrit 34.8 %      .0 fL      MCH 31.6 pg      MCHC 31.6 g/dL      RDW 13.9 %      RDW-SD 50.4 fl      MPV 11.0 fL      Platelets 247 10*3/mm3      Neutrophil % 76.7 %      Lymphocyte % 13.7 %      Monocyte % 7.3 %      Eosinophil % 1.2 %      Basophil % 0.6 %      Immature Grans % 0.5 %      Neutrophils, Absolute 6.48 10*3/mm3      Lymphocytes, Absolute 1.16 10*3/mm3      Monocytes, Absolute 0.62 10*3/mm3      Eosinophils, Absolute 0.10 10*3/mm3      Basophils, Absolute 0.05 10*3/mm3      Immature Grans, Absolute 0.04 10*3/mm3      nRBC 0.0 /100 WBC           Imaging Results (Last 24 Hours)       Procedure Component Value Units Date/Time    US Arterial Doppler Lower Extremity Complete [[id number]] Collected: 11/15/23 1616     Updated: 11/15/23 1621    Narrative:         History: PAD     Comments: Grayscale imaging as well as color flow duplex were used to  evaluate bilateral lower extremity arterial systems.     On the right, the peak systolic velocity in the common femoral artery  measured 240.6 cm/s. In the profunda femoris artery measured 200.7 cm/s.  The entire SFA and popliteal artery are occluded. The posterior tibial  artery is also occluded. In the  anterior tibial artery measured 63.3  cm/s.     On the left, the peak systolic velocity in the common femoral artery  measured 39.1 cm/s. In the profunda femoris artery measured 64 cm/s. In  the proximal SFA measured 21.2 cm/s. The mid to distal SFA and popliteal  artery are occluded. In the posterior tibial artery measured 13 cm/s. In  the anterior tibial artery measured 32.4 cm/s.       Impression:      Chronic bilateral SFA and popliteal artery occlusions. There  is reconstitution of flow in both tibial vessels in the lower  extremities.     This report was signed and finalized on 11/15/2023 4:18 PM CST by Dr. Kwame Villagomez MD.       XR Toe 2+ View Left [[id number]] Collected: 11/15/23 1306     Updated: 11/15/23 1311    Narrative:      EXAMINATION: XR TOE 2+ VW LEFT- 11/15/2023 1:06 PM CST     HISTORY: Concern for osteomyelitis left fourth toe from possible dry  gangrene.     REPORT: 3 views of the left toes were obtained.     COMPARISON: There are no correlative imaging studies for comparison.     There is diffuse osteopenia, no fracture is identified. No bony  destruction is identified and the joint spaces are preserved. There is  moderate overgrowth of the first metatarsal head with bunion formation.  Vascular calcifications are present.       Impression:      No acute osseous abnormality, no evidence of osteomyelitis.  There is diffuse osteopenia.     This report was signed and finalized on 11/15/2023 1:08 PM CST by Dr. Rafael Amor MD.             I have personally reviewed and interpreted the radiology studies and ECG obtained at time of admission.     Assessment / Plan   Assessment:   Active Hospital Problems    Diagnosis    • **Ischemic rest pain of lower extremity    • End stage renal disease on dialysis    • Type 2 diabetes mellitus, with long-term current use of insulin    • Primary hypertension    • Coronary artery disease involving native coronary artery of native heart without angina pectoris     • Chronic GERD      Treatment Plan  The patient will be admitted to my service here at [facility].   Admit to monitored bed  Vitals every 4 hours  Diet cardiac ADA when OK with vascular surgery  Up with assistance, fall precautions  Pain control > Tylenol 650 > Lortab 5 > Dilaudid 0.5 IV q4h prn  Heparin drip protocol D for renal failure  Vascular surgery consult    ESRD > Nephrology consult in place    DM > Hold Levemir  Check A1C, lipid panel  Humalog low dose sliding scale    HTN > Continue home medications Lisinopril    DVT prophylaxis > Patient currently fully anticoagulated    Medical Decision Making  Number and Complexity of problems: 4 complex medical problems  Differential Diagnosis: none    Conditions and Status        Condition is unchanged.     [facility] Data  External documents reviewed: [facility] EHR, care everywhere records  Cardiac tracing (EKG, telemetry) interpretation: EKG pending  Radiology interpretation: See above  Labs reviewed: See above  Any tests that were considered but not ordered: none     Decision rules/scores evaluated (example [id number], Wells, etc): N/A     Discussed with: Patient and brother at bedside     Care Planning  Shared decision making: Patient  Code status and discussions: Full code    Disposition  Social Determinants of Health that impact treatment or disposition: none  Estimated length of stay is over 2 midnights.     I confirmed that the patient's advanced care plan is present, code status is documented, and a surrogate decision maker is listed in the patient's medical record.     The patient's surrogate decision maker is family, see records.     The patient was seen and examined by me on 11/15/2023 at 1650.    Electronically signed by Salvatore Miller MD, 11/15/23, 16:50 CST.

## 2023-11-15 NOTE — CONSULTS
Nephrology ([facility]) Consult Note      Patient:  Harmony Montoya  YOB: 1950  Date of Service: 11/15/2023  MRN: [id number]   Acct: [id number]   Primary Care Physician: Provider, No Known  Advance Directive:   There are no questions and answers to display.     Admit Date: 11/15/2023       Hospital Day: 0  Referring Provider: No ref. provider found      Patient Seen, Chart, Consults, Notes, Labs, Radiology studies reviewed.      Subjective:  Harmony Montoya is a 73 y.o. female  whom we were consulted for end-stage renal disease management.  Patient presents to the emergency room complaining of severe pain in her feet mostly the left 1.  She is also describing some gangrenous changes to the fourth toe.  She has no fever or chills.  Her medical history is positive for hypertension, type 2 diabetes and end-stage renal disease.  She has been on chronic maintenance hemodialysis since 2022.  She has a left upper extremity arteriovenous fistula.  Patient received dialysis thrice weekly at the [facility] dialysis unit.  She has missed her dialysis today.  She offers no other complaints.    Allergies:  Statins    Home Meds:  (Not in a hospital admission)      Medicines:  No current facility-administered medications for this encounter.     Current Outpatient Medications   Medication Sig Dispense Refill    Ascorbic Acid (Vitamin C) 500 MG capsule Take 1 capsule by mouth.      aspirin 81 MG chewable tablet Chew 1 tablet Daily.      carvedilol (COREG) 25 MG tablet Take 1 tablet by mouth 2 (Two) Times a Day With Meals.      clopidogrel (PLAVIX) 75 MG tablet Take 1 tablet by mouth Daily.      diphenhydrAMINE (BENADRYL) 25 mg capsule Take 1 capsule by mouth Daily.      insulin glargine (LANTUS, SEMGLEE) 100 UNIT/ML injection Inject 7 Units under the skin into the appropriate area as directed Daily.      Insulin Lispro (humaLOG) 100 UNIT/ML injection Inject 6 Units under the skin into  "the appropriate area as directed Daily.      lisinopril (PRINIVIL,ZESTRIL) 2.5 MG tablet Take 1 tablet by mouth 2 (Two) Times a Day.      omeprazole (priLOSEC) 40 MG capsule Take 1 capsule by mouth Daily.      vitamin D3 125 MCG (5000 UT) capsule capsule Take 1,000 Units by mouth Daily.         Past Medical History:  History reviewed. No pertinent past medical history.    Past Surgical History:  History reviewed. No pertinent surgical history.    Family History  History reviewed. No pertinent family history.    Social History  Social History     Socioeconomic History    Marital status: Single   Tobacco Use    Smoking status: Former     Types: Cigarettes     Quit date: 2021     Years since quittin.5    Smokeless tobacco: Never   Vaping Use    Vaping Use: Never used   Substance and Sexual Activity    Alcohol use: Never    Drug use: Never         Review of Systems:  History obtained from chart review and the patient  General ROS: No fever or chills  Respiratory ROS: No cough, shortness of breath, wheezing  Cardiovascular ROS: no chest pain or dyspnea on exertion  Gastrointestinal ROS: No abdominal pain or melena  Genito-Urinary ROS: No dysuria or hematuria  14 point ROS reviewed with the patient and negative except as noted above and in the HPI unless unable to obtain.    Objective:  /54   Pulse 70   Temp 97.9 °F (36.6 °C) (Temporal)   Resp 14   Ht 160 cm (63\")   Wt 56.7 kg (125 lb)   SpO2 100%   BMI 22.14 kg/m²     Intake/Output Summary (Last 24 hours) at 11/15/2023 1657  Last data filed at 11/15/2023 1351  Gross per 24 hour   Intake 500 ml   Output --   Net 500 ml     General: awake/alert   Head: Atraumatic, normocephalic  Neck: No masses, no JVD  Chest:  clear to auscultation bilaterally without respiratory distress  CVS: regular rate and rhythm  Abdominal: soft, nontender, normal bowel sounds  Extremities: Trace leg edema  Skin: warm and dry without rash  Neuro: No focal motor " deficits  Musculoskeletal: No obvious joint effusions    Labs:  Lab Results (last 72 hours)       Procedure Component Value Units Date/Time    Blood Culture - Blood, Hand, Digit Right [[id number]] Collected: 11/15/23 1237    Specimen: Blood from Hand, Digit Right Updated: 11/15/23 1252    C-reactive Protein [[id number]]  (Abnormal) Collected: 11/15/23 1158    Specimen: Blood Updated: 11/15/23 1241     C-Reactive Protein 5.03 mg/dL     Comprehensive Metabolic Panel [[id number]]  (Abnormal) Collected: 11/15/23 1158    Specimen: Blood Updated: 11/15/23 1238     Glucose 139 mg/dL      BUN 49 mg/dL      Creatinine 4.31 mg/dL      Sodium 141 mmol/L      Potassium 4.2 mmol/L      Chloride 105 mmol/L      CO2 25.0 mmol/L      Calcium 9.5 mg/dL      Total Protein 7.0 g/dL      Albumin 3.8 g/dL      ALT (SGPT) 7 U/L      AST (SGOT) 12 U/L      Alkaline Phosphatase 130 U/L      Total Bilirubin 0.3 mg/dL      Globulin 3.2 gm/dL      A/G Ratio 1.2 g/dL      BUN/Creatinine Ratio 11.4     Anion Gap 11.0 mmol/L      eGFR 10.3 mL/min/1.73      Comment: <15 Indicative of kidney failure       Narrative:      GFR Normal >60  Chronic Kidney Disease <60  Kidney Failure <15    The GFR formula is only valid for adults with stable renal function between ages 18 and 70.    Lactic Acid, Plasma [[id number]]  (Normal) Collected: 11/15/23 1158    Specimen: Blood Updated: 11/15/23 1234     Lactate 1.9 mmol/L     Magnesium [[id number]]  (Normal) Collected: 11/15/23 1158    Specimen: Blood Updated: 11/15/23 1232     Magnesium 1.9 mg/dL     Sedimentation Rate [[id number]]  (Abnormal) Collected: 11/15/23 1158    Specimen: Blood Updated: 11/15/23 1231     Sed Rate 84 mm/hr     Protime-INR [[id number]]  (Abnormal) Collected: 11/15/23 1158    Specimen: Blood Updated: 11/15/23 1230     Protime 14.3 Seconds      INR 1.10    aPTT [[id number]]  (Normal) Collected: 11/15/23 1158    Specimen: Blood Updated: 11/15/23 1230     PTT 32.4 seconds     Blood Culture -  Blood, Hand, Digit Right [[id number]] Collected: 11/15/23 1158    Specimen: Blood from Hand, Digit Right Updated: 11/15/23 1217    CBC & Differential [[id number]]  (Abnormal) Collected: 11/15/23 1158    Specimen: Blood Updated: 11/15/23 1216    Narrative:      The following orders were created for panel order CBC & Differential.  Procedure                               Abnormality         Status                     ---------                               -----------         ------                     CBC Auto Differential[[id number]]        Abnormal            Final result                 Please view results for these tests on the individual orders.    CBC Auto Differential [[id number]]  (Abnormal) Collected: 11/15/23 1158    Specimen: Blood Updated: 11/15/23 1216     WBC 8.45 10*3/mm3      RBC 3.48 10*6/mm3      Hemoglobin 11.0 g/dL      Hematocrit 34.8 %      .0 fL      MCH 31.6 pg      MCHC 31.6 g/dL      RDW 13.9 %      RDW-SD 50.4 fl      MPV 11.0 fL      Platelets 247 10*3/mm3      Neutrophil % 76.7 %      Lymphocyte % 13.7 %      Monocyte % 7.3 %      Eosinophil % 1.2 %      Basophil % 0.6 %      Immature Grans % 0.5 %      Neutrophils, Absolute 6.48 10*3/mm3      Lymphocytes, Absolute 1.16 10*3/mm3      Monocytes, Absolute 0.62 10*3/mm3      Eosinophils, Absolute 0.10 10*3/mm3      Basophils, Absolute 0.05 10*3/mm3      Immature Grans, Absolute 0.04 10*3/mm3      nRBC 0.0 /100 WBC             Radiology:   Imaging Results (Last 72 Hours)       Procedure Component Value Units Date/Time    US Arterial Doppler Lower Extremity Complete [[id number]] Collected: 11/15/23 1616     Updated: 11/15/23 1621    Narrative:         History: PAD     Comments: Grayscale imaging as well as color flow duplex were used to  evaluate bilateral lower extremity arterial systems.     On the right, the peak systolic velocity in the common femoral artery  measured 240.6 cm/s. In the profunda femoris artery measured 200.7 cm/s.  The  "entire SFA and popliteal artery are occluded. The posterior tibial  artery is also occluded. In the anterior tibial artery measured 63.3  cm/s.     On the left, the peak systolic velocity in the common femoral artery  measured 39.1 cm/s. In the profunda femoris artery measured 64 cm/s. In  the proximal SFA measured 21.2 cm/s. The mid to distal SFA and popliteal  artery are occluded. In the posterior tibial artery measured 13 cm/s. In  the anterior tibial artery measured 32.4 cm/s.       Impression:      Chronic bilateral SFA and popliteal artery occlusions. There  is reconstitution of flow in both tibial vessels in the lower  extremities.     This report was signed and finalized on 11/15/2023 4:18 PM CST by Dr. Kwame Villagomez MD.       XR Toe 2+ View Left [[id number]] Collected: 11/15/23 1306     Updated: 11/15/23 1311    Narrative:      EXAMINATION: XR TOE 2+ VW LEFT- 11/15/2023 1:06 PM CST     HISTORY: Concern for osteomyelitis left fourth toe from possible dry  gangrene.     REPORT: 3 views of the left toes were obtained.     COMPARISON: There are no correlative imaging studies for comparison.     There is diffuse osteopenia, no fracture is identified. No bony  destruction is identified and the joint spaces are preserved. There is  moderate overgrowth of the first metatarsal head with bunion formation.  Vascular calcifications are present.       Impression:      No acute osseous abnormality, no evidence of osteomyelitis.  There is diffuse osteopenia.     This report was signed and finalized on 11/15/2023 1:08 PM CST by Dr. Rafael Amor MD.               Culture:  No components found for: \"WOUNDCUL\", \"3\"  No components found for: \"CSFCUL\", \"3\"  No components found for: \"BC\", \"3\"  No components found for: \"URINECUL\", \"3\"      Assessment   -End-stage renal disease  -Type 2 diabetes with renal disease  -Hypertension  -Peripheral vascular disease  -Ischemic changes to lower extremities  -Anemia of chronic kidney " disease    Plan:  Patient was started on intravenous heparin.  She will be seen by vascular surgery.  We will continue dialysis thrice weekly.  We will schedule dialysis in a.m. as they make treatment for today.  Follow-up labs.      Thank you for the consult, we appreciate the opportunity to provide care to your patients.  Feel free to contact me if I can be of any further assistance.      Michael Wright MD  11/15/2023  16:57 CST

## 2023-11-15 NOTE — ED PROVIDER NOTES
"HPI:    73-year-old -American female who presents to the emergency room with a complaint of having left greater than right foot pain.  Patient states that for the past month or 2 she has had problems with pain in her left foot.  Patient states that she has [facility] and she never got the results of the ultrasounds of her lower extremities.  Patient states over the past week to her fourth toe on the left foot has become black.  Patient states that from her knee down to her foot on the left greater than the right the skin is changed and feels really tough and is very painful to bear weight especially on the left foot.  Some friends of hers told her this may be a \"vascular problem\" and told her she should come to the emergency room to see a vascular surgeon.  Patient rates her pain 10 out of 10 and states that she has had no fevers chills or night sweats no nausea vomiting diarrhea.        REVIEW OF SYSTEMS  CONSTITUTIONAL:  No complaints of fever, chills,or weakness  EYES:  No complaints of discharge   ENT: No complaints of sore throat or ear pain  CARDIOVASCULAR:  No complaints of chest pain, palpitations, or swelling  RESPIRATORY:  No complaints of cough or shortness of breath  GI:  No complaints of abdominal pain, nausea, vomiting, or diarrhea  MUSCULOSKELETAL: Positive for bilateral lower extremity pain greatest in the left leg from knee to foot than the right   SKIN: Positive for darkened skin and necrotic appearing left fourth toe  NEUROLOGIC:  No complaints of headache, focal weakness, or sensory changes  ENDOCRINE:  No complaints of polyuria or polydipsia  LYMPHATIC:  No complaints of swollen glands  GENITOURINARY: No complaints of urinary frequency or hematuria        PAST MEDICAL HISTORY  History reviewed. No pertinent past medical history.    FAMILY HISTORY  History reviewed. No pertinent family history.    SOCIAL " "HISTORY  Social History     Socioeconomic History    Marital status: Single   Tobacco Use    Smoking status: Former     Types: Cigarettes     Quit date: 2021     Years since quittin.5    Smokeless tobacco: Never   Vaping Use    Vaping Use: Never used   Substance and Sexual Activity    Alcohol use: Never    Drug use: Never       IMMUNIZATION HISTORY  Deferred to primary care physician.    SURGICAL HISTORY  History reviewed. No pertinent surgical history.    CURRENT MEDICATIONS  No current facility-administered medications for this encounter.    Current Outpatient Medications:     Ascorbic Acid (Vitamin C) 500 MG capsule, Take 1 capsule by mouth., Disp: , Rfl:     aspirin 81 MG chewable tablet, Chew 1 tablet Daily., Disp: , Rfl:     carvedilol (COREG) 25 MG tablet, Take 1 tablet by mouth 2 (Two) Times a Day With Meals., Disp: , Rfl:     clopidogrel (PLAVIX) 75 MG tablet, Take 1 tablet by mouth Daily., Disp: , Rfl:     diphenhydrAMINE (BENADRYL) 25 mg capsule, Take 1 capsule by mouth Daily., Disp: , Rfl:     insulin glargine (LANTUS, SEMGLEE) 100 UNIT/ML injection, Inject 7 Units under the skin into the appropriate area as directed Daily., Disp: , Rfl:     Insulin Lispro (humaLOG) 100 UNIT/ML injection, Inject 6 Units under the skin into the appropriate area as directed Daily., Disp: , Rfl:     lisinopril (PRINIVIL,ZESTRIL) 2.5 MG tablet, Take 1 tablet by mouth 2 (Two) Times a Day., Disp: , Rfl:     omeprazole (priLOSEC) 40 MG capsule, Take 1 capsule by mouth Daily., Disp: , Rfl:     vitamin D3 125 MCG (5000 UT) capsule capsule, Take 1,000 Units by mouth Daily., Disp: , Rfl:     ALLERGIES  Allergies   Allergen Reactions    Statins Swelling       Musculoskeletal exam    VITAL SIGNS:   /54   Pulse 70   Temp 97.9 °F (36.6 °C) (Temporal)   Resp 14   Ht 160 cm (63\")   Wt 56.7 kg (125 lb)   SpO2 100%   BMI 22.14 kg/m²     Constitutional: Patient is alert and in no distress.  Patient with severe left foot " and fourth toe pain and moderate right foot discomfort.    ENT: There is a normal pharynx with no acute erythema or exudate and oral mucosa is moist.  Nose is clear with no drainage.  Tympanic membranes intact and non-erythemic    Cardiovascular: S1-S2 regular rate and rhythm no murmurs rubs or gallops is noted.  Difficult to get dorsalis pedis and posterior tibial pulses on the left lower extremity.  Right lower extremity below the calf is very weak but present.  Cap refill of the toes is greatly delayed and almost nonexistent on the left when compared to the right which is delayed but present.    Respiratory: Patient is clear to auscultation bilaterally with no wheezing or rhonchi.  Chest wall is nontender.  There is no external lesions on the chest.  There is no crepitance    Abdomen: Soft nontender bowel sounds are normal in all 4 quadrants there is no rebound or guarding noted.  There is no abdominal distention or hepatosplenomegaly.    Musculoskeletal: Left foot and lower leg: Woody appearance of the skin with thick texture of the skin.  There is decreased circulation appearing in this leg with a necrotic appearing left fourth toe.  Severe pain with palpation over the distal calf leg and foot.    Integumentary: No acute changes noted    Genitourinary: Patient is voiding appropriately.    Psychiatric: Normal affect and mood      RADIOLOGY/PROCEDURES        XR Toe 2+ View Left   Final Result   No acute osseous abnormality, no evidence of osteomyelitis.   There is diffuse osteopenia.       This report was signed and finalized on 11/15/2023 1:08 PM CST by Dr. Rafael Amor MD.          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 There   is reconstitution of flow in both tibial vessels in the lower   extremities.       This report was signed and finalized on 11/15/2023 4:18 PM CST by Dr. Kwame Villagomez MD.                 FUTURE  APPOINTMENTS     No future appointments.         COURSE & MEDICAL DECISION MAKING    Patient's partial differential diagnosis can include:  Arterial occlusion, osteomyelitis, dry gangrene, and others    Discussed case with vascular surgeon Dr. Villagomez who feels that the patient will likely need bilateral lower extremity amputation.  He believes this is not emergent but does recommend that the patient could be admitted for pain control and placed on heparin drip.  Which we will follow his instructions.    Discussed case with nephrologist Dr. Wright who is aware of the patient needing dialysis due to her missing dialysis on Monday and is supposed to have dialysis today.  He will put her on his dialysis caseload.  Now we will call the hospitalist for admission.    Discussed case with hospitalist Dr. Miller who will accept the patient to his medical service.  Dr. Villagomez will be consulted.  Dr. Wright is also aware of the patient's kidney disease and need for dialysis.    Patient's level of risk: High      CRITICAL CARE    CRITICAL CARE: No    CRITICAL CARE TIME: None      Recent Results (from the past 24 hour(s))   Comprehensive Metabolic Panel    Collection Time: 11/15/23 11:58 AM    Specimen: Blood   Result Value Ref Range    Glucose 139 (H) 65 - 99 mg/dL    BUN 49 (H) 8 - 23 mg/dL    Creatinine 4.31 (H) 0.57 - 1.00 mg/dL    Sodium 141 136 - 145 mmol/L    Potassium 4.2 3.5 - 5.2 mmol/L    Chloride 105 98 - 107 mmol/L    CO2 25.0 22.0 - 29.0 mmol/L    Calcium 9.5 8.6 - 10.5 mg/dL    Total Protein 7.0 6.0 - 8.5 g/dL    Albumin 3.8 3.5 - 5.2 g/dL    ALT (SGPT) 7 1 - 33 U/L    AST (SGOT) 12 1 - 32 U/L    Alkaline Phosphatase 130 (H) 39 - 117 U/L    Total Bilirubin 0.3 0.0 - 1.2 mg/dL    Globulin 3.2 gm/dL    A/G Ratio 1.2 g/dL    BUN/Creatinine Ratio 11.4 7.0 - 25.0    Anion Gap 11.0 5.0 - 15.0 mmol/L    eGFR 10.3 (L) >60.0 mL/min/1.73   Magnesium    Collection Time: 11/15/23 11:58 AM    Specimen: Blood   Result  Value Ref Range    Magnesium 1.9 1.6 - 2.4 mg/dL   Protime-INR    Collection Time: 11/15/23 11:58 AM    Specimen: Blood   Result Value Ref Range    Protime 14.3 11.8 - 14.8 Seconds    INR 1.10 (H) 0.91 - 1.09   aPTT    Collection Time: 11/15/23 11:58 AM    Specimen: Blood   Result Value Ref Range    PTT 32.4 24.5 - 36.0 seconds   Lactic Acid, Plasma    Collection Time: 11/15/23 11:58 AM    Specimen: Blood   Result Value Ref Range    Lactate 1.9 0.5 - 2.0 mmol/L   C-reactive Protein    Collection Time: 11/15/23 11:58 AM    Specimen: Blood   Result Value Ref Range    C-Reactive Protein 5.03 (H) 0.00 - 0.50 mg/dL   Sedimentation Rate    Collection Time: 11/15/23 11:58 AM    Specimen: Blood   Result Value Ref Range    Sed Rate 84 (H) 0 - 30 mm/hr   CBC Auto Differential    Collection Time: 11/15/23 11:58 AM    Specimen: Blood   Result Value Ref Range    WBC 8.45 3.40 - 10.80 10*3/mm3    RBC 3.48 (L) 3.77 - 5.28 10*6/mm3    Hemoglobin 11.0 (L) 12.0 - 15.9 g/dL    Hematocrit 34.8 34.0 - 46.6 %    .0 (H) 79.0 - 97.0 fL    MCH 31.6 26.6 - 33.0 pg    MCHC 31.6 31.5 - 35.7 g/dL    RDW 13.9 12.3 - 15.4 %    RDW-SD 50.4 37.0 - 54.0 fl    MPV 11.0 6.0 - 12.0 fL    Platelets 247 140 - 450 10*3/mm3    Neutrophil % 76.7 (H) 42.7 - 76.0 %    Lymphocyte % 13.7 (L) 19.6 - 45.3 %    Monocyte % 7.3 5.0 - 12.0 %    Eosinophil % 1.2 0.3 - 6.2 %    Basophil % 0.6 0.0 - 1.5 %    Immature Grans % 0.5 0.0 - 0.5 %    Neutrophils, Absolute 6.48 1.70 - 7.00 10*3/mm3    Lymphocytes, Absolute 1.16 0.70 - 3.10 10*3/mm3    Monocytes, Absolute 0.62 0.10 - 0.90 10*3/mm3    Eosinophils, Absolute 0.10 0.00 - 0.40 10*3/mm3    Basophils, Absolute 0.05 0.00 - 0.20 10*3/mm3    Immature Grans, Absolute 0.04 0.00 - 0.05 10*3/mm3    nRBC 0.0 0.0 - 0.2 /100 WBC           the last primary care visit was reviewed by me in the EPIC electronic medical record.      Also  Old charts were reviewed per [facility] EMR.  Pertinent details are summarized above.  All  laboratory, radiologic, and EKG studies that were performed in the Emergency Department were a necessary part of the evaluation needed to exclude unstable or  emergent medical conditions.     Patient was hemodynamically and neurologically stable in the ED.   Pertinent studies were reviewed as above.     The patient received:  Medications   Morphine sulfate (PF) injection 2 mg (2 mg Intravenous Given 11/15/23 1159)   ondansetron (ZOFRAN) injection 4 mg (4 mg Intravenous Given 11/15/23 1159)   sodium chloride 0.9 % bolus 500 mL (0 mL Intravenous Stopped 11/15/23 1351)   HYDROmorphone (DILAUDID) injection 1 mg (1 mg Intravenous Given 11/15/23 5914)            ED Disposition       ED Disposition   Decision to Admit    Condition   --    Comment   --                 Dragon disclaimer:  Part of this note may be an electronic transcription/translation of spoken language to printed text using the Dragon Dictation System.     I have reviewed the patient’s prescription history via a prescription monitoring program.  This information is consistent with my knowledge of the patient’s controlled substance use history.    Patient evaluate during Coronavirus Pandemic. Isolation practices followed according to [facility] policy.    FINAL IMPRESSION   Diagnosis Plan   1. End stage renal disease on dialysis        2. Arterial occlusion, lower extremity        3. Intractable pain              MD Shyam Wheat Jr, Thomas Mark Jr., MD  11/15/23 7364

## 2023-11-16 LAB
ABO GROUP BLD: NORMAL
ACANTHOCYTES BLD QL SMEAR: ABNORMAL
ANION GAP SERPL CALCULATED.3IONS-SCNC: 12 MMOL/L (ref 5–15)
ANISOCYTOSIS BLD QL: ABNORMAL
APTT PPP: 126.7 SECONDS (ref 24.5–36)
APTT PPP: 37.2 SECONDS (ref 24.5–36)
APTT PPP: 57.6 SECONDS (ref 24.5–36)
APTT PPP: 61.3 SECONDS (ref 24.5–36)
APTT PPP: 65.8 SECONDS (ref 24.5–36)
BASOPHILS # BLD MANUAL: 0.13 10*3/MM3 (ref 0–0.2)
BASOPHILS NFR BLD MANUAL: 2 % (ref 0–1.5)
BLD GP AB SCN SERPL QL: NEGATIVE
BUN SERPL-MCNC: 51 MG/DL (ref 8–23)
BUN/CREAT SERPL: 11.4 (ref 7–25)
BURR CELLS BLD QL SMEAR: ABNORMAL
CALCIUM SPEC-SCNC: 9 MG/DL (ref 8.6–10.5)
CHLORIDE SERPL-SCNC: 106 MMOL/L (ref 98–107)
CO2 SERPL-SCNC: 24 MMOL/L (ref 22–29)
CREAT SERPL-MCNC: 4.46 MG/DL (ref 0.57–1)
DEPRECATED RDW RBC AUTO: 51.8 FL (ref 37–54)
EGFRCR SERPLBLD CKD-EPI 2021: 9.9 ML/MIN/1.73
EOSINOPHIL # BLD MANUAL: 0.13 10*3/MM3 (ref 0–0.4)
EOSINOPHIL NFR BLD MANUAL: 2 % (ref 0.3–6.2)
ERYTHROCYTE [DISTWIDTH] IN BLOOD BY AUTOMATED COUNT: 13.9 % (ref 12.3–15.4)
GLUCOSE SERPL-MCNC: 154 MG/DL (ref 65–99)
HCT VFR BLD AUTO: 31.8 % (ref 34–46.6)
HGB BLD-MCNC: 9.7 G/DL (ref 12–15.9)
LYMPHOCYTES # BLD MANUAL: 1.12 10*3/MM3 (ref 0.7–3.1)
LYMPHOCYTES NFR BLD MANUAL: 10 % (ref 5–12)
MACROCYTES BLD QL SMEAR: ABNORMAL
MCH RBC QN AUTO: 31.3 PG (ref 26.6–33)
MCHC RBC AUTO-ENTMCNC: 30.5 G/DL (ref 31.5–35.7)
MCV RBC AUTO: 102.6 FL (ref 79–97)
MONOCYTES # BLD: 0.66 10*3/MM3 (ref 0.1–0.9)
NEUTROPHILS # BLD AUTO: 4.56 10*3/MM3 (ref 1.7–7)
NEUTROPHILS NFR BLD MANUAL: 69 % (ref 42.7–76)
PLAT MORPH BLD: NORMAL
PLATELET # BLD AUTO: 217 10*3/MM3 (ref 140–450)
PMV BLD AUTO: 10.7 FL (ref 6–12)
POIKILOCYTOSIS BLD QL SMEAR: ABNORMAL
POTASSIUM SERPL-SCNC: 4.7 MMOL/L (ref 3.5–5.2)
RBC # BLD AUTO: 3.1 10*6/MM3 (ref 3.77–5.28)
RH BLD: POSITIVE
SODIUM SERPL-SCNC: 142 MMOL/L (ref 136–145)
T&S EXPIRATION DATE: NORMAL
VARIANT LYMPHS NFR BLD MANUAL: 14 % (ref 19.6–45.3)
VARIANT LYMPHS NFR BLD MANUAL: 3 % (ref 0–5)
WBC MORPH BLD: NORMAL
WBC NRBC COR # BLD: 6.61 10*3/MM3 (ref 3.4–10.8)

## 2023-11-16 PROCEDURE — 80048 BASIC METABOLIC PNL TOTAL CA: CPT | Performed by: FAMILY MEDICINE

## 2023-11-16 PROCEDURE — 36415 COLL VENOUS BLD VENIPUNCTURE: CPT | Performed by: FAMILY MEDICINE

## 2023-11-16 PROCEDURE — 85730 THROMBOPLASTIN TIME PARTIAL: CPT | Performed by: FAMILY MEDICINE

## 2023-11-16 PROCEDURE — G0378 HOSPITAL OBSERVATION PER HR: HCPCS

## 2023-11-16 PROCEDURE — 25010000002 HYDROMORPHONE PER 4 MG: Performed by: FAMILY MEDICINE

## 2023-11-16 PROCEDURE — 86901 BLOOD TYPING SEROLOGIC RH(D): CPT | Performed by: NURSE PRACTITIONER

## 2023-11-16 PROCEDURE — 85007 BL SMEAR W/DIFF WBC COUNT: CPT | Performed by: FAMILY MEDICINE

## 2023-11-16 PROCEDURE — 25010000002 LORAZEPAM PER 2 MG: Performed by: FAMILY MEDICINE

## 2023-11-16 PROCEDURE — 85025 COMPLETE CBC W/AUTO DIFF WBC: CPT | Performed by: FAMILY MEDICINE

## 2023-11-16 PROCEDURE — 86900 BLOOD TYPING SEROLOGIC ABO: CPT | Performed by: NURSE PRACTITIONER

## 2023-11-16 PROCEDURE — 25010000002 HEPARIN (PORCINE) PER 1000 UNITS: Performed by: FAMILY MEDICINE

## 2023-11-16 PROCEDURE — 86850 RBC ANTIBODY SCREEN: CPT | Performed by: NURSE PRACTITIONER

## 2023-11-16 RX ORDER — LORAZEPAM 2 MG/ML
1 INJECTION INTRAMUSCULAR ONCE
Status: COMPLETED | OUTPATIENT
Start: 2023-11-16 | End: 2023-11-16

## 2023-11-16 RX ADMIN — DOCUSATE SODIUM 50 MG AND SENNOSIDES 8.6 MG 2 TABLET: 8.6; 5 TABLET, FILM COATED ORAL at 08:12

## 2023-11-16 RX ADMIN — LORAZEPAM 1 MG: 2 INJECTION INTRAMUSCULAR; INTRAVENOUS at 15:55

## 2023-11-16 RX ADMIN — HYDROMORPHONE HYDROCHLORIDE 0.5 MG: 1 INJECTION, SOLUTION INTRAMUSCULAR; INTRAVENOUS; SUBCUTANEOUS at 10:27

## 2023-11-16 RX ADMIN — HEPARIN SODIUM 3400 UNITS: 1000 INJECTION, SOLUTION INTRAVENOUS; SUBCUTANEOUS at 08:14

## 2023-11-16 RX ADMIN — HYDROCODONE BITARTRATE AND ACETAMINOPHEN 1 TABLET: 5; 325 TABLET ORAL at 18:27

## 2023-11-16 RX ADMIN — HEPARIN SODIUM 1200 UNITS: 1000 INJECTION, SOLUTION INTRAVENOUS; SUBCUTANEOUS at 21:03

## 2023-11-16 RX ADMIN — HYDROCODONE BITARTRATE AND ACETAMINOPHEN 1 TABLET: 5; 325 TABLET ORAL at 06:24

## 2023-11-16 RX ADMIN — Medication 10 ML: at 08:12

## 2023-11-16 RX ADMIN — ASPIRIN 81 MG: 81 TABLET, CHEWABLE ORAL at 08:12

## 2023-11-16 RX ADMIN — PANTOPRAZOLE SODIUM 40 MG: 40 TABLET, DELAYED RELEASE ORAL at 05:53

## 2023-11-16 RX ADMIN — Medication 10 ML: at 20:59

## 2023-11-16 RX ADMIN — LISINOPRIL 2.5 MG: 2.5 TABLET ORAL at 20:59

## 2023-11-16 RX ADMIN — DOCUSATE SODIUM 50 MG AND SENNOSIDES 8.6 MG 2 TABLET: 8.6; 5 TABLET, FILM COATED ORAL at 20:59

## 2023-11-16 RX ADMIN — LISINOPRIL 2.5 MG: 2.5 TABLET ORAL at 08:12

## 2023-11-16 RX ADMIN — ACETAMINOPHEN 650 MG: 325 TABLET, FILM COATED ORAL at 03:10

## 2023-11-16 NOTE — CASE MANAGEMENT/SOCIAL WORK
Discharge Planning Assessment   [place]     Patient Name: Harmony Montoya  MRN: [id number]  Today's Date: 11/16/2023    Admit Date: 11/15/2023        Discharge Needs Assessment       Row Name 11/16/23 1045       Living Environment    People in Home sibling(s)    Name(s) of People in Home Andre Montoya - younger brother    Current Living Arrangements home    Potentially Unsafe Housing Conditions none    In the past 12 months has the electric, gas, oil, or water company threatened to shut off services in your home? No    Primary Care Provided by self    Provides Primary Care For no one    Family Caregiver if Needed sibling(s)    Quality of Family Relationships helpful;involved    Able to Return to Prior Arrangements yes       Resource/Environmental Concerns    Resource/Environmental Concerns none    Transportation Concerns none       Food Insecurity    Within the past 12 months, you worried that your food would run out before you got the money to buy more. Never true    Within the past 12 months, the food you bought just didn't last and you didn't have money to get more. Never true       Transition Planning    Patient/Family Anticipates Transition to home with family    Transportation Anticipated family or friend will provide;public transportation;health plan transportation       Discharge Needs Assessment    Readmission Within the Last 30 Days no previous admission in last 30 days    Equipment Currently Used at Home cane, straight    Concerns to be Addressed discharge planning;denies needs/concerns at this time    Anticipated Changes Related to Illness none    Current Discharge Risk chronically ill    Discharge Coordination/Progress Pt reports that her younger brother Andre lives with her, yet he is not listed under next of kin unless other name.  Spoke with pt down in HD.  States she is independent at home and still drives, only uses a cane occasionally.  Has PCP and RX coverage and anticipates DC back home when  feeling better.  Currently denies needs will continue to follow for pt is an established HD pt.                   Discharge Plan    No documentation.                 Continued Care and Services - Admitted Since 11/15/2023    Coordination has not been started for this encounter.          Demographic Summary    No documentation.                  Functional Status    No documentation.                  Psychosocial    No documentation.                  Abuse/Neglect    No documentation.                  Legal    No documentation.                  Substance Abuse    No documentation.                  Patient Forms    No documentation.                     Brenna Alegre RN

## 2023-11-16 NOTE — PAYOR COMM NOTE
"Clotilde Villa (73 y.o. Female)    [id number]   admit 11/15   [facility] phone     Fax           Date of Birth   1950    Social Security Number       Address   [address] [place] [address]    Home Phone   [phone]    MRN   [id number]       Advent   Worship    Marital Status   Single                            Admission Date   11/15/23    Admission Type   Emergency    Admitting Provider   Salvatore Miller MD    Attending Provider   Salvatore Miller MD    Department, Room/Bed   [facility] 3C, 379/1       Discharge Date       Discharge Disposition       Discharge Destination                                 Attending Provider: Salvatore Miller MD    Allergies: Statins    Isolation: None   Infection: None   Code Status: CPR    Ht: 160 cm (63\")   Wt: 56.7 kg (125 lb)    Admission Cmt: None   Principal Problem: Ischemic rest pain of lower extremity [M79.606,I99.8]                   Active Insurance as of 11/15/2023       Primary Coverage       Payor Plan Insurance Group Employer/Plan Group    [facility] DEPT [id number]        Payor Plan Address Payor Plan Phone Number Payor Plan Fax Number Effective Dates    [facility] OFFICE OF COMMUNITY CARE [phone]  1/1/2023 - None Entered    [address] [address]       [facility] [address]         Subscriber Name Subscriber Birth Date Member ID       CLOTILDE VILLA 1950 [id number]               Secondary Coverage       Payor Plan Insurance Group Employer/Plan Group    [facility] CCN OPTUM        Payor Plan Address Payor Plan Phone Number Payor Plan Fax Number Effective Dates    [address] [phone]  1/1/2023 - None Entered    [facility] [id number]         Subscriber Name Subscriber Birth Date Member ID       CLOTILDE VILLA 1950 [id number]               Tertiary Coverage       Payor Plan Insurance Group Employer/Plan Group    MEDICARE MEDICARE A ONLY        " "Payor Plan Address Payor Plan Phone Number Payor Plan Fax Number Effective Dates    [address] [phone]  2/1/2015 - None Entered    [place] [id number]         Subscriber Name Subscriber Birth Date Member ID       CLOTILDE MONTOYA 1950 [id number]                     Emergency Contacts        (Rel.) Home Phone Work Phone Mobile Phone    Enrique Montoya (Brother) [phone] -- [phone]    Reuben Montoya (Brother) -- -- [phone]    Magaly Montoya (Relative) [phone] -- [phone]                 History & Physical        Salvatore Miller MD at 11/15/23 1650              [facility]  HISTORY AND PHYSICAL    Date of Admission: 11/15/2023  Primary Care Physician: Provider, No Known    Subjective   Primary Historian: Patient    Chief Complaint: Left leg pain    History of Present Illness  73 year old female with PMH of ESRD, DM 2 ID, CAD, HTN, that presents to the ER with complaints of pain in left leg and inability to walk. She has had pain for about 5 weeks and has required help to walk due to pain. Yesterday she visited with Dr Juan Miguel Iraheta, general surgeon who wrote: \"I cannot feel for pulse due to severe pain of the foot. She has had no vascular studies done but highly suspect significant peripheral vascular disease. I recommend that she go to [place] or [place] ER where there is vascular intervention capabilities soon as possible. \"    She was evaluated in the ER with US arterial doppler LE that is reported as: Chronic bilateral SFA and popliteal artery occlusions. There is reconstitution of flow in both tibial vessels in the lower   Extremities.    Dr Villagomez was consulted and is recommending admission to medical services, anticoagulation and possible revascularization versus amputation.     Review of Systems   Otherwise complete ROS reviewed and negative except as mentioned in the HPI.    Past Medical History:   DM 2 " ID  HTN  CAD  ESRD  GERD  AOCD    Past Surgical History:  07/13/2023 REMOVAL OF INTRAPERITONEAL CANNULA OR CATHETER (SURG)   completed   04/20/2023 LAPAROSCOPY WITH INSERTION OF INTRAPERITONEAL CANNULA OR CATHETER (SURG)   completed     Av fusion direct any site   completed     Social History:  reports that she quit smoking about 2 years ago. Her smoking use included cigarettes. She has never used smokeless tobacco. She reports that she does not drink alcohol and does not use drugs. She is a Jehovah's witness .     Family History: Patient did not provide    Allergies:  Allergies   Allergen Reactions    Statins Swelling     Medications:  Prior to Admission medications    Medication Sig Start Date End Date Taking? Authorizing Provider   Ascorbic Acid (Vitamin C) 500 MG capsule Take 1 capsule by mouth.    David David MD   aspirin 81 MG chewable tablet Chew 1 tablet Daily.    David David MD   carvedilol (COREG) 25 MG tablet Take 1 tablet by mouth 2 (Two) Times a Day With Meals.    David David MD   clopidogrel (PLAVIX) 75 MG tablet Take 1 tablet by mouth Daily.    David David MD   diphenhydrAMINE (BENADRYL) 25 mg capsule Take 1 capsule by mouth Daily.    David David MD   insulin glargine (LANTUS, SEMGLEE) 100 UNIT/ML injection Inject 7 Units under the skin into the appropriate area as directed Daily.    David David MD   Insulin Lispro (humaLOG) 100 UNIT/ML injection Inject 6 Units under the skin into the appropriate area as directed Daily.    David David MD   lisinopril (PRINIVIL,ZESTRIL) 2.5 MG tablet Take 1 tablet by mouth 2 (Two) Times a Day.    David David MD   omeprazole (priLOSEC) 40 MG capsule Take 1 capsule by mouth Daily.    David David MD   vitamin D3 125 MCG (5000 UT) capsule capsule Take 1,000 Units by mouth Daily.    David David MD     I have utilized all available immediate resources to obtain, update, or review  "the patient's current medications (including all prescriptions, over-the-counter products, herbals, cannabis/cannabidiol products, and vitamin/mineral/dietary (nutritional) supplements).    Objective     Vital Signs: /54   Pulse 70   Temp 97.9 °F (36.6 °C) (Temporal)   Resp 14   Ht 160 cm (63\")   Wt 56.7 kg (125 lb)   SpO2 100%   BMI 22.14 kg/m²   Physical Exam  Vitals reviewed.   Constitutional:       General: She is not in acute distress.     Appearance: She is well-developed. She is not toxic-appearing.   HENT:      Head: Normocephalic and atraumatic.      Right Ear: External ear normal.      Left Ear: External ear normal.      Mouth/Throat:      Mouth: Mucous membranes are dry.      Pharynx: Oropharynx is clear.   Eyes:      General:         Right eye: No discharge.         Left eye: No discharge.      Extraocular Movements: Extraocular movements intact.      Conjunctiva/sclera: Conjunctivae normal.      Pupils: Pupils are equal, round, and reactive to light.   Neck:      Vascular: No JVD.   Cardiovascular:      Rate and Rhythm: Normal rate and regular rhythm.      Pulses: Normal pulses.      Heart sounds: Normal heart sounds. No murmur heard.     No friction rub. No gallop.      Comments: AV fistula LUE with good bruit.   Pulmonary:      Effort: Pulmonary effort is normal. No respiratory distress.      Breath sounds: No stridor. No wheezing, rhonchi or rales.   Chest:      Chest wall: No tenderness.   Abdominal:      General: Bowel sounds are normal. There is no distension.      Palpations: Abdomen is soft.      Tenderness: There is no abdominal tenderness. There is no guarding or rebound.      Hernia: No hernia is present.   Musculoskeletal:         General: No swelling, tenderness or deformity. Normal range of motion.      Cervical back: Normal range of motion and neck supple. No rigidity or tenderness. No muscular tenderness.      Right lower leg: No edema.      Left lower leg: No edema. "   Skin:     General: Skin is dry.      Findings: No erythema or rash.      Comments: Left lower extremity skin atrophic and taut, distal cyanosis with tenderness to palpation.    Neurological:      General: No focal deficit present.      Mental Status: She is alert and oriented to person, place, and time.      Cranial Nerves: No cranial nerve deficit.      Sensory: No sensory deficit.      Motor: No weakness or abnormal muscle tone.      Deep Tendon Reflexes: Reflexes normal.   Psychiatric:         Mood and Affect: Mood normal.         Behavior: Behavior normal.     Results Reviewed:  Lab Results (last 24 hours)       Procedure Component Value Units Date/Time    Blood Culture - Blood, Hand, Digit Right [[id number]] Collected: 11/15/23 1237    Specimen: Blood from Hand, Digit Right Updated: 11/15/23 1252    C-reactive Protein [[id number]]  (Abnormal) Collected: 11/15/23 1158    Specimen: Blood Updated: 11/15/23 1241     C-Reactive Protein 5.03 mg/dL     Comprehensive Metabolic Panel [[id number]]  (Abnormal) Collected: 11/15/23 1158    Specimen: Blood Updated: 11/15/23 1238     Glucose 139 mg/dL      BUN 49 mg/dL      Creatinine 4.31 mg/dL      Sodium 141 mmol/L      Potassium 4.2 mmol/L      Chloride 105 mmol/L      CO2 25.0 mmol/L      Calcium 9.5 mg/dL      Total Protein 7.0 g/dL      Albumin 3.8 g/dL      ALT (SGPT) 7 U/L      AST (SGOT) 12 U/L      Alkaline Phosphatase 130 U/L      Total Bilirubin 0.3 mg/dL      Globulin 3.2 gm/dL      A/G Ratio 1.2 g/dL      BUN/Creatinine Ratio 11.4     Anion Gap 11.0 mmol/L      eGFR 10.3 mL/min/1.73      Comment: <15 Indicative of kidney failure       Narrative:      GFR Normal >60  Chronic Kidney Disease <60  Kidney Failure <15    The GFR formula is only valid for adults with stable renal function between ages 18 and 70.    Lactic Acid, Plasma [[id number]]  (Normal) Collected: 11/15/23 1158    Specimen: Blood Updated: 11/15/23 1234     Lactate 1.9 mmol/L     Magnesium  [[id number]]  (Normal) Collected: 11/15/23 1158    Specimen: Blood Updated: 11/15/23 1232     Magnesium 1.9 mg/dL     Sedimentation Rate [[id number]]  (Abnormal) Collected: 11/15/23 1158    Specimen: Blood Updated: 11/15/23 1231     Sed Rate 84 mm/hr     Protime-INR [[id number]]  (Abnormal) Collected: 11/15/23 1158    Specimen: Blood Updated: 11/15/23 1230     Protime 14.3 Seconds      INR 1.10    aPTT [[id number]]  (Normal) Collected: 11/15/23 1158    Specimen: Blood Updated: 11/15/23 1230     PTT 32.4 seconds     Blood Culture - Blood, Hand, Digit Right [[id number]] Collected: 11/15/23 1158    Specimen: Blood from Hand, Digit Right Updated: 11/15/23 1217    CBC & Differential [[id number]]  (Abnormal) Collected: 11/15/23 1158    Specimen: Blood Updated: 11/15/23 1216    Narrative:      The following orders were created for panel order CBC & Differential.  Procedure                               Abnormality         Status                     ---------                               -----------         ------                     CBC Auto Differential[[id number]]        Abnormal            Final result                 Please view results for these tests on the individual orders.    CBC Auto Differential [[id number]]  (Abnormal) Collected: 11/15/23 1158    Specimen: Blood Updated: 11/15/23 1216     WBC 8.45 10*3/mm3      RBC 3.48 10*6/mm3      Hemoglobin 11.0 g/dL      Hematocrit 34.8 %      .0 fL      MCH 31.6 pg      MCHC 31.6 g/dL      RDW 13.9 %      RDW-SD 50.4 fl      MPV 11.0 fL      Platelets 247 10*3/mm3      Neutrophil % 76.7 %      Lymphocyte % 13.7 %      Monocyte % 7.3 %      Eosinophil % 1.2 %      Basophil % 0.6 %      Immature Grans % 0.5 %      Neutrophils, Absolute 6.48 10*3/mm3      Lymphocytes, Absolute 1.16 10*3/mm3      Monocytes, Absolute 0.62 10*3/mm3      Eosinophils, Absolute 0.10 10*3/mm3      Basophils, Absolute 0.05 10*3/mm3      Immature Grans, Absolute 0.04 10*3/mm3      nRBC 0.0 /100 WBC            Imaging Results (Last 24 Hours)       Procedure Component Value Units Date/Time    US Arterial Doppler Lower Extremity Complete [[id number]] Collected: 11/15/23 1616     Updated: 11/15/23 1621    Narrative:         History: PAD     Comments: Grayscale imaging as well as color flow duplex were used to  evaluate bilateral lower extremity arterial systems.     On the right, the peak systolic velocity in the common femoral artery  measured 240.6 cm/s. In the profunda femoris artery measured 200.7 cm/s.  The entire SFA and popliteal artery are occluded. The posterior tibial  artery is also occluded. In the anterior tibial artery measured 63.3  cm/s.     On the left, the peak systolic velocity in the common femoral artery  measured 39.1 cm/s. In the profunda femoris artery measured 64 cm/s. In  the proximal SFA measured 21.2 cm/s. The mid to distal SFA and popliteal  artery are occluded. In the posterior tibial artery measured 13 cm/s. In  the anterior tibial artery measured 32.4 cm/s.       Impression:      Chronic bilateral SFA and popliteal artery occlusions. There  is reconstitution of flow in both tibial vessels in the lower  extremities.     This report was signed and finalized on 11/15/2023 4:18 PM CST by Dr. Kwame Villagomez MD.       XR Toe 2+ View Left [[id number]] Collected: 11/15/23 1306     Updated: 11/15/23 1311    Narrative:      EXAMINATION: XR TOE 2+ VW LEFT- 11/15/2023 1:06 PM CST     HISTORY: Concern for osteomyelitis left fourth toe from possible dry  gangrene.     REPORT: 3 views of the left toes were obtained.     COMPARISON: There are no correlative imaging studies for comparison.     There is diffuse osteopenia, no fracture is identified. No bony  destruction is identified and the joint spaces are preserved. There is  moderate overgrowth of the first metatarsal head with bunion formation.  Vascular calcifications are present.       Impression:      No acute osseous abnormality, no evidence  of osteomyelitis.  There is diffuse osteopenia.     This report was signed and finalized on 11/15/2023 1:08 PM CST by Dr. Rafael Amor MD.             I have personally reviewed and interpreted the radiology studies and ECG obtained at time of admission.     Assessment / Plan   Assessment:   Active Hospital Problems    Diagnosis     **Ischemic rest pain of lower extremity     End stage renal disease on dialysis     Type 2 diabetes mellitus, with long-term current use of insulin     Primary hypertension     Coronary artery disease involving native coronary artery of native heart without angina pectoris     Chronic GERD      Treatment Plan  The patient will be admitted to my service here at [facility].   Admit to monitored bed  Vitals every 4 hours  Diet cardiac ADA when OK with vascular surgery  Up with assistance, fall precautions  Pain control > Tylenol 650 > Lortab 5 > Dilaudid 0.5 IV q4h prn  Heparin drip protocol D for renal failure  Vascular surgery consult    ESRD > Nephrology consult in place    DM > Hold Levemir  Check A1C, lipid panel  Humalog low dose sliding scale    HTN > Continue home medications Lisinopril    DVT prophylaxis > Patient currently fully anticoagulated    Medical Decision Making  Number and Complexity of problems: 4 complex medical problems  Differential Diagnosis: none    Conditions and Status        Condition is unchanged.     [facility] Data  External documents reviewed: "[employer] EHR, care everywhere records  Cardiac tracing (EKG, telemetry) interpretation: EKG pending  Radiology interpretation: See above  Labs reviewed: See above  Any tests that were considered but not ordered: none     Decision rules/scores evaluated (example [id number], [place], etc): N/A     Discussed with: Patient and brother at bedside     Care Planning  Shared decision making: Patient  Code status and discussions: Full code    Disposition  Social Determinants of Health that impact treatment or disposition:  none  Estimated length of stay is over 2 midnights.     I confirmed that the patient's advanced care plan is present, code status is documented, and a surrogate decision maker is listed in the patient's medical record.     The patient's surrogate decision maker is family, see records.     The patient was seen and examined by me on 11/15/2023 at 1650.    Electronically signed by Salvatore Miller MD, 11/15/23, 16:50 CST.              Electronically signed by Salvatore Miller MD at 11/15/23 1740          Emergency Department Notes        Kenisha Banuelos, RN at 11/15/23 1712          Nursing report ED to floor  Harmony Montoya  73 y.o.  female    HPI:   Chief Complaint   Patient presents with    Toe Injury       Admitting doctor:   Salvatore Miller MD    Consulting provider(s):  Consults       Date and Time Order Name Status Description    11/15/2023  4:51 PM Inpatient Nephrology Consult Completed              Admitting diagnosis:   The primary encounter diagnosis was End stage renal disease on dialysis. Diagnoses of Arterial occlusion, lower extremity and Intractable pain were also pertinent to this visit.    Code status:   Current Code Status       Date Active Code Status Order ID Comments User Context       Prior            Allergies:   Statins    Intake and Output    Intake/Output Summary (Last 24 hours) at 11/15/2023 1712  Last data filed at 11/15/2023 1351  Gross per 24 hour   Intake 500 ml   Output --   Net 500 ml       Weight:       11/15/23  1045   Weight: 56.7 kg (125 lb)       Most recent vitals:   Vitals:    11/15/23 1118 11/15/23 1240 11/15/23 1346 11/15/23 1455   BP:  145/54 148/59 133/54   BP Location:       Patient Position:       Pulse: 80 67 66 70   Resp:    14   Temp:       TempSrc:       SpO2: 100% 99% 100% 100%   Weight:       Height:         Oxygen Therapy: .    Active LDAs/IV Access:   Lines, Drains & Airways       Active LDAs       Name Placement date Placement time Site Days     Peripheral IV 11/15/23 1150 Anterior;Distal;Right Forearm 11/15/23  1150  Forearm  less than 1    Peritoneal Dialysis Catheter Right lower abdomen --  --  Right lower abdomen  --                    Labs (abnormal labs have a star):   Labs Reviewed   COMPREHENSIVE METABOLIC PANEL - Abnormal; Notable for the following components:       Result Value    Glucose 139 (*)     BUN 49 (*)     Creatinine 4.31 (*)     Alkaline Phosphatase 130 (*)     eGFR 10.3 (*)     All other components within normal limits    Narrative:     GFR Normal >60  Chronic Kidney Disease <60  Kidney Failure <15    The GFR formula is only valid for adults with stable renal function between ages 18 and 70.   PROTIME-INR - Abnormal; Notable for the following components:    INR 1.10 (*)     All other components within normal limits   C-REACTIVE PROTEIN - Abnormal; Notable for the following components:    C-Reactive Protein 5.03 (*)     All other components within normal limits   SEDIMENTATION RATE - Abnormal; Notable for the following components:    Sed Rate 84 (*)     All other components within normal limits   CBC WITH AUTO DIFFERENTIAL - Abnormal; Notable for the following components:    RBC 3.48 (*)     Hemoglobin 11.0 (*)     .0 (*)     Neutrophil % 76.7 (*)     Lymphocyte % 13.7 (*)     All other components within normal limits   MAGNESIUM - Normal   APTT - Normal   LACTIC ACID, PLASMA - Normal   BLOOD CULTURE   BLOOD CULTURE   CBC AND DIFFERENTIAL    Narrative:     The following orders were created for panel order CBC & Differential.  Procedure                               Abnormality         Status                     ---------                               -----------         ------                     CBC Auto Differential[[id number]]        Abnormal            Final result                 Please view results for these tests on the individual orders.       Meds given in ED:   Medications   Morphine sulfate (PF) injection 2 mg (2 mg  "Intravenous Given 11/15/23 1159)   ondansetron (ZOFRAN) injection 4 mg (4 mg Intravenous Given 11/15/23 1159)   sodium chloride 0.9 % bolus 500 mL (0 mL Intravenous Stopped 11/15/23 1351)   HYDROmorphone (DILAUDID) injection 1 mg (1 mg Intravenous Given 11/15/23 1454)     No current facility-administered medications for this encounter.       NIH Stroke Scale:       Isolation/Infection(s):  No active isolations   No active infections     COVID Testing  Collected .  Resulted .    Nursing report ED to floor:  Mental status: .  Ambulatory status: .  Precautions: .    ED nurse phone extentsion- ..       Electronically signed by Kenisha Banuelos RN at 11/15/23 1712       Linden Howe Jr., MD at 11/15/23 1203          HPI:    73-year-old -American female who presents to the emergency room with a complaint of having left greater than right foot pain.  Patient states that for the past month or 2 she has had problems with pain in her left foot.  Patient states that she has [facility] and she never got the results of the ultrasounds of her lower extremities.  Patient states over the past week to her fourth toe on the left foot has become black.  Patient states that from her knee down to her foot on the left greater than the right the skin is changed and feels really tough and is very painful to bear weight especially on the left foot.  Some friends of hers told her this may be a \"vascular problem\" and told her she should come to the emergency room to see a vascular surgeon.  Patient rates her pain 10 out of 10 and states that she has had no fevers chills or night sweats no nausea vomiting diarrhea.        REVIEW OF SYSTEMS  CONSTITUTIONAL:  No complaints of fever, chills,or weakness  EYES:  No complaints of discharge   ENT: No complaints of sore throat or ear pain  CARDIOVASCULAR:  No complaints of chest pain, palpitations, or " swelling  RESPIRATORY:  No complaints of cough or shortness of breath  GI:  No complaints of abdominal pain, nausea, vomiting, or diarrhea  MUSCULOSKELETAL: Positive for bilateral lower extremity pain greatest in the left leg from knee to foot than the right   SKIN: Positive for darkened skin and necrotic appearing left fourth toe  NEUROLOGIC:  No complaints of headache, focal weakness, or sensory changes  ENDOCRINE:  No complaints of polyuria or polydipsia  LYMPHATIC:  No complaints of swollen glands  GENITOURINARY: No complaints of urinary frequency or hematuria        PAST MEDICAL HISTORY  History reviewed. No pertinent past medical history.    FAMILY HISTORY  History reviewed. No pertinent family history.    SOCIAL HISTORY  Social History     Socioeconomic History    Marital status: Single   Tobacco Use    Smoking status: Former     Types: Cigarettes     Quit date: 2021     Years since quittin.5    Smokeless tobacco: Never   Vaping Use    Vaping Use: Never used   Substance and Sexual Activity    Alcohol use: Never    Drug use: Never       IMMUNIZATION HISTORY  Deferred to primary care physician.    SURGICAL HISTORY  History reviewed. No pertinent surgical history.    CURRENT MEDICATIONS  No current facility-administered medications for this encounter.    Current Outpatient Medications:     Ascorbic Acid (Vitamin C) 500 MG capsule, Take 1 capsule by mouth., Disp: , Rfl:     aspirin 81 MG chewable tablet, Chew 1 tablet Daily., Disp: , Rfl:     carvedilol (COREG) 25 MG tablet, Take 1 tablet by mouth 2 (Two) Times a Day With Meals., Disp: , Rfl:     clopidogrel (PLAVIX) 75 MG tablet, Take 1 tablet by mouth Daily., Disp: , Rfl:     diphenhydrAMINE (BENADRYL) 25 mg capsule, Take 1 capsule by mouth Daily., Disp: , Rfl:     insulin glargine (LANTUS, SEMGLEE) 100 UNIT/ML injection, Inject 7 Units under the skin into the appropriate area as directed Daily., Disp: , Rfl:     Insulin Lispro (humaLOG) 100 UNIT/ML  "injection, Inject 6 Units under the skin into the appropriate area as directed Daily., Disp: , Rfl:     lisinopril (PRINIVIL,ZESTRIL) 2.5 MG tablet, Take 1 tablet by mouth 2 (Two) Times a Day., Disp: , Rfl:     omeprazole (priLOSEC) 40 MG capsule, Take 1 capsule by mouth Daily., Disp: , Rfl:     vitamin D3 125 MCG (5000 UT) capsule capsule, Take 1,000 Units by mouth Daily., Disp: , Rfl:     ALLERGIES  Allergies   Allergen Reactions    Statins Swelling       Musculoskeletal exam    VITAL SIGNS:   /54   Pulse 70   Temp 97.9 °F (36.6 °C) (Temporal)   Resp 14   Ht 160 cm (63\")   Wt 56.7 kg (125 lb)   SpO2 100%   BMI 22.14 kg/m²     Constitutional: Patient is alert and in no distress.  Patient with severe left foot and fourth toe pain and moderate right foot discomfort.    ENT: There is a normal pharynx with no acute erythema or exudate and oral mucosa is moist.  Nose is clear with no drainage.  Tympanic membranes intact and non-erythemic    Cardiovascular: S1-S2 regular rate and rhythm no murmurs rubs or gallops is noted.  Difficult to get dorsalis pedis and posterior tibial pulses on the left lower extremity.  Right lower extremity below the calf is very weak but present.  Cap refill of the toes is greatly delayed and almost nonexistent on the left when compared to the right which is delayed but present.    Respiratory: Patient is clear to auscultation bilaterally with no wheezing or rhonchi.  Chest wall is nontender.  There is no external lesions on the chest.  There is no crepitance    Abdomen: Soft nontender bowel sounds are normal in all 4 quadrants there is no rebound or guarding noted.  There is no abdominal distention or hepatosplenomegaly.    Musculoskeletal: Left foot and lower leg: Woody appearance of the skin with thick texture of the skin.  There is decreased circulation appearing in this leg with a necrotic appearing left fourth toe.  Severe pain with palpation over the distal calf leg and " foot.    Integumentary: No acute changes noted    Genitourinary: Patient is voiding appropriately.    Psychiatric: Normal affect and mood      RADIOLOGY/PROCEDURES        XR Toe 2+ View Left   Final Result   No acute osseous abnormality, no evidence of osteomyelitis.   There is diffuse osteopenia.       This report was signed and finalized on 11/15/2023 1:08 PM CST by Dr. Rafael Amor MD.          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 There   is reconstitution of flow in both tibial vessels in the lower   extremities.       This report was signed and finalized on 11/15/2023 4:18 PM CST by Dr. Kwame Villagomez MD.                 FUTURE APPOINTMENTS     No future appointments.         COURSE & MEDICAL DECISION MAKING    Patient's partial differential diagnosis can include:  Arterial occlusion, osteomyelitis, dry gangrene, and others    Discussed case with vascular surgeon Dr. Villagomez who feels that the patient will likely need bilateral lower extremity amputation.  He believes this is not emergent but does recommend that the patient could be admitted for pain control and placed on heparin drip.  Which we will follow his instructions.    Discussed case with nephrologist Dr. Wright who is aware of the patient needing dialysis due to her missing dialysis on Monday and is supposed to have dialysis today.  He will put her on his dialysis caseload.  Now we will call the hospitalist for admission.    Discussed case with hospitalist Dr. Miller who will accept the patient to his medical service.  Dr. Villagomez will be consulted.  Dr. Wright is also aware of the patient's kidney disease and need for dialysis.    Patient's level of risk: High      CRITICAL CARE    CRITICAL CARE: No    CRITICAL CARE TIME: None      Recent Results (from the past 24 hour(s))   Comprehensive Metabolic Panel    Collection Time: 11/15/23 11:58 AM    Specimen:  Blood   Result Value Ref Range    Glucose 139 (H) 65 - 99 mg/dL    BUN 49 (H) 8 - 23 mg/dL    Creatinine 4.31 (H) 0.57 - 1.00 mg/dL    Sodium 141 136 - 145 mmol/L    Potassium 4.2 3.5 - 5.2 mmol/L    Chloride 105 98 - 107 mmol/L    CO2 25.0 22.0 - 29.0 mmol/L    Calcium 9.5 8.6 - 10.5 mg/dL    Total Protein 7.0 6.0 - 8.5 g/dL    Albumin 3.8 3.5 - 5.2 g/dL    ALT (SGPT) 7 1 - 33 U/L    AST (SGOT) 12 1 - 32 U/L    Alkaline Phosphatase 130 (H) 39 - 117 U/L    Total Bilirubin 0.3 0.0 - 1.2 mg/dL    Globulin 3.2 gm/dL    A/G Ratio 1.2 g/dL    BUN/Creatinine Ratio 11.4 7.0 - 25.0    Anion Gap 11.0 5.0 - 15.0 mmol/L    eGFR 10.3 (L) >60.0 mL/min/1.73   Magnesium    Collection Time: 11/15/23 11:58 AM    Specimen: Blood   Result Value Ref Range    Magnesium 1.9 1.6 - 2.4 mg/dL   Protime-INR    Collection Time: 11/15/23 11:58 AM    Specimen: Blood   Result Value Ref Range    Protime 14.3 11.8 - 14.8 Seconds    INR 1.10 (H) 0.91 - 1.09   aPTT    Collection Time: 11/15/23 11:58 AM    Specimen: Blood   Result Value Ref Range    PTT 32.4 24.5 - 36.0 seconds   Lactic Acid, Plasma    Collection Time: 11/15/23 11:58 AM    Specimen: Blood   Result Value Ref Range    Lactate 1.9 0.5 - 2.0 mmol/L   C-reactive Protein    Collection Time: 11/15/23 11:58 AM    Specimen: Blood   Result Value Ref Range    C-Reactive Protein 5.03 (H) 0.00 - 0.50 mg/dL   Sedimentation Rate    Collection Time: 11/15/23 11:58 AM    Specimen: Blood   Result Value Ref Range    Sed Rate 84 (H) 0 - 30 mm/hr   CBC Auto Differential    Collection Time: 11/15/23 11:58 AM    Specimen: Blood   Result Value Ref Range    WBC 8.45 3.40 - 10.80 10*3/mm3    RBC 3.48 (L) 3.77 - 5.28 10*6/mm3    Hemoglobin 11.0 (L) 12.0 - 15.9 g/dL    Hematocrit 34.8 34.0 - 46.6 %    .0 (H) 79.0 - 97.0 fL    MCH 31.6 26.6 - 33.0 pg    MCHC 31.6 31.5 - 35.7 g/dL    RDW 13.9 12.3 - 15.4 %    RDW-SD 50.4 37.0 - 54.0 fl    MPV 11.0 6.0 - 12.0 fL    Platelets 247 140 - 450 10*3/mm3     Neutrophil % 76.7 (H) 42.7 - 76.0 %    Lymphocyte % 13.7 (L) 19.6 - 45.3 %    Monocyte % 7.3 5.0 - 12.0 %    Eosinophil % 1.2 0.3 - 6.2 %    Basophil % 0.6 0.0 - 1.5 %    Immature Grans % 0.5 0.0 - 0.5 %    Neutrophils, Absolute 6.48 1.70 - 7.00 10*3/mm3    Lymphocytes, Absolute 1.16 0.70 - 3.10 10*3/mm3    Monocytes, Absolute 0.62 0.10 - 0.90 10*3/mm3    Eosinophils, Absolute 0.10 0.00 - 0.40 10*3/mm3    Basophils, Absolute 0.05 0.00 - 0.20 10*3/mm3    Immature Grans, Absolute 0.04 0.00 - 0.05 10*3/mm3    nRBC 0.0 0.0 - 0.2 /100 WBC           the last primary care visit was reviewed by me in the EPIC electronic medical record.      Also  Old charts were reviewed per [facility] EMR.  Pertinent details are summarized above.  All laboratory, radiologic, and EKG studies that were performed in the Emergency Department were a necessary part of the evaluation needed to exclude unstable or  emergent medical conditions.     Patient was hemodynamically and neurologically stable in the ED.   Pertinent studies were reviewed as above.     The patient received:  Medications   Morphine sulfate (PF) injection 2 mg (2 mg Intravenous Given 11/15/23 1159)   ondansetron (ZOFRAN) injection 4 mg (4 mg Intravenous Given 11/15/23 1159)   sodium chloride 0.9 % bolus 500 mL (0 mL Intravenous Stopped 11/15/23 1351)   HYDROmorphone (DILAUDID) injection 1 mg (1 mg Intravenous Given 11/15/23 9064)            ED Disposition       ED Disposition   Decision to Admit    Condition   --    Comment   --                 Dragon disclaimer:  Part of this note may be an electronic transcription/translation of spoken language to printed text using the Dragon Dictation System.     I have reviewed the patient’s prescription history via a prescription monitoring program.  This information is consistent with my knowledge of the patient’s controlled substance use history.    Patient evaluate during Coronavirus Pandemic. Isolation practices followed according to  [facility] policy.    FINAL IMPRESSION   Diagnosis Plan   1. End stage renal disease on dialysis        2. Arterial occlusion, lower extremity        3. Intractable pain              MD Shyam Wheat Jr, Thomas Mark Jr., MD  11/15/23 1651      Electronically signed by Linden Howe Jr., MD at 11/15/23 1651       Vital Signs (last day)       Date/Time Temp Temp src Pulse Resp BP Patient Position SpO2    11/16/23 0739 98.3 (36.8) Oral 68 16 184/56 Lying 100    11/16/23 0325 99 (37.2) Oral 68 16 174/53 Lying 100    11/15/23 1828 98 (36.7) Oral 115 16 145/53 Lying 100    11/15/23 14:55:01 -- -- 70 14 133/54 -- 100    11/15/23 1346 -- -- 66 -- 148/59 -- 100    11/15/23 1240 -- -- 67 -- 145/54 -- 99    11/15/23 1118 -- -- 80 -- -- -- 100    11/15/23 1045 97.9 (36.6) Temporal 74 18 169/59 Sitting 98          Current Facility-Administered Medications   Medication Dose Route Frequency Provider Last Rate Last Admin    acetaminophen (TYLENOL) tablet 650 mg  650 mg Oral Q4H PRN Salvatore Miller MD   650 mg at 11/16/23 0310    aspirin chewable tablet 81 mg  81 mg Oral Daily Salvatore Miller MD   81 mg at 11/16/23 0812    sennosides-docusate (PERICOLACE) 8.6-50 MG per tablet 2 tablet  2 tablet Oral BID Salvatore Miller MD   2 tablet at 11/16/23 0812    And    polyethylene glycol (MIRALAX) packet 17 g  17 g Oral Daily PRN Salvatore Miller MD        And    bisacodyl (DULCOLAX) EC tablet 5 mg  5 mg Oral Daily PRN Salvatore Miller MD        And    bisacodyl (DULCOLAX) suppository 10 mg  10 mg Rectal Daily PRN Salvatore Miller MD        heparin (porcine) injection 1,700 Units  30 Units/kg Intravenous PRN Salvatore Miller MD        heparin (porcine) injection 3,400 Units  60 Units/kg Intravenous PRN Salvatore Miller MD   3,400 Units at 11/16/23 0814    heparin 31408 units/250 mL (100 units/mL) in 0.45 % NaCl infusion  12 Units/kg/hr Intravenous  Titrated Salvatore Miller MD 8.8 mL/hr at 11/16/23 0714 15.52 Units/kg/hr at 11/16/23 0714    HYDROcodone-acetaminophen (NORCO) 5-325 MG per tablet 1 tablet  1 tablet Oral Q4H PRN Salvatore Miller MD   1 tablet at 11/16/23 0624    HYDROmorphone (DILAUDID) injection 0.5 mg  0.5 mg Intravenous Q4H PRN Salvatore Miller MD        And    naloxone (NARCAN) injection 0.4 mg  0.4 mg Intravenous Q5 Min PRN Salvatore Miller MD        lisinopril (PRINIVIL,ZESTRIL) tablet 2.5 mg  2.5 mg Oral BID Salvatore Miller MD   2.5 mg at 11/16/23 0812    nitroglycerin (NITROSTAT) SL tablet 0.4 mg  0.4 mg Sublingual Q5 Min PRN Salvatore Miller MD        ondansetron (ZOFRAN) tablet 4 mg  4 mg Oral Q6H PRN Salvatore Miller MD        pantoprazole (PROTONIX) EC tablet 40 mg  40 mg Oral Q AM Salvatore Miller MD   40 mg at 11/16/23 0553    sodium chloride 0.9 % flush 10 mL  10 mL Intravenous Q12H Salvatore Miller MD   10 mL at 11/16/23 0812    sodium chloride 0.9 % flush 10 mL  10 mL Intravenous PRN Salvatore Miller MD        sodium chloride 0.9 % infusion 40 mL  40 mL Intravenous PRN Salvatore Miller MD            Consult Notes (last 24 hours)        Michael Wright MD at 11/15/23 1657        Consult Orders    1. Inpatient Nephrology Consult [[id number]] ordered by Linden Hoew Jr., MD at 11/15/23 1651                 Nephrology ([facility]) Consult Note      Patient:  Harmony Montoya  YOB: 1950  Date of Service: 11/15/2023  MRN: [id number]   Acct: [id number]   Primary Care Physician: Provider, No Known  Advance Directive:   There are no questions and answers to display.     Admit Date: 11/15/2023       Hospital Day: 0  Referring Provider: No ref. provider found      Patient Seen, Chart, Consults, Notes, Labs, Radiology studies reviewed.      Subjective:  Harmony Montoya is a 73 y.o. female  whom we were  consulted for end-stage renal disease management.  Patient presents to the emergency room complaining of severe pain in her feet mostly the left 1.  She is also describing some gangrenous changes to the fourth toe.  She has no fever or chills.  Her medical history is positive for hypertension, type 2 diabetes and end-stage renal disease.  She has been on chronic maintenance hemodialysis since 2022.  She has a left upper extremity arteriovenous fistula.  Patient received dialysis thrice weekly at the [facility] dialysis unit.  She has missed her dialysis today.  She offers no other complaints.    Allergies:  Statins    Home Meds:  (Not in a hospital admission)      Medicines:  No current facility-administered medications for this encounter.     Current Outpatient Medications   Medication Sig Dispense Refill    Ascorbic Acid (Vitamin C) 500 MG capsule Take 1 capsule by mouth.      aspirin 81 MG chewable tablet Chew 1 tablet Daily.      carvedilol (COREG) 25 MG tablet Take 1 tablet by mouth 2 (Two) Times a Day With Meals.      clopidogrel (PLAVIX) 75 MG tablet Take 1 tablet by mouth Daily.      diphenhydrAMINE (BENADRYL) 25 mg capsule Take 1 capsule by mouth Daily.      insulin glargine (LANTUS, SEMGLEE) 100 UNIT/ML injection Inject 7 Units under the skin into the appropriate area as directed Daily.      Insulin Lispro (humaLOG) 100 UNIT/ML injection Inject 6 Units under the skin into the appropriate area as directed Daily.      lisinopril (PRINIVIL,ZESTRIL) 2.5 MG tablet Take 1 tablet by mouth 2 (Two) Times a Day.      omeprazole (priLOSEC) 40 MG capsule Take 1 capsule by mouth Daily.      vitamin D3 125 MCG (5000 UT) capsule capsule Take 1,000 Units by mouth Daily.         Past Medical History:  History reviewed. No pertinent past medical history.    Past Surgical History:  History reviewed. No pertinent surgical history.    Family History  History reviewed. No pertinent family history.    Social  "History  Social History     Socioeconomic History    Marital status: Single   Tobacco Use    Smoking status: Former     Types: Cigarettes     Quit date: 2021     Years since quittin.5    Smokeless tobacco: Never   Vaping Use    Vaping Use: Never used   Substance and Sexual Activity    Alcohol use: Never    Drug use: Never         Review of Systems:  History obtained from chart review and the patient  General ROS: No fever or chills  Respiratory ROS: No cough, shortness of breath, wheezing  Cardiovascular ROS: no chest pain or dyspnea on exertion  Gastrointestinal ROS: No abdominal pain or melena  Genito-Urinary ROS: No dysuria or hematuria  14 point ROS reviewed with the patient and negative except as noted above and in the HPI unless unable to obtain.    Objective:  /54   Pulse 70   Temp 97.9 °F (36.6 °C) (Temporal)   Resp 14   Ht 160 cm (63\")   Wt 56.7 kg (125 lb)   SpO2 100%   BMI 22.14 kg/m²     Intake/Output Summary (Last 24 hours) at 11/15/2023 1657  Last data filed at 11/15/2023 1351  Gross per 24 hour   Intake 500 ml   Output --   Net 500 ml     General: awake/alert   Head: Atraumatic, normocephalic  Neck: No masses, no JVD  Chest:  clear to auscultation bilaterally without respiratory distress  CVS: regular rate and rhythm  Abdominal: soft, nontender, normal bowel sounds  Extremities: Trace leg edema  Skin: warm and dry without rash  Neuro: No focal motor deficits  Musculoskeletal: No obvious joint effusions    Labs:  Lab Results (last 72 hours)       Procedure Component Value Units Date/Time    Blood Culture - Blood, Hand, Digit Right [[id number]] Collected: 11/15/23 1237    Specimen: Blood from Hand, Digit Right Updated: 11/15/23 1252    C-reactive Protein [[id number]]  (Abnormal) Collected: 11/15/23 115    Specimen: Blood Updated: 11/15/23 1241     C-Reactive Protein 5.03 mg/dL     Comprehensive Metabolic Panel [[id number]]  (Abnormal) Collected: 11/15/23 115    Specimen: Blood " Updated: 11/15/23 1238     Glucose 139 mg/dL      BUN 49 mg/dL      Creatinine 4.31 mg/dL      Sodium 141 mmol/L      Potassium 4.2 mmol/L      Chloride 105 mmol/L      CO2 25.0 mmol/L      Calcium 9.5 mg/dL      Total Protein 7.0 g/dL      Albumin 3.8 g/dL      ALT (SGPT) 7 U/L      AST (SGOT) 12 U/L      Alkaline Phosphatase 130 U/L      Total Bilirubin 0.3 mg/dL      Globulin 3.2 gm/dL      A/G Ratio 1.2 g/dL      BUN/Creatinine Ratio 11.4     Anion Gap 11.0 mmol/L      eGFR 10.3 mL/min/1.73      Comment: <15 Indicative of kidney failure       Narrative:      GFR Normal >60  Chronic Kidney Disease <60  Kidney Failure <15    The GFR formula is only valid for adults with stable renal function between ages 18 and 70.    Lactic Acid, Plasma [[id number]]  (Normal) Collected: 11/15/23 1158    Specimen: Blood Updated: 11/15/23 1234     Lactate 1.9 mmol/L     Magnesium [[id number]]  (Normal) Collected: 11/15/23 1158    Specimen: Blood Updated: 11/15/23 1232     Magnesium 1.9 mg/dL     Sedimentation Rate [[id number]]  (Abnormal) Collected: 11/15/23 1158    Specimen: Blood Updated: 11/15/23 1231     Sed Rate 84 mm/hr     Protime-INR [[id number]]  (Abnormal) Collected: 11/15/23 1158    Specimen: Blood Updated: 11/15/23 1230     Protime 14.3 Seconds      INR 1.10    aPTT [[id number]]  (Normal) Collected: 11/15/23 1158    Specimen: Blood Updated: 11/15/23 1230     PTT 32.4 seconds     Blood Culture - Blood, Hand, Digit Right [[id number]] Collected: 11/15/23 1158    Specimen: Blood from Hand, Digit Right Updated: 11/15/23 1217    CBC & Differential [[id number]]  (Abnormal) Collected: 11/15/23 1158    Specimen: Blood Updated: 11/15/23 1216    Narrative:      The following orders were created for panel order CBC & Differential.  Procedure                               Abnormality         Status                     ---------                               -----------         ------                     CBC Auto Differential[[id number]]         Abnormal            Final result                 Please view results for these tests on the individual orders.    CBC Auto Differential [[id number]]  (Abnormal) Collected: 11/15/23 1158    Specimen: Blood Updated: 11/15/23 1216     WBC 8.45 10*3/mm3      RBC 3.48 10*6/mm3      Hemoglobin 11.0 g/dL      Hematocrit 34.8 %      .0 fL      MCH 31.6 pg      MCHC 31.6 g/dL      RDW 13.9 %      RDW-SD 50.4 fl      MPV 11.0 fL      Platelets 247 10*3/mm3      Neutrophil % 76.7 %      Lymphocyte % 13.7 %      Monocyte % 7.3 %      Eosinophil % 1.2 %      Basophil % 0.6 %      Immature Grans % 0.5 %      Neutrophils, Absolute 6.48 10*3/mm3      Lymphocytes, Absolute 1.16 10*3/mm3      Monocytes, Absolute 0.62 10*3/mm3      Eosinophils, Absolute 0.10 10*3/mm3      Basophils, Absolute 0.05 10*3/mm3      Immature Grans, Absolute 0.04 10*3/mm3      nRBC 0.0 /100 WBC             Radiology:   Imaging Results (Last 72 Hours)       Procedure Component Value Units Date/Time    US Arterial Doppler Lower Extremity Complete [[id number]] Collected: 11/15/23 1616     Updated: 11/15/23 1621    Narrative:         History: PAD     Comments: Grayscale imaging as well as color flow duplex were used to  evaluate bilateral lower extremity arterial systems.     On the right, the peak systolic velocity in the common femoral artery  measured 240.6 cm/s. In the profunda femoris artery measured 200.7 cm/s.  The entire SFA and popliteal artery are occluded. The posterior tibial  artery is also occluded. In the anterior tibial artery measured 63.3  cm/s.     On the left, the peak systolic velocity in the common femoral artery  measured 39.1 cm/s. In the profunda femoris artery measured 64 cm/s. In  the proximal SFA measured 21.2 cm/s. The mid to distal SFA and popliteal  artery are occluded. In the posterior tibial artery measured 13 cm/s. In  the anterior tibial artery measured 32.4 cm/s.       Impression:      Chronic bilateral SFA and  "popliteal artery occlusions. There  is reconstitution of flow in both tibial vessels in the lower  extremities.     This report was signed and finalized on 11/15/2023 4:18 PM CST by Dr. Kwame Villagomez MD.       XR Toe 2+ View Left [[id number]] Collected: 11/15/23 1306     Updated: 11/15/23 1311    Narrative:      EXAMINATION: XR TOE 2+ VW LEFT- 11/15/2023 1:06 PM CST     HISTORY: Concern for osteomyelitis left fourth toe from possible dry  gangrene.     REPORT: 3 views of the left toes were obtained.     COMPARISON: There are no correlative imaging studies for comparison.     There is diffuse osteopenia, no fracture is identified. No bony  destruction is identified and the joint spaces are preserved. There is  moderate overgrowth of the first metatarsal head with bunion formation.  Vascular calcifications are present.       Impression:      No acute osseous abnormality, no evidence of osteomyelitis.  There is diffuse osteopenia.     This report was signed and finalized on 11/15/2023 1:08 PM CST by Dr. Rafael Amor MD.               Culture:  No components found for: \"WOUNDCUL\", \"3\"  No components found for: \"CSFCUL\", \"3\"  No components found for: \"BC\", \"3\"  No components found for: \"URINECUL\", \"3\"      Assessment   -End-stage renal disease  -Type 2 diabetes with renal disease  -Hypertension  -Peripheral vascular disease  -Ischemic changes to lower extremities  -Anemia of chronic kidney disease    Plan:  Patient was started on intravenous heparin.  She will be seen by vascular surgery.  We will continue dialysis thrice weekly.  We will schedule dialysis in a.m. as they make treatment for today.  Follow-up labs.      Thank you for the consult, we appreciate the opportunity to provide care to your patients.  Feel free to contact me if I can be of any further assistance.      Michael Wright MD  11/15/2023  16:57 CST    Electronically signed by Michael Wright MD at 11/15/23 1700       "

## 2023-11-16 NOTE — NURSING NOTE
Pt pulled out IV x 3 times in last 45 min. Confused. Dr. Miller notified. Plan to transfer to ICU for safety d/t risk of falls/bleeding while on hep gtt. PCA currently sitting with pt while unit bed being cleaned.

## 2023-11-16 NOTE — PLAN OF CARE
Goal Outcome Evaluation:         Hep gtt. 2L removed in dialysis today. VSS. Plan for LLE angiogram tomorrow. Denies any needs at this time.

## 2023-11-16 NOTE — PROGRESS NOTES
Nephrology ([facility]) Dialysis Progress  Note      Patient:  Harmony Montoya  YOB: 1950  Date of Service: 11/16/2023  MRN: [id number]   Acct: [id number]   Primary Care Physician: Provider, No Known  Advance Directive:   Code Status and Medical Interventions:   Ordered at: 11/15/23 2073     Code Status (Patient has no pulse and is not breathing):    CPR (Attempt to Resuscitate)     Medical Interventions (Patient has pulse or is breathing):    Full Support     Admit Date: 11/15/2023       Hospital Day: 1  Referring Provider: No ref. provider found      Patient Seen, Chart, Consults, Notes, Labs, Radiology studies reviewed.      Subjective:  Harmony Montoya is a 73 y.o. female  whom we were consulted for end-stage renal disease management.  Patient presents to the emergency room complaining of severe pain in her feet mostly the left 1.  She is also describing some gangrenous changes to the fourth toe.  She has no fever or chills.  Her medical history is positive for hypertension, type 2 diabetes and end-stage renal disease.  She has been on chronic maintenance hemodialysis since 2022.  She has a left upper extremity arteriovenous fistula.  Patient received dialysis thrice weekly at the [facility] dialysis unit.  She has missed her dialysis on 11/15.  She offers no other complaints.  Seen and examined on dialysis today. She continues to complain of leg pain. She remained on heparin.     Pt was seen on RRT  Modality: Hemodialysis  Access: Arterial Venous Fistula  Location: left upper  QB: 400  QD: 600  UF: 2 liters    Allergies:  Statins    Home Meds:  Medications Prior to Admission   Medication Sig Dispense Refill Last Dose    Ascorbic Acid (Vitamin C) 500 MG capsule Take 1 capsule by mouth.   11/15/2023    aspirin 81 MG chewable tablet Chew 1 tablet Daily.   11/14/2023    carvedilol (COREG) 25 MG tablet Take 1 tablet by mouth 2 (Two) Times a Day With Meals.    11/15/2023    clopidogrel (PLAVIX) 75 MG tablet Take 1 tablet by mouth Daily.   11/14/2023    diphenhydrAMINE (BENADRYL) 25 mg capsule Take 1 capsule by mouth Daily.   11/14/2023    insulin glargine (LANTUS, SEMGLEE) 100 UNIT/ML injection Inject 7 Units under the skin into the appropriate area as directed Daily.   11/15/2023    Insulin Lispro (humaLOG) 100 UNIT/ML injection Inject 6 Units under the skin into the appropriate area as directed Daily.   11/15/2023    lisinopril (PRINIVIL,ZESTRIL) 2.5 MG tablet Take 1 tablet by mouth 2 (Two) Times a Day.   11/15/2023    omeprazole (priLOSEC) 40 MG capsule Take 1 capsule by mouth Daily.   11/15/2023    vitamin D3 125 MCG (5000 UT) capsule capsule Take 1,000 Units by mouth Daily.   11/15/2023       Medicines:  Current Facility-Administered Medications   Medication Dose Route Frequency Provider Last Rate Last Admin    acetaminophen (TYLENOL) tablet 650 mg  650 mg Oral Q4H PRN Salvatore Miller MD   650 mg at 11/16/23 0310    aspirin chewable tablet 81 mg  81 mg Oral Daily Salvatore Miller MD   81 mg at 11/16/23 0812    sennosides-docusate (PERICOLACE) 8.6-50 MG per tablet 2 tablet  2 tablet Oral BID Salvatore Miller MD   2 tablet at 11/16/23 0812    And    polyethylene glycol (MIRALAX) packet 17 g  17 g Oral Daily PRN Salvatore Miller MD        And    bisacodyl (DULCOLAX) EC tablet 5 mg  5 mg Oral Daily PRN Salvatore Miller MD        And    bisacodyl (DULCOLAX) suppository 10 mg  10 mg Rectal Daily PRN Salvatore Miller MD        heparin (porcine) injection 1,700 Units  30 Units/kg Intravenous PRN Salvatore Miller MD        heparin (porcine) injection 3,400 Units  60 Units/kg Intravenous PRN Salvatore Miller MD   3,400 Units at 11/16/23 0814    heparin 20525 units/250 mL (100 units/mL) in 0.45 % NaCl infusion  12 Units/kg/hr Intravenous Titrated Salvatore Miller MD 8.8 mL/hr at 11/16/23 0714 15.52  Units/kg/hr at 23 0714    HYDROcodone-acetaminophen (NORCO) 5-325 MG per tablet 1 tablet  1 tablet Oral Q4H PRN Salvatore Miller MD   1 tablet at 23 0624    HYDROmorphone (DILAUDID) injection 0.5 mg  0.5 mg Intravenous Q4H PRN Salvatore Miller MD   0.5 mg at 23 1027    And    naloxone (NARCAN) injection 0.4 mg  0.4 mg Intravenous Q5 Min PRN Salvatore Miller MD        lisinopril (PRINIVIL,ZESTRIL) tablet 2.5 mg  2.5 mg Oral BID Salvatore Miller MD   2.5 mg at 23 0812    nitroglycerin (NITROSTAT) SL tablet 0.4 mg  0.4 mg Sublingual Q5 Min PRN Salvatore Miller MD        ondansetron (ZOFRAN) tablet 4 mg  4 mg Oral Q6H PRN Salvatore Miller MD        pantoprazole (PROTONIX) EC tablet 40 mg  40 mg Oral Q AM Salvatore Miller MD   40 mg at 23 0553    sodium chloride 0.9 % flush 10 mL  10 mL Intravenous Q12H Salvatore Miller MD   10 mL at 23 0812    sodium chloride 0.9 % flush 10 mL  10 mL Intravenous PRN Salvatore Miller MD        sodium chloride 0.9 % infusion 40 mL  40 mL Intravenous PRN Salvatore Miller MD           Past Medical History:  History reviewed. No pertinent past medical history.    Past Surgical History:  History reviewed. No pertinent surgical history.    Family History  History reviewed. No pertinent family history.    Social History  Social History     Socioeconomic History    Marital status: Single   Tobacco Use    Smoking status: Former     Types: Cigarettes     Quit date: 2021     Years since quittin.5    Smokeless tobacco: Never   Vaping Use    Vaping Use: Never used   Substance and Sexual Activity    Alcohol use: Never    Drug use: Never         Review of Systems:  History obtained from chart review and the patient  General ROS: No fever or chills  Respiratory ROS: No cough, shortness of breath, wheezing  Cardiovascular ROS: no chest pain or dyspnea on exertion  Gastrointestinal  "ROS: No abdominal pain or melena  Genito-Urinary ROS: No dysuria or hematuria  14 point ROS reviewed with the patient and negative except as noted above and in the HPI unless unable to obtain.    Objective:  BP (!) 184/56 (BP Location: Right arm, Patient Position: Lying)   Pulse 68   Temp 98.3 °F (36.8 °C) (Oral)   Resp 16   Ht 160 cm (63\")   Wt 56.7 kg (125 lb)   SpO2 100%   BMI 22.14 kg/m²     Intake/Output Summary (Last 24 hours) at 11/16/2023 1208  Last data filed at 11/15/2023 1351  Gross per 24 hour   Intake 500 ml   Output --   Net 500 ml     General: awake/alert   Head: Atraumatic, normocephalic  Neck: No masses, no JVD  Chest:  clear to auscultation bilaterally without respiratory distress  CVS: regular rate and rhythm  Abdominal: soft, nontender, normal bowel sounds  Extremities: Trace leg edema  Skin: warm and dry without rash  Neuro: No focal motor deficits  Musculoskeletal: No obvious joint effusions    Labs:  Lab Results (last 72 hours)       Procedure Component Value Units Date/Time    Blood Culture - Blood, Hand, Digit Right [[id number]] Collected: 11/15/23 1237    Specimen: Blood from Hand, Digit Right Updated: 11/15/23 1252    C-reactive Protein [[id number]]  (Abnormal) Collected: 11/15/23 1158    Specimen: Blood Updated: 11/15/23 1241     C-Reactive Protein 5.03 mg/dL     Comprehensive Metabolic Panel [[id number]]  (Abnormal) Collected: 11/15/23 1158    Specimen: Blood Updated: 11/15/23 1238     Glucose 139 mg/dL      BUN 49 mg/dL      Creatinine 4.31 mg/dL      Sodium 141 mmol/L      Potassium 4.2 mmol/L      Chloride 105 mmol/L      CO2 25.0 mmol/L      Calcium 9.5 mg/dL      Total Protein 7.0 g/dL      Albumin 3.8 g/dL      ALT (SGPT) 7 U/L      AST (SGOT) 12 U/L      Alkaline Phosphatase 130 U/L      Total Bilirubin 0.3 mg/dL      Globulin 3.2 gm/dL      A/G Ratio 1.2 g/dL      BUN/Creatinine Ratio 11.4     Anion Gap 11.0 mmol/L      eGFR 10.3 mL/min/1.73      Comment: <15 Indicative of " kidney failure       Narrative:      GFR Normal >60  Chronic Kidney Disease <60  Kidney Failure <15    The GFR formula is only valid for adults with stable renal function between ages 18 and 70.    Lactic Acid, Plasma [[id number]]  (Normal) Collected: 11/15/23 1158    Specimen: Blood Updated: 11/15/23 1234     Lactate 1.9 mmol/L     Magnesium [[id number]]  (Normal) Collected: 11/15/23 1158    Specimen: Blood Updated: 11/15/23 1232     Magnesium 1.9 mg/dL     Sedimentation Rate [[id number]]  (Abnormal) Collected: 11/15/23 1158    Specimen: Blood Updated: 11/15/23 1231     Sed Rate 84 mm/hr     Protime-INR [[id number]]  (Abnormal) Collected: 11/15/23 1158    Specimen: Blood Updated: 11/15/23 1230     Protime 14.3 Seconds      INR 1.10    aPTT [[id number]]  (Normal) Collected: 11/15/23 1158    Specimen: Blood Updated: 11/15/23 1230     PTT 32.4 seconds     Blood Culture - Blood, Hand, Digit Right [[id number]] Collected: 11/15/23 1158    Specimen: Blood from Hand, Digit Right Updated: 11/15/23 1217    CBC & Differential [[id number]]  (Abnormal) Collected: 11/15/23 1158    Specimen: Blood Updated: 11/15/23 1216    Narrative:      The following orders were created for panel order CBC & Differential.  Procedure                               Abnormality         Status                     ---------                               -----------         ------                     CBC Auto Differential[[id number]]        Abnormal            Final result                 Please view results for these tests on the individual orders.    CBC Auto Differential [[id number]]  (Abnormal) Collected: 11/15/23 1158    Specimen: Blood Updated: 11/15/23 1216     WBC 8.45 10*3/mm3      RBC 3.48 10*6/mm3      Hemoglobin 11.0 g/dL      Hematocrit 34.8 %      .0 fL      MCH 31.6 pg      MCHC 31.6 g/dL      RDW 13.9 %      RDW-SD 50.4 fl      MPV 11.0 fL      Platelets 247 10*3/mm3      Neutrophil % 76.7 %      Lymphocyte % 13.7 %      Monocyte % 7.3 %       Eosinophil % 1.2 %      Basophil % 0.6 %      Immature Grans % 0.5 %      Neutrophils, Absolute 6.48 10*3/mm3      Lymphocytes, Absolute 1.16 10*3/mm3      Monocytes, Absolute 0.62 10*3/mm3      Eosinophils, Absolute 0.10 10*3/mm3      Basophils, Absolute 0.05 10*3/mm3      Immature Grans, Absolute 0.04 10*3/mm3      nRBC 0.0 /100 WBC             Radiology:   Imaging Results (Last 72 Hours)       Procedure Component Value Units Date/Time    US Arterial Doppler Lower Extremity Complete [[id number]] Collected: 11/15/23 1616     Updated: 11/15/23 1621    Narrative:         History: PAD     Comments: Grayscale imaging as well as color flow duplex were used to  evaluate bilateral lower extremity arterial systems.     On the right, the peak systolic velocity in the common femoral artery  measured 240.6 cm/s. In the profunda femoris artery measured 200.7 cm/s.  The entire SFA and popliteal artery are occluded. The posterior tibial  artery is also occluded. In the anterior tibial artery measured 63.3  cm/s.     On the left, the peak systolic velocity in the common femoral artery  measured 39.1 cm/s. In the profunda femoris artery measured 64 cm/s. In  the proximal SFA measured 21.2 cm/s. The mid to distal SFA and popliteal  artery are occluded. In the posterior tibial artery measured 13 cm/s. In  the anterior tibial artery measured 32.4 cm/s.       Impression:      Chronic bilateral SFA and popliteal artery occlusions. There  is reconstitution of flow in both tibial vessels in the lower  extremities.     This report was signed and finalized on 11/15/2023 4:18 PM CST by Dr. Kwame Villagomez MD.       XR Toe 2+ View Left [[id number]] Collected: 11/15/23 1306     Updated: 11/15/23 1311    Narrative:      EXAMINATION: XR TOE 2+ VW LEFT- 11/15/2023 1:06 PM CST     HISTORY: Concern for osteomyelitis left fourth toe from possible dry  gangrene.     REPORT: 3 views of the left toes were obtained.     COMPARISON: There are no  "correlative imaging studies for comparison.     There is diffuse osteopenia, no fracture is identified. No bony  destruction is identified and the joint spaces are preserved. There is  moderate overgrowth of the first metatarsal head with bunion formation.  Vascular calcifications are present.       Impression:      No acute osseous abnormality, no evidence of osteomyelitis.  There is diffuse osteopenia.     This report was signed and finalized on 11/15/2023 1:08 PM CST by Dr. Rafael Amor MD.               Culture:  No components found for: \"WOUNDCUL\", \"3\"  No components found for: \"CSFCUL\", \"3\"  No components found for: \"BC\", \"3\"  No components found for: \"URINECUL\", \"3\"      Assessment   -End-stage renal disease  -Type 2 diabetes with renal disease  -Hypertension  -Peripheral vascular disease  -Ischemic changes to lower extremities  -Anemia of chronic kidney disease    Plan:  Patient is on intravenous heparin.  Follow up with vascular surgery.  We will continue dialysis thrice weekly.  We will schedule next dialysis in a.m.  Follow-up labs.        Michael Wright MD  11/16/2023  12:08 CST  "

## 2023-11-16 NOTE — PROGRESS NOTES
[facility] Medicine Services  INPATIENT PROGRESS NOTE    Patient Name: Harmony Montoya  Date of Admission: 11/15/2023  Today's Date: 11/16/23  Length of Stay: 1  Primary Care Physician: Provider, No Known    Subjective   Chief Complaint: Left leg pain  HPI   Patient is confused and pulling IVs after HR today. Has received Ativan with partial effect.     Review of Systems   All pertinent negatives and positives are as above. All other systems have been reviewed and are negative unless otherwise stated.     Objective    Temp:  [98 °F (36.7 °C)-99 °F (37.2 °C)] 98.3 °F (36.8 °C)  Heart Rate:  [] 68  Resp:  [14-16] 16  BP: (133-184)/(53-59) 184/56  Physical Exam  Vitals reviewed.   Constitutional:       General: She is not in acute distress.     Appearance: She is well-developed. She is not toxic-appearing.   HENT:      Head: Normocephalic and atraumatic.      Right Ear: External ear normal.      Left Ear: External ear normal.      Mouth/Throat:      Mouth: Mucous membranes are dry.      Pharynx: Oropharynx is clear.   Eyes:      General:         Right eye: No discharge.         Left eye: No discharge.      Extraocular Movements: Extraocular movements intact.      Conjunctiva/sclera: Conjunctivae normal.      Pupils: Pupils are equal, round, and reactive to light.   Neck:      Vascular: No JVD.   Cardiovascular:      Rate and Rhythm: Normal rate and regular rhythm.      Heart sounds: Normal heart sounds.   Pulmonary:      Effort: Pulmonary effort is normal. No respiratory distress.      Breath sounds: No stridor. No wheezing, rhonchi or rales.   Chest:      Chest wall: No tenderness.   Abdominal:      General: Bowel sounds are normal. There is no distension.      Palpations: Abdomen is soft.      Tenderness: There is no abdominal tenderness. There is no guarding or rebound.      Hernia: No hernia is present.   Musculoskeletal:         General: No swelling, tenderness or  "deformity. Normal range of motion.      Cervical back: Normal range of motion and neck supple. No rigidity or tenderness. No muscular tenderness.      Right lower leg: No edema.      Left lower leg: No edema.   Skin:     General: Skin is dry.      Findings: No erythema or rash.      Comments: Distal cyanosis and atrophy legs L>R   Neurological:      General: No focal deficit present.      Mental Status: She is alert. She is disoriented.      Cranial Nerves: No cranial nerve deficit.      Sensory: No sensory deficit.      Motor: No weakness or abnormal muscle tone.      Deep Tendon Reflexes: Reflexes normal.     Results Review:  I have reviewed the labs, radiology results, and diagnostic studies.    Laboratory Data:   Results from last 7 days   Lab Units 11/16/23  0627 11/15/23  1158   WBC 10*3/mm3 6.61 8.45   HEMOGLOBIN g/dL 9.7* 11.0*   HEMATOCRIT % 31.8* 34.8   PLATELETS 10*3/mm3 217 247        Results from last 7 days   Lab Units 11/16/23  0627 11/15/23  1158   SODIUM mmol/L 142 141   POTASSIUM mmol/L 4.7 4.2   CHLORIDE mmol/L 106 105   CO2 mmol/L 24.0 25.0   BUN mg/dL 51* 49*   CREATININE mg/dL 4.46* 4.31*   CALCIUM mg/dL 9.0 9.5   BILIRUBIN mg/dL  --  0.3   ALK PHOS U/L  --  130*   ALT (SGPT) U/L  --  7   AST (SGOT) U/L  --  12   GLUCOSE mg/dL 154* 139*       Culture Data:   No results found for: \"BLOODCX\", \"URINECX\", \"WOUNDCX\", \"MRSACX\", \"RESPCX\", \"STOOLCX\"    Radiology Data:   Imaging Results (Last 24 Hours)       Procedure Component Value Units Date/Time    US Arterial Doppler Lower Extremity Complete [[id number]] Collected: 11/15/23 1616     Updated: 11/15/23 1621    Narrative:         History: PAD     Comments: Grayscale imaging as well as color flow duplex were used to  evaluate bilateral lower extremity arterial systems.     On the right, the peak systolic velocity in the common femoral artery  measured 240.6 cm/s. In the profunda femoris artery measured 200.7 cm/s.  The entire SFA and popliteal artery " are occluded. The posterior tibial  artery is also occluded. In the anterior tibial artery measured 63.3  cm/s.     On the left, the peak systolic velocity in the common femoral artery  measured 39.1 cm/s. In the profunda femoris artery measured 64 cm/s. In  the proximal SFA measured 21.2 cm/s. The mid to distal SFA and popliteal  artery are occluded. In the posterior tibial artery measured 13 cm/s. In  the anterior tibial artery measured 32.4 cm/s.       Impression:      Chronic bilateral SFA and popliteal artery occlusions. There  is reconstitution of flow in both tibial vessels in the lower  extremities.     This report was signed and finalized on 11/15/2023 4:18 PM CST by Dr. Kwame Villagomez MD.       XR Toe 2+ View Left [[id number]] Collected: 11/15/23 1306     Updated: 11/15/23 1311    Narrative:      EXAMINATION: XR TOE 2+ VW LEFT- 11/15/2023 1:06 PM CST     HISTORY: Concern for osteomyelitis left fourth toe from possible dry  gangrene.     REPORT: 3 views of the left toes were obtained.     COMPARISON: There are no correlative imaging studies for comparison.     There is diffuse osteopenia, no fracture is identified. No bony  destruction is identified and the joint spaces are preserved. There is  moderate overgrowth of the first metatarsal head with bunion formation.  Vascular calcifications are present.       Impression:      No acute osseous abnormality, no evidence of osteomyelitis.  There is diffuse osteopenia.     This report was signed and finalized on 11/15/2023 1:08 PM CST by Dr. Rafael Amor MD.               I have reviewed the patient's current medications.     Assessment/Plan   Assessment  Active Hospital Problems    Diagnosis     **Ischemic rest pain of lower extremity     End stage renal disease on dialysis     Type 2 diabetes mellitus, with long-term current use of insulin     Primary hypertension     Coronary artery disease involving native coronary artery of native heart without angina  pectoris     Chronic GERD    Acute delirium    Treatment Plan  Patient delirious this afternoon, at risk of falls and bleeding. Will place in critical care for safety.   Ativan IV prn agitation  Vitals per protocol  Diet cardiac ADA  Up with assistance, fall precautions  Pain control > Tylenol 650 > Lortab 5 > Dilaudid 0.5 IV q4h prn  Heparin drip protocol D for renal failure  Vascular surgery consult     ESRD > Nephrology consult in place     DM > Hold Levemir  Check A1C, lipid panel  Humalog low dose sliding scale     HTN > Continue home medications Lisinopril     DVT prophylaxis > Patient currently fully anticoagulated    Medical Decision Making  Number and Complexity of problems: 4 complex medical problems  Differential Diagnosis: none     Conditions and Status        Condition is unchanged.     [facility] Data  External documents reviewed: [employer] EHR, care everywhere records  Cardiac tracing (EKG, telemetry) interpretation: EKG pending  Radiology interpretation: See above  Labs reviewed: See above  Any tests that were considered but not ordered: none     Decision rules/scores evaluated (example [id number], Wells, etc): N/A     Discussed with: Patient and nurse Flavio     Care Planning  Shared decision making: Patient  Code status and discussions: Full code     Disposition  Social Determinants of Health that impact treatment or disposition: none yet  I expect the patient to be discharged to home in 2-4 days.     Electronically signed by Salvatore Miller MD, 11/16/23, 16:42 CST.

## 2023-11-17 ENCOUNTER — ANESTHESIA EVENT (OUTPATIENT)
Dept: ICU | Facility: HOSPITAL | Age: 73
End: 2023-11-17

## 2023-11-17 ENCOUNTER — ANESTHESIA (OUTPATIENT)
Dept: ICU | Facility: HOSPITAL | Age: 73
End: 2023-11-17

## 2023-11-17 ENCOUNTER — APPOINTMENT (OUTPATIENT)
Dept: INTERVENTIONAL RADIOLOGY/VASCULAR | Facility: HOSPITAL | Age: 73
DRG: 299 | End: 2023-11-17
Payer: OTHER GOVERNMENT

## 2023-11-17 LAB
ANION GAP SERPL CALCULATED.3IONS-SCNC: 16 MMOL/L (ref 5–15)
APTT PPP: 31.1 SECONDS (ref 24.5–36)
APTT PPP: 46.5 SECONDS (ref 24.5–36)
APTT PPP: 53.2 SECONDS (ref 24.5–36)
APTT PPP: NORMAL S
BASOPHILS # BLD AUTO: 0.04 10*3/MM3 (ref 0–0.2)
BASOPHILS NFR BLD AUTO: 0.5 % (ref 0–1.5)
BUN SERPL-MCNC: 26 MG/DL (ref 8–23)
BUN/CREAT SERPL: 9.3 (ref 7–25)
CALCIUM SPEC-SCNC: 7.9 MG/DL (ref 8.6–10.5)
CHLORIDE SERPL-SCNC: 95 MMOL/L (ref 98–107)
CO2 SERPL-SCNC: 24 MMOL/L (ref 22–29)
CREAT SERPL-MCNC: 2.8 MG/DL (ref 0.57–1)
DEPRECATED RDW RBC AUTO: 50.1 FL (ref 37–54)
EGFRCR SERPLBLD CKD-EPI 2021: 17.3 ML/MIN/1.73
EOSINOPHIL # BLD AUTO: 0.01 10*3/MM3 (ref 0–0.4)
EOSINOPHIL NFR BLD AUTO: 0.1 % (ref 0.3–6.2)
ERYTHROCYTE [DISTWIDTH] IN BLOOD BY AUTOMATED COUNT: 13.5 % (ref 12.3–15.4)
GLUCOSE SERPL-MCNC: 211 MG/DL (ref 65–99)
HCT VFR BLD AUTO: 33.5 % (ref 34–46.6)
HGB BLD-MCNC: 10.4 G/DL (ref 12–15.9)
IMM GRANULOCYTES # BLD AUTO: 0.03 10*3/MM3 (ref 0–0.05)
IMM GRANULOCYTES NFR BLD AUTO: 0.4 % (ref 0–0.5)
LYMPHOCYTES # BLD AUTO: 0.75 10*3/MM3 (ref 0.7–3.1)
LYMPHOCYTES NFR BLD AUTO: 9.5 % (ref 19.6–45.3)
MCH RBC QN AUTO: 31 PG (ref 26.6–33)
MCHC RBC AUTO-ENTMCNC: 31 G/DL (ref 31.5–35.7)
MCV RBC AUTO: 100 FL (ref 79–97)
MONOCYTES # BLD AUTO: 0.51 10*3/MM3 (ref 0.1–0.9)
MONOCYTES NFR BLD AUTO: 6.4 % (ref 5–12)
NEUTROPHILS NFR BLD AUTO: 6.57 10*3/MM3 (ref 1.7–7)
NEUTROPHILS NFR BLD AUTO: 83.1 % (ref 42.7–76)
NRBC BLD AUTO-RTO: 0 /100 WBC (ref 0–0.2)
PLATELET # BLD AUTO: 217 10*3/MM3 (ref 140–450)
PMV BLD AUTO: 11.1 FL (ref 6–12)
POTASSIUM SERPL-SCNC: 3.9 MMOL/L (ref 3.5–5.2)
RBC # BLD AUTO: 3.35 10*6/MM3 (ref 3.77–5.28)
SODIUM SERPL-SCNC: 135 MMOL/L (ref 136–145)
WBC NRBC COR # BLD AUTO: 7.91 10*3/MM3 (ref 3.4–10.8)

## 2023-11-17 PROCEDURE — 36415 COLL VENOUS BLD VENIPUNCTURE: CPT | Performed by: FAMILY MEDICINE

## 2023-11-17 PROCEDURE — 85730 THROMBOPLASTIN TIME PARTIAL: CPT | Performed by: FAMILY MEDICINE

## 2023-11-17 PROCEDURE — G0463 HOSPITAL OUTPT CLINIC VISIT: HCPCS | Performed by: SURGERY

## 2023-11-17 PROCEDURE — 25010000002 HYDRALAZINE PER 20 MG: Performed by: HOSPITALIST

## 2023-11-17 PROCEDURE — 25010000002 HEPARIN (PORCINE) 25000-0.45 UT/250ML-% SOLUTION: Performed by: FAMILY MEDICINE

## 2023-11-17 PROCEDURE — 85025 COMPLETE CBC W/AUTO DIFF WBC: CPT | Performed by: FAMILY MEDICINE

## 2023-11-17 PROCEDURE — 25010000002 LABETALOL 5 MG/ML SOLUTION: Performed by: HOSPITALIST

## 2023-11-17 PROCEDURE — 25010000002 HEPARIN (PORCINE) PER 1000 UNITS: Performed by: FAMILY MEDICINE

## 2023-11-17 PROCEDURE — 80048 BASIC METABOLIC PNL TOTAL CA: CPT | Performed by: FAMILY MEDICINE

## 2023-11-17 PROCEDURE — 25810000003 SODIUM CHLORIDE 0.9 % SOLUTION: Performed by: HOSPITALIST

## 2023-11-17 PROCEDURE — 25010000002 HYDROMORPHONE PER 4 MG: Performed by: FAMILY MEDICINE

## 2023-11-17 PROCEDURE — 99222 1ST HOSP IP/OBS MODERATE 55: CPT | Performed by: SURGERY

## 2023-11-17 RX ORDER — LABETALOL HYDROCHLORIDE 5 MG/ML
10 INJECTION, SOLUTION INTRAVENOUS EVERY 4 HOURS PRN
Status: DISCONTINUED | OUTPATIENT
Start: 2023-11-17 | End: 2023-11-24 | Stop reason: HOSPADM

## 2023-11-17 RX ORDER — CEFAZOLIN SODIUM 1 G/50ML
1000 INJECTION, SOLUTION INTRAVENOUS ONCE
Status: DISCONTINUED | OUTPATIENT
Start: 2023-11-17 | End: 2023-11-20

## 2023-11-17 RX ORDER — HYDRALAZINE HYDROCHLORIDE 20 MG/ML
10 INJECTION INTRAMUSCULAR; INTRAVENOUS EVERY 6 HOURS PRN
Status: DISCONTINUED | OUTPATIENT
Start: 2023-11-17 | End: 2023-11-24 | Stop reason: HOSPADM

## 2023-11-17 RX ADMIN — SODIUM CHLORIDE 5 MG/HR: 9 INJECTION, SOLUTION INTRAVENOUS at 07:57

## 2023-11-17 RX ADMIN — HEPARIN SODIUM 3400 UNITS: 1000 INJECTION, SOLUTION INTRAVENOUS; SUBCUTANEOUS at 20:56

## 2023-11-17 RX ADMIN — HYDRALAZINE HYDROCHLORIDE 10 MG: 20 INJECTION INTRAMUSCULAR; INTRAVENOUS at 01:10

## 2023-11-17 RX ADMIN — HYDROCODONE BITARTRATE AND ACETAMINOPHEN 1 TABLET: 5; 325 TABLET ORAL at 18:49

## 2023-11-17 RX ADMIN — Medication 10 ML: at 21:00

## 2023-11-17 RX ADMIN — HYDROMORPHONE HYDROCHLORIDE 0.5 MG: 1 INJECTION, SOLUTION INTRAMUSCULAR; INTRAVENOUS; SUBCUTANEOUS at 15:48

## 2023-11-17 RX ADMIN — HEPARIN SODIUM 12 UNITS/KG/HR: 10000 INJECTION, SOLUTION INTRAVENOUS at 20:56

## 2023-11-17 RX ADMIN — LABETALOL HYDROCHLORIDE 10 MG: 5 INJECTION INTRAVENOUS at 03:53

## 2023-11-17 RX ADMIN — LISINOPRIL 2.5 MG: 2.5 TABLET ORAL at 10:12

## 2023-11-17 RX ADMIN — Medication 10 ML: at 10:12

## 2023-11-17 RX ADMIN — HEPARIN SODIUM 13 UNITS/KG/HR: 10000 INJECTION, SOLUTION INTRAVENOUS at 00:05

## 2023-11-17 RX ADMIN — ASPIRIN 81 MG: 81 TABLET, CHEWABLE ORAL at 18:49

## 2023-11-17 RX ADMIN — HEPARIN SODIUM 3400 UNITS: 1000 INJECTION, SOLUTION INTRAVENOUS; SUBCUTANEOUS at 01:13

## 2023-11-17 NOTE — PROGRESS NOTES
Nephrology ([facility]) Progress Note      Patient:  Harmony Montoya  YOB: 1950  Date of Service: 11/17/2023  MRN: [id number]   Acct: [id number]   Primary Care Physician: Provider, No Known  Advance Directive:   Code Status and Medical Interventions:   Ordered at: 11/15/23 4556     Code Status (Patient has no pulse and is not breathing):    CPR (Attempt to Resuscitate)     Medical Interventions (Patient has pulse or is breathing):    Full Support     Admit Date: 11/15/2023       Hospital Day: 2  Referring Provider: No ref. provider found      Patient personally seen and examined.  Complete chart including Consults, Notes, Operative Reports, Labs, Cardiology, and Radiology studies reviewed as able.        Subjective:  Harmony Montoya is a 73 y.o. female for whom we were consulted for evaluation and treatment of end stage renal disease. Maintenance hemodialysis Monday Wednesday Friday at [facility]. Missed HD on 11/15. History of type 2 diabetes and hypertension. Presented with pain in left foot. Admitted for vascular evaluation. Underwent make-up dialysis on 11/16. Tolerated treatment well but later developed some confusion and was moved to CCU overnight.    Today is awake, oriented. Overnight events noted. Planning for angiogram today.    Allergies:  Statins    Home Meds:  Medications Prior to Admission   Medication Sig Dispense Refill Last Dose    Ascorbic Acid (Vitamin C) 500 MG capsule Take 2 capsules by mouth Daily.   11/15/2023    aspirin 81 MG chewable tablet Chew 1 tablet Daily.   11/14/2023    carvedilol (COREG) 25 MG tablet Take 1 tablet by mouth 2 (Two) Times a Day With Meals.   11/15/2023    clopidogrel (PLAVIX) 75 MG tablet Take 1 tablet by mouth Daily.   11/14/2023    diphenhydrAMINE (BENADRYL) 25 mg capsule Take 1 capsule by mouth Daily.   11/14/2023    insulin glargine (LANTUS, SEMGLEE) 100 UNIT/ML injection Inject 7 Units under the skin into the  appropriate area as directed Daily.   11/15/2023    lisinopril (PRINIVIL,ZESTRIL) 20 MG tablet Take 0.5 tablets by mouth Daily.   11/15/2023    omeprazole (priLOSEC) 40 MG capsule Take 1 capsule by mouth Daily.   11/15/2023    vitamin D3 125 MCG (5000 UT) capsule capsule Take 1 capsule by mouth Daily.   11/15/2023    insulin regular (humuLIN R,novoLIN R) 100 UNIT/ML injection Inject 6 Units under the skin into the appropriate area as directed Daily Before Supper.       Polyvinyl Alcohol-Povidone PF (HYPOTEARS) 1.4-0.6 % ophthalmic solution Administer 1 drop to both eyes 4 (Four) Times a Day.          Medicines:  Current Facility-Administered Medications   Medication Dose Route Frequency Provider Last Rate Last Admin    acetaminophen (TYLENOL) tablet 650 mg  650 mg Oral Q4H PRN Salvatore Miller MD   650 mg at 11/16/23 0310    aspirin chewable tablet 81 mg  81 mg Oral Daily Salvatore Miller MD   81 mg at 11/16/23 0812    sennosides-docusate (PERICOLACE) 8.6-50 MG per tablet 2 tablet  2 tablet Oral BID Salvatore Miller MD   2 tablet at 11/16/23 2059    And    polyethylene glycol (MIRALAX) packet 17 g  17 g Oral Daily PRN Salvatore Miller MD        And    bisacodyl (DULCOLAX) EC tablet 5 mg  5 mg Oral Daily PRN Salvatore Miller MD        And    bisacodyl (DULCOLAX) suppository 10 mg  10 mg Rectal Daily PRN Salvatore Miller MD        heparin (porcine) injection 1,700 Units  30 Units/kg Intravenous PRN Salvatore Miller MD        heparin (porcine) injection 3,400 Units  60 Units/kg Intravenous PRN Salvatore Miller MD   3,400 Units at 11/17/23 0113    heparin 45230 units/250 mL (100 units/mL) in 0.45 % NaCl infusion  12 Units/kg/hr Intravenous Titrated Salvatore Miller MD 8.5 mL/hr at 11/17/23 0116 15 Units/kg/hr at 11/17/23 0116    hydrALAZINE (APRESOLINE) injection 10 mg  10 mg Intravenous Q6H PRN Jane Armstrong MD   10 mg at 11/17/23 0110     HYDROcodone-acetaminophen (NORCO) 5-325 MG per tablet 1 tablet  1 tablet Oral Q4H PRN Salvatore Miller MD   1 tablet at 23 1827    HYDROmorphone (DILAUDID) injection 0.5 mg  0.5 mg Intravenous Q4H PRN Salvatore Miller MD   0.5 mg at 23 1027    And    naloxone (NARCAN) injection 0.4 mg  0.4 mg Intravenous Q5 Min PRN Salvatore Miller MD        labetalol (NORMODYNE,TRANDATE) injection 10 mg  10 mg Intravenous Q4H PRN Jane Armstrong MD   10 mg at 23 0353    lisinopril (PRINIVIL,ZESTRIL) tablet 2.5 mg  2.5 mg Oral BID Salvatore Miller MD   2.5 mg at 23    niCARdipine (CARDENE) 25 mg in 250 mL NS infusion kit  5-15 mg/hr Intravenous Titrated Jane Armstrong MD 75 mL/hr at 23 0853 7.5 mg/hr at 23 0853    nitroglycerin (NITROSTAT) SL tablet 0.4 mg  0.4 mg Sublingual Q5 Min PRN Salvatore Miller MD        ondansetron (ZOFRAN) tablet 4 mg  4 mg Oral Q6H PRN Salvatore Miller MD        pantoprazole (PROTONIX) EC tablet 40 mg  40 mg Oral Q AM Salvatore Miller MD   40 mg at 23 0553    sodium chloride 0.9 % flush 10 mL  10 mL Intravenous Q12H Salvatore Miller MD   10 mL at 23    sodium chloride 0.9 % flush 10 mL  10 mL Intravenous PRN Salvatore Miller MD        sodium chloride 0.9 % infusion 40 mL  40 mL Intravenous PRN Salvatore Miller MD           Past Medical History:  History reviewed. No pertinent past medical history.    Past Surgical History:  History reviewed. No pertinent surgical history.    Family History  History reviewed. No pertinent family history.    Social History  Social History     Socioeconomic History    Marital status: Single   Tobacco Use    Smoking status: Former     Types: Cigarettes     Quit date: 2021     Years since quittin.5    Smokeless tobacco: Never   Vaping Use    Vaping Use: Never used   Substance and Sexual Activity    Alcohol use: Never    Drug  use: Never       Review of Systems:  History obtained from chart review and the patient  General ROS: No fever or chills  Respiratory ROS: No cough, shortness of breath, wheezing  Cardiovascular ROS: No chest pain or palpitations  Gastrointestinal ROS: No abdominal pain or melena  Genito-Urinary ROS: No dysuria or hematuria  Psych ROS: No anxiety and depression  14 point ROS reviewed with the patient and negative except as noted above and in the HPI unless unable to obtain.    Objective:  Patient Vitals for the past 24 hrs:   BP Temp Temp src Pulse Resp SpO2   11/17/23 0900 177/83 -- -- 86 -- 99 %   11/17/23 0845 161/66 -- -- 84 -- 100 %   11/17/23 0830 160/63 -- -- 84 -- 98 %   11/17/23 0815 (!) 181/61 -- -- 84 -- 97 %   11/17/23 0730 -- 98.4 °F (36.9 °C) Oral -- -- --   11/17/23 0700 171/66 -- -- 82 15 --   11/17/23 0600 170/69 -- -- 76 -- --   11/17/23 0500 173/70 -- -- 79 -- --   11/17/23 0400 176/62 98.5 °F (36.9 °C) Axillary 75 -- --   11/17/23 0353 (!) 182/95 -- -- 85 -- --   11/17/23 0300 (!) 192/68 -- -- 90 -- --   11/17/23 0200 150/90 -- -- 89 -- --   11/17/23 0110 (!) 156/144 -- -- 88 -- --   11/17/23 0100 -- -- -- 87 -- --   11/17/23 0000 (!) 194/77 98.6 °F (37 °C) Axillary 87 -- --   11/16/23 2300 (!) 184/169 -- -- 84 -- --   11/16/23 2200 173/87 -- -- 85 -- --   11/16/23 2100 (!) 197/70 -- -- 84 -- --   11/16/23 2000 -- 99 °F (37.2 °C) Axillary 86 -- --   11/16/23 1943 -- -- -- 75 18 99 %   11/16/23 1900 172/92 -- -- 79 -- 94 %   11/16/23 1830 (!) 192/65 -- -- 80 -- 100 %   11/16/23 1812 -- 100.3 °F (37.9 °C) Axillary -- -- --   11/16/23 1547 173/59 98.3 °F (36.8 °C) Oral 69 16 96 %   11/16/23 1336 164/72 97.3 °F (36.3 °C) Axillary 78 16 100 %       Intake/Output Summary (Last 24 hours) at 11/17/2023 0919  Last data filed at 11/17/2023 0731  Gross per 24 hour   Intake 1496 ml   Output 25 ml   Net 1471 ml     General: awake/alert   Chest:  clear to auscultation bilaterally without respiratory  distress  CVS: regular rate and rhythm  Abdominal: soft, nontender, positive bowel sounds  Extremities: no cyanosis or edema  Skin: warm and dry without rash      Labs:  Results from last 7 days   Lab Units 11/17/23  0825 11/16/23  0627 11/15/23  1158   WBC 10*3/mm3 7.91 6.61 8.45   HEMOGLOBIN g/dL 10.4* 9.7* 11.0*   HEMATOCRIT % 33.5* 31.8* 34.8   PLATELETS 10*3/mm3 217 217 247         Results from last 7 days   Lab Units 11/16/23  0627 11/15/23  1158   SODIUM mmol/L 142 141   POTASSIUM mmol/L 4.7 4.2   CHLORIDE mmol/L 106 105   CO2 mmol/L 24.0 25.0   BUN mg/dL 51* 49*   CREATININE mg/dL 4.46* 4.31*   CALCIUM mg/dL 9.0 9.5   EGFR mL/min/1.73 9.9* 10.3*   BILIRUBIN mg/dL  --  0.3   ALK PHOS U/L  --  130*   ALT (SGPT) U/L  --  7   AST (SGOT) U/L  --  12   GLUCOSE mg/dL 154* 139*       Radiology:   Imaging Results (Last 72 Hours)       Procedure Component Value Units Date/Time    US Arterial Doppler Lower Extremity Complete [[id number]] Collected: 11/15/23 1616     Updated: 11/15/23 1621    Narrative:         History: PAD     Comments: Grayscale imaging as well as color flow duplex were used to  evaluate bilateral lower extremity arterial systems.     On the right, the peak systolic velocity in the common femoral artery  measured 240.6 cm/s. In the profunda femoris artery measured 200.7 cm/s.  The entire SFA and popliteal artery are occluded. The posterior tibial  artery is also occluded. In the anterior tibial artery measured 63.3  cm/s.     On the left, the peak systolic velocity in the common femoral artery  measured 39.1 cm/s. In the profunda femoris artery measured 64 cm/s. In  the proximal SFA measured 21.2 cm/s. The mid to distal SFA and popliteal  artery are occluded. In the posterior tibial artery measured 13 cm/s. In  the anterior tibial artery measured 32.4 cm/s.       Impression:      Chronic bilateral SFA and popliteal artery occlusions. There  is reconstitution of flow in both tibial vessels in the  lower  extremities.     This report was signed and finalized on 11/15/2023 4:18 PM CST by Dr. Kwame Villagomez MD.       XR Toe 2+ View Left [[id number]] Collected: 11/15/23 1306     Updated: 11/15/23 1311    Narrative:      EXAMINATION: XR TOE 2+ VW LEFT- 11/15/2023 1:06 PM CST     HISTORY: Concern for osteomyelitis left fourth toe from possible dry  gangrene.     REPORT: 3 views of the left toes were obtained.     COMPARISON: There are no correlative imaging studies for comparison.     There is diffuse osteopenia, no fracture is identified. No bony  destruction is identified and the joint spaces are preserved. There is  moderate overgrowth of the first metatarsal head with bunion formation.  Vascular calcifications are present.       Impression:      No acute osseous abnormality, no evidence of osteomyelitis.  There is diffuse osteopenia.     This report was signed and finalized on 11/15/2023 1:08 PM CST by Dr. Rafael Amor MD.               Culture:  Blood Culture   Date Value Ref Range Status   11/15/2023 No growth at 24 hours  Preliminary   11/15/2023 No growth at 24 hours  Preliminary         Assessment    End stage renal disease on HD MWF  Type 2 diabetes  Hypertension  Peripheral vascular disease  Anemia of CKD  Acute delirium--improving    Plan:  Angiogram today  Dialysis today (after angiogram)  Monitor labs      HEMANT Fuller  11/17/2023  09:19 CST

## 2023-11-17 NOTE — CONSULTS
"Harmony Montoya  [id number]  [id number]  [id number]/  Paul Salvatorejennifer Novak,*  11/15/2023    Chief Complaint   Patient presents with    Toe Injury       HPI: Harmony Montoya is a 73 y.o. female who has a PMH of ESRD, DM 2 ID, CAD, HTN, that presents to the ER with complaints of pain in left leg and inability to walk. She has had pain for about 5 weeks and has required help to walk due to pain. Yesterday she visited with Dr Juan Miguel Iraheta, general surgeon who wrote: \"I cannot feel for pulse due to severe pain of the foot.  She is on dialysis and dialyzes through a left AV fistula.  We have been consulted for evaluation of her left.    PMH:  End-stage renal disease  Diabetes type 2  Coronary artery disease  Hypertension    PSH:  Catheter insertion  Left AV fistula creation    History reviewed. No pertinent family history.    Social History     Socioeconomic History    Marital status: Single   Tobacco Use    Smoking status: Former     Types: Cigarettes     Quit date: 2021     Years since quittin.5    Smokeless tobacco: Never   Vaping Use    Vaping Use: Never used   Substance and Sexual Activity    Alcohol use: Never    Drug use: Never       Allergies   Allergen Reactions    Statins Swelling       Hospital Medications (active)         Dose Frequency Start End    acetaminophen (TYLENOL) tablet 650 mg 650 mg Every 4 Hours PRN 11/15/2023 --    Admin Instructions: If given for fever, use fever parameter: fever greater than 100.4 °F  Based on patient request - if ordered for moderate or severe pain, provider allows for administration of a medication prescribed for a lower pain scale.    Do not exceed 4 grams of acetaminophen in a 24 hr period. Max dose of 2gm for AST/ALT greater than 120 units/L.    If given for pain, use the following pain scale:   Mild Pain = Pain Score of 1-3, CPOT 1-2  Moderate Pain = Pain Score of 4-6, CPOT 3-4  Severe Pain = Pain Score of 7-10, CPOT 5-8    Route: Oral    aspirin chewable tablet 81 mg " 81 mg Daily 11/16/2023 --    Admin Instructions: Herbal/drug interaction: Avoid use with ginkgo biloba. Based on patient request - if ordered for moderate or severe pain, provider allows for administration of a medication prescribed for a lower pain scale.  Do not exceed 4 grams of aspirin in a 24 hr period.    If given for pain, use the following pain scale:   Mild Pain = Pain Score of 1-3, CPOT 1-2  Moderate Pain = Pain Score of 4-6, CPOT 3-4  Severe Pain = Pain Score of 7-10, CPOT 5-8    Route: Oral    bisacodyl (DULCOLAX) EC tablet 5 mg 5 mg Daily PRN 11/15/2023 --    Admin Instructions: Use if no bowel movement after 12 hours.  Swallow whole. Do not crush, split, or chew tablet.    Route: Oral    Linked Group 1: See Hyperspace for full Linked Orders Report.        bisacodyl (DULCOLAX) suppository 10 mg 10 mg Daily PRN 11/15/2023 --    Admin Instructions: Use if no bowel movement after 12 hours.  Hold for diarrhea    Route: Rectal    Linked Group 1: See Hyperspace for full Linked Orders Report.        heparin (porcine) injection 1,700 Units 30 Units/kg × 56.7 kg As Needed 11/15/2023 --    Admin Instructions: Bolus for aPTT 58-67 seconds.  Maximum dose 2,000 units.    Route: Intravenous    heparin (porcine) injection 3,400 Units 60 Units/kg × 56.7 kg As Needed 11/15/2023 --    Admin Instructions: Bolus for aPTT 57 seconds or less.  Maximum dose 4,000 units.    Route: Intravenous    heparin 93667 units/250 mL (100 units/mL) in 0.45 % NaCl infusion 12 Units/kg/hr × 56.7 kg Titrated 11/15/2023 --    Admin Instructions: Protocol D:  Weight based heparin nomogram     Maximum initial infusion rate 1000 units/hr.     aPTT 57 sec and below: Bolus same as initial bolus. Increase rate by 2 mL/hr (200 units/hr). Next aPTT in 6 hours.   aPTT 58-67 sec: Bolus 1/2 initial bolus. Increase rate by 1 mL/hr (100 units/hr). Next aPTT in 6 hours.   aPTT 68-84 sec: No change. Next aPTT in 6 hours.   aPTT 85-93 sec: Hold infusion 30  minutes. Decrease rate by 1 mL/hr (100 units/hr). Next aPTT in 6 hours.   aPTT 94 sec and above: Hold infusion 60 minutes. Decrease rate by 2 mL/hr (200 units/hr). Next aPTT in 6 hours.    Route: Intravenous    hydrALAZINE (APRESOLINE) injection 10 mg 10 mg Every 6 Hours PRN 11/17/2023 --    Admin Instructions: Hold for SBP less than 100, DBP less than 60  Caution: Look alike/sound alike drug alert    Route: Intravenous    HYDROcodone-acetaminophen (NORCO) 5-325 MG per tablet 1 tablet 1 tablet Every 4 Hours PRN 11/15/2023 11/22/2023    Admin Instructions: Based on patient request - if ordered for moderate or severe pain, provider allows for administration of a medication prescribed for a lower pain scale.  [GEOVANY]    Do not exceed 4 grams of acetaminophen in a 24 hr period. Max dose of 2gm for AST/ALT greater than 120 units/L        If given for pain, use the following pain scale:   Mild Pain = Pain Score of 1-3, CPOT 1-2  Moderate Pain = Pain Score of 4-6, CPOT 3-4  Severe Pain = Pain Score of 7-10, CPOT 5-8    Route: Oral    HYDROmorphone (DILAUDID) injection 0.5 mg 0.5 mg Every 4 Hours PRN 11/15/2023 11/22/2023    Admin Instructions: Based on patient request - if ordered for moderate or severe pain, provider allows for administration of a medication prescribed for a lower pain scale.  If given for pain, use the following pain scale:  Mild Pain = Pain Score of 1-3, CPOT 1-2  Moderate Pain = Pain Score of 4-6, CPOT 3-4  Severe Pain = Pain Score of 7-10, CPOT 5-8    Route: Intravenous    Linked Group 2: See Hyperspace for full Linked Orders Report.        labetalol (NORMODYNE,TRANDATE) injection 10 mg 10 mg Every 4 Hours PRN 11/17/2023 --    Admin Instructions: As needed for SBP greater than 160  Give IV Push over 2 minutes.    Route: Intravenous    lisinopril (PRINIVIL,ZESTRIL) tablet 2.5 mg 2.5 mg 2 Times Daily 11/15/2023 --    Admin Instructions: Hold for SBP less than 100, DBP less than 60    Route: Oral     naloxone (NARCAN) injection 0.4 mg 0.4 mg Every 5 Minutes PRN 11/15/2023 --    Admin Instructions: If Respiratory Rate Less Than 8 or Patient is Difficult to Arouse, Stop ALL Narcotics & Contact Provider.  Administer Slow IV Push.  Repeat As Ordered Until Respiratory Rate is Greater Than 12.    Route: Intravenous    Linked Group 2: See Phani for full Linked Orders Report.        niCARdipine (CARDENE) 25 mg in 250 mL NS infusion kit 5-15 mg/hr Titrated 11/17/2023 --    Admin Instructions: Initiate infusion at 5 mg/hr and titrate up or down by 2.5 - 5 mg/hr every 15 minutes to use the lowest dose possible to maintain SBP less than 180 mm Hg. Maximum dose = 15 mg/hr. Hold infusion for SBP less than 100 mm Hg. Contact provider if unable to maintain SBP Less Than 180 mm Hg on maximum dose. Once SBP target achieved obtain vitals a minimum of every 30 minutes.   Administer as a slow continuous infusion via central line or through a large peripheral vein. Peripheral venous irritation may be minimized by changing the site of infusion every 12 hours. Nicardipine 0.2mg/mL concentration must be infused via central line ONLY.  Caution: Look alike/sound alike drug alert    Route: Intravenous    nitroglycerin (NITROSTAT) SL tablet 0.4 mg 0.4 mg Every 5 Minutes PRN 11/15/2023 --    Admin Instructions: If Pain Unrelieved After 3 Doses Notify MD  May administer up to 3 doses per episode.    Route: Sublingual    ondansetron (ZOFRAN) tablet 4 mg 4 mg Every 6 Hours PRN 11/15/2023 --    Admin Instructions: If BOTH ondansetron (ZOFRAN) and promethazine (PHENERGAN) are ordered use ondansetron first and THEN promethazine IF ondansetron is ineffective.    Route: Oral    pantoprazole (PROTONIX) EC tablet 40 mg 40 mg Every Early Morning 11/16/2023 --    Admin Instructions: Swallow whole; do not crush, split, or chew.    Route: Oral    polyethylene glycol (MIRALAX) packet 17 g 17 g Daily PRN 11/15/2023 --    Admin Instructions: Use if no  "bowel movement after 12 hours. Mix in 6-8 ounces of water.  Use 4-8 ounces of water, tea, or juice for each 17 gram dose.    Route: Oral    Linked Group 1: See Hyperspace for full Linked Orders Report.        sennosides-docusate (PERICOLACE) 8.6-50 MG per tablet 2 tablet 2 tablet 2 Times Daily 11/15/2023 --    Admin Instructions: HOLD MEDICATION IF PATIENT HAS HAD BOWEL MOVEMENT. Start bowel management regimen if patient has not had a bowel movement after 12 hours.    Route: Oral    Linked Group 1: See Hyperspace for full Linked Orders Report.        sodium chloride 0.9 % flush 10 mL 10 mL Every 12 Hours Scheduled 11/15/2023 --    Route: Intravenous    sodium chloride 0.9 % flush 10 mL 10 mL As Needed 11/15/2023 --    Route: Intravenous    sodium chloride 0.9 % infusion 40 mL 40 mL As Needed 11/15/2023 --    Admin Instructions: Following administration of an IV intermittent medication, flush line with 40mL NS at 100mL/hr.    Route: Intravenous            Review of Systems   Constitutional: Negative.    HENT: Negative.     Eyes: Negative.    Respiratory: Negative.     Cardiovascular: Negative.         Foot pain   Gastrointestinal: Negative.    Endocrine: Negative.    Genitourinary: Negative.    Musculoskeletal: Negative.    Skin:  Positive for color change.   Allergic/Immunologic: Negative.    Neurological: Negative.    Hematological: Negative.    Psychiatric/Behavioral: Negative.         /62   Pulse 83   Temp 98.4 °F (36.9 °C) (Oral)   Resp 14   Ht 160 cm (63\")   Wt 56.7 kg (125 lb)   SpO2 96%   BMI 22.14 kg/m²    Physical Exam  Vitals and nursing note reviewed.   Constitutional:       General: She is not in acute distress.     Appearance: She is well-developed. She is not diaphoretic.   HENT:      Head: Normocephalic and atraumatic.   Eyes:      General: No scleral icterus.     Pupils: Pupils are equal, round, and reactive to light.   Neck:      Thyroid: No thyromegaly.      Vascular: No carotid bruit " or JVD.   Cardiovascular:      Rate and Rhythm: Normal rate and regular rhythm.      Pulses: Normal pulses.           Dorsalis pedis pulses are detected w/ Doppler on the right side and detected w/ Doppler on the left side.        Posterior tibial pulses are detected w/ Doppler on the left side.      Heart sounds: Normal heart sounds and S2 normal. No murmur heard.     No friction rub. No gallop.      Comments: Weak Doppler signals  Pulmonary:      Effort: Pulmonary effort is normal.      Breath sounds: Normal breath sounds.   Abdominal:      General: Bowel sounds are normal.      Palpations: Abdomen is soft.   Musculoskeletal:      Cervical back: Normal range of motion and neck supple.   Skin:     Comments: Discoloration to the left lower extremity, toe necrotic   Neurological:      Mental Status: She is alert and oriented to person, place, and time.      Cranial Nerves: No cranial nerve deficit.   Psychiatric:         Behavior: Behavior normal.         Thought Content: Thought content normal.         Judgment: Judgment normal.         Laboratory Data:  Results from last 7 days   Lab Units 11/17/23  0825 11/16/23  0627 11/15/23  1158   WBC 10*3/mm3 7.91 6.61 8.45   HEMOGLOBIN g/dL 10.4* 9.7* 11.0*   HEMATOCRIT % 33.5* 31.8* 34.8   PLATELETS 10*3/mm3 217 217 247       Results from last 7 days   Lab Units 11/17/23  0825 11/16/23  0627 11/15/23  1158   SODIUM mmol/L 135* 142 141   POTASSIUM mmol/L 3.9 4.7 4.2   CHLORIDE mmol/L 95* 106 105   CO2 mmol/L 24.0 24.0 25.0   BUN mg/dL 26* 51* 49*   CREATININE mg/dL 2.80* 4.46* 4.31*   CALCIUM mg/dL 7.9* 9.0 9.5   BILIRUBIN mg/dL  --   --  0.3   ALK PHOS U/L  --   --  130*   ALT (SGPT) U/L  --   --  7   AST (SGOT) U/L  --   --  12   GLUCOSE mg/dL 211* 154* 139*     Results from last 7 days   Lab Units 11/17/23  0943 11/16/23  2356 11/16/23  1943 11/15/23  1842 11/15/23  1158   INR   --   --   --   --  1.10*   APTT seconds 53.2* 46.5* 65.8*   < > 32.4    < > = values in this  interval not displayed.         Diagnostic Data:  History: PAD     Comments: Grayscale imaging as well as color flow duplex were used to  evaluate bilateral lower extremity arterial systems.     On the right, the peak systolic velocity in the common femoral artery  measured 240.6 cm/s. In the profunda femoris artery measured 200.7 cm/s.  The entire SFA and popliteal artery are occluded. The posterior tibial  artery is also occluded. In the anterior tibial artery measured 63.3  cm/s.     On the left, the peak systolic velocity in the common femoral artery  measured 39.1 cm/s. In the profunda femoris artery measured 64 cm/s. In  the proximal SFA measured 21.2 cm/s. The mid to distal SFA and popliteal  artery are occluded. In the posterior tibial artery measured 13 cm/s. In  the anterior tibial artery measured 32.4 cm/s.     IMPRESSION:  Chronic bilateral SFA and popliteal artery occlusions. There  is reconstitution of flow in both tibial vessels in the lower  extremities.     This report was signed and finalized on 11/15/2023 4:18 PM CST by Dr. Kwame Villagomez MD.      Impression:    Ischemic rest pain of lower extremity    End stage renal disease on dialysis    Type 2 diabetes mellitus, with long-term current use of insulin    Primary hypertension    Coronary artery disease involving native coronary artery of native heart without angina pectoris    Chronic GERD  PAD    Plan: After thoroughly evaluating Harmony Montoya, I believe the best course of action is to proceed with angiogram of her left lower extremity.  She is currently on a heparin drip and will continue which will be held at 10 AM. Risks of angiogram were discussed.  These include, but are not limited to, bleeding, infection, vessel damage, nerve damage, embolus, and loss of limb.  The patient understands these risks and wishes to proceed with procedure.  She has minimal motor function and necrotic left fourth digit.  We did discuss the possibility of limb  loss  Thank you for allowing me to participate in the care of your patient.  Please do not hesitate to call with any questions or concerns.  Jessi Gallardo, HEMANT   Vascular Surgery  [phone]

## 2023-11-17 NOTE — PLAN OF CARE
Pt transferred to ICU 11/15 for acute confusion after dialysis. On heparin gtt @ 15U/kg/hr. Pt only orient to self, intermittently saying illogical off the wall things. Pt removed IV along with all other equipment several times. Redirecting did not help, R & L wrist restraints ordered and in use. NSR, persistent HTN; hydralazine x1, labatolol x1, cardene ordered if needed. Minimal c/o pain.     LLE aniogram planned for today, consent obtained. NPO since 0000

## 2023-11-17 NOTE — PROGRESS NOTES
[facility] Medicine Services  INPATIENT PROGRESS NOTE    Patient Name: Harmony Montoya  Date of Admission: 11/15/2023  Today's Date: 11/17/23  Length of Stay: 2  Primary Care Physician: Provider, No Known    Subjective   Chief Complaint: Left leg pain  HPI   Patient seen in critical care, she is alert to person and place. Responds appropriately today.     11/17 Patient is confused and pulling IVs after HR today. Has received Ativan with partial effect.     Review of Systems   All pertinent negatives and positives are as above. All other systems have been reviewed and are negative unless otherwise stated.     Objective    Temp:  [97.3 °F (36.3 °C)-100.3 °F (37.9 °C)] 98.4 °F (36.9 °C)  Heart Rate:  [69-90] 83  Resp:  [14-18] 14  BP: (150-197)/() 156/62  Physical Exam  Vitals reviewed.   Constitutional:       General: She is not in acute distress.     Appearance: She is well-developed. She is not toxic-appearing.   HENT:      Head: Normocephalic and atraumatic.      Right Ear: External ear normal.      Left Ear: External ear normal.      Mouth/Throat:      Mouth: Mucous membranes are dry.      Pharynx: Oropharynx is clear.   Eyes:      General:         Right eye: No discharge.         Left eye: No discharge.      Extraocular Movements: Extraocular movements intact.      Conjunctiva/sclera: Conjunctivae normal.      Pupils: Pupils are equal, round, and reactive to light.   Neck:      Vascular: No JVD.   Cardiovascular:      Rate and Rhythm: Normal rate and regular rhythm.      Heart sounds: Normal heart sounds.   Pulmonary:      Effort: Pulmonary effort is normal. No respiratory distress.      Breath sounds: No stridor. No wheezing, rhonchi or rales.   Chest:      Chest wall: No tenderness.   Abdominal:      General: Bowel sounds are normal. There is no distension.      Palpations: Abdomen is soft.      Tenderness: There is no abdominal tenderness. There is no guarding or  "rebound.      Hernia: No hernia is present.   Musculoskeletal:         General: No swelling, tenderness or deformity. Normal range of motion.      Cervical back: Normal range of motion and neck supple. No rigidity or tenderness. No muscular tenderness.      Right lower leg: No edema.      Left lower leg: No edema.   Skin:     General: Skin is dry.      Findings: No erythema or rash.      Comments: Distal cyanosis and atrophy legs L>R   Neurological:      General: No focal deficit present.      Mental Status: She is alert. She is disoriented.      Cranial Nerves: No cranial nerve deficit.      Sensory: No sensory deficit.      Motor: No weakness or abnormal muscle tone.      Deep Tendon Reflexes: Reflexes normal.     Results Review:  I have reviewed the labs, radiology results, and diagnostic studies.    Laboratory Data:   Results from last 7 days   Lab Units 11/17/23  0825 11/16/23  0627 11/15/23  1158   WBC 10*3/mm3 7.91 6.61 8.45   HEMOGLOBIN g/dL 10.4* 9.7* 11.0*   HEMATOCRIT % 33.5* 31.8* 34.8   PLATELETS 10*3/mm3 217 217 247        Results from last 7 days   Lab Units 11/17/23  0825 11/16/23  0627 11/15/23  1158   SODIUM mmol/L 135* 142 141   POTASSIUM mmol/L 3.9 4.7 4.2   CHLORIDE mmol/L 95* 106 105   CO2 mmol/L 24.0 24.0 25.0   BUN mg/dL 26* 51* 49*   CREATININE mg/dL 2.80* 4.46* 4.31*   CALCIUM mg/dL 7.9* 9.0 9.5   BILIRUBIN mg/dL  --   --  0.3   ALK PHOS U/L  --   --  130*   ALT (SGPT) U/L  --   --  7   AST (SGOT) U/L  --   --  12   GLUCOSE mg/dL 211* 154* 139*       Culture Data:   No results found for: \"BLOODCX\", \"URINECX\", \"WOUNDCX\", \"MRSACX\", \"RESPCX\", \"STOOLCX\"    Radiology Data:   Imaging Results (Last 24 Hours)       ** No results found for the last 24 hours. **            I have reviewed the patient's current medications.     Assessment/Plan   Assessment  Active Hospital Problems    Diagnosis     **Ischemic rest pain of lower extremity     End stage renal disease on dialysis     Type 2 diabetes " mellitus, with long-term current use of insulin     Primary hypertension     Coronary artery disease involving native coronary artery of native heart without angina pectoris     Chronic GERD    Acute delirium    Treatment Plan  Patient delirious this afternoon, at risk of falls and bleeding. Will place in critical care for safety.   Ativan IV prn agitation  Vitals per protocol  Diet cardiac ADA  Up with assistance, fall precautions  Pain control > Tylenol 650 > Lortab 5 > Dilaudid 0.5 IV q4h prn  Heparin drip protocol D for renal failure  Vascular surgery consult > plan for angiogram of LLE.     ESRD > Nephrology consult in place     DM > Hold Levemir  Check A1C, lipid panel  Humalog low dose sliding scale     HTN uncontrolled overnight > Cardene drip  Continue home medications Lisinopril     DVT prophylaxis > Patient currently fully anticoagulated    Medical Decision Making  Number and Complexity of problems: 4 complex medical problems  Differential Diagnosis: none     Conditions and Status        Condition is unchanged.     [facility] Data  External documents reviewed: [employer] EHR, care everywhere records  Cardiac tracing (EKG, telemetry) interpretation: EKG pending  Radiology interpretation: See above  Labs reviewed: See above  Any tests that were considered but not ordered: none     Decision rules/scores evaluated (example [id number], Wells, etc): N/A     Discussed with: Patient and nurse Flavio     Care Planning  Shared decision making: Patient  Code status and discussions: Full code     Disposition  Social Determinants of Health that impact treatment or disposition: none yet  I expect the patient to be discharged to home in 2-4 days.     Electronically signed by Salvatore Miller MD, 11/17/23, 12:52 CST.

## 2023-11-17 NOTE — PLAN OF CARE
Goal Outcome Evaluation:  Plan of Care Reviewed With: patient        Progress: no change            Problem: Adult Inpatient Plan of Care  Goal: Plan of Care Review  Outcome: Ongoing, Progressing  Flowsheets (Taken 11/17/2023 1740)  Progress: no change  Plan of Care Reviewed With: patient  Goal: Patient-Specific Goal (Individualized)  Outcome: Ongoing, Progressing  Goal: Absence of Hospital-Acquired Illness or Injury  Outcome: Ongoing, Progressing  Intervention: Identify and Manage Fall Risk  Recent Flowsheet Documentation  Taken 11/17/2023 1734 by Janell Padilla RN  Safety Promotion/Fall Prevention: safety round/check completed  Taken 11/17/2023 1630 by Janell Padilla RN  Safety Promotion/Fall Prevention: safety round/check completed  Taken 11/17/2023 1500 by Janell Padilla RN  Safety Promotion/Fall Prevention: safety round/check completed  Taken 11/17/2023 1400 by Janell Padilla RN  Safety Promotion/Fall Prevention: safety round/check completed  Taken 11/17/2023 1300 by Janell Padilla RN  Safety Promotion/Fall Prevention: safety round/check completed  Taken 11/17/2023 1200 by Janell Padilla RN  Safety Promotion/Fall Prevention: safety round/check completed  Taken 11/17/2023 1100 by Janell Padilla RN  Safety Promotion/Fall Prevention: safety round/check completed  Taken 11/17/2023 1000 by Janell Padilla RN  Safety Promotion/Fall Prevention: safety round/check completed  Taken 11/17/2023 0900 by Janell Padilla RN  Safety Promotion/Fall Prevention: safety round/check completed  Taken 11/17/2023 0800 by Janell Padilla RN  Safety Promotion/Fall Prevention: safety round/check completed  Taken 11/17/2023 0732 by Janell Padilla RN  Safety Promotion/Fall Prevention: safety round/check completed  Taken 11/17/2023 0700 by Janell Padilla RN  Safety Promotion/Fall Prevention: safety round/check completed  Intervention: Prevent Skin Injury  Recent Flowsheet Documentation  Taken 11/17/2023 1548 by Janell Padilla  RN  Body Position:   turned   left   lower extremity elevated   upper extremity elevated  Taken 11/17/2023 1400 by Janell Padilla RN  Body Position:   turned   supine   lower extremity elevated   upper extremity elevated  Taken 11/17/2023 1200 by Janell Padilla RN  Body Position:   turned   left   lower extremity elevated   upper extremity elevated  Taken 11/17/2023 1000 by Janell Padilla RN  Body Position:   turned   right   lower extremity elevated   upper extremity elevated  Taken 11/17/2023 0732 by Janell Padilla RN  Body Position: position changed independently  Skin Protection:   incontinence pads utilized   tubing/devices free from skin contact  Intervention: Prevent and Manage VTE (Venous Thromboembolism) Risk  Recent Flowsheet Documentation  Taken 11/17/2023 1630 by Janell Padilla RN  Activity Management: bedrest  Taken 11/17/2023 0732 by Janell Padilla RN  Activity Management: bedrest  Goal: Optimal Comfort and Wellbeing  Outcome: Ongoing, Progressing  Goal: Readiness for Transition of Care  Outcome: Ongoing, Progressing     Problem: Pain Acute  Goal: Acceptable Pain Control and Functional Ability  Outcome: Ongoing, Progressing  Intervention: Optimize Psychosocial Wellbeing  Recent Flowsheet Documentation  Taken 11/17/2023 1600 by Janell Padilla RN  Diversional Activities: television  Taken 11/17/2023 1400 by Janell Padilla RN  Diversional Activities: television  Taken 11/17/2023 1200 by Janell Padilla RN  Diversional Activities: television  Taken 11/17/2023 1000 by Janell Padilla RN  Diversional Activities: television  Taken 11/17/2023 0800 by Janell Padilla RN  Diversional Activities: television     Problem: Adjustment to Illness (Chronic Kidney Disease)  Goal: Optimal Coping with Chronic Illness  Outcome: Ongoing, Progressing     Problem: Electrolyte Imbalance (Chronic Kidney Disease)  Goal: Electrolyte Balance  Outcome: Ongoing, Progressing     Problem: Fluid Volume Excess (Chronic Kidney  Disease)  Goal: Fluid Balance  Outcome: Ongoing, Progressing  Intervention: Monitor and Manage Hypervolemia  Recent Flowsheet Documentation  Taken 11/17/2023 0732 by Janell Padilla RN  Skin Protection:   incontinence pads utilized   tubing/devices free from skin contact     Problem: Functional Decline (Chronic Kidney Disease)  Goal: Optimal Functional Ability  Outcome: Ongoing, Progressing  Intervention: Optimize Functional Ability  Recent Flowsheet Documentation  Taken 11/17/2023 1630 by Janell Padilla RN  Activity Management: bedrest  Taken 11/17/2023 0732 by Janell Padilla RN  Activity Management: bedrest     Problem: Hematologic Alteration (Chronic Kidney Disease)  Goal: Absence of Anemia Signs and Symptoms  Outcome: Ongoing, Progressing     Problem: Oral Intake Inadequate (Chronic Kidney Disease)  Goal: Optimal Oral Intake  Outcome: Ongoing, Progressing     Problem: Pain (Chronic Kidney Disease)  Goal: Acceptable Pain Control  Outcome: Ongoing, Progressing     Problem: Renal Function Impairment (Chronic Kidney Disease)  Goal: Minimize Renal Failure Effects  Outcome: Ongoing, Progressing     Problem: Fall Injury Risk  Goal: Absence of Fall and Fall-Related Injury  Outcome: Ongoing, Progressing  Intervention: Promote Injury-Free Environment  Recent Flowsheet Documentation  Taken 11/17/2023 1734 by Janell Padilla RN  Safety Promotion/Fall Prevention: safety round/check completed  Taken 11/17/2023 1630 by Janell Padilla RN  Safety Promotion/Fall Prevention: safety round/check completed  Taken 11/17/2023 1500 by Janell Padilla RN  Safety Promotion/Fall Prevention: safety round/check completed  Taken 11/17/2023 1400 by Janell Padilla RN  Safety Promotion/Fall Prevention: safety round/check completed  Taken 11/17/2023 1300 by Janell Padilla RN  Safety Promotion/Fall Prevention: safety round/check completed  Taken 11/17/2023 1200 by Janell Padilla RN  Safety Promotion/Fall Prevention: safety round/check  completed  Taken 11/17/2023 1100 by Janell Padilla RN  Safety Promotion/Fall Prevention: safety round/check completed  Taken 11/17/2023 1000 by Janell Padilla RN  Safety Promotion/Fall Prevention: safety round/check completed  Taken 11/17/2023 0900 by Janell Padilla RN  Safety Promotion/Fall Prevention: safety round/check completed  Taken 11/17/2023 0800 by Janell Padilla RN  Safety Promotion/Fall Prevention: safety round/check completed  Taken 11/17/2023 0732 by Janell Padilla RN  Safety Promotion/Fall Prevention: safety round/check completed  Taken 11/17/2023 0700 by Janell Padilla RN  Safety Promotion/Fall Prevention: safety round/check completed     Problem: Restraint, Nonviolent  Goal: Absence of Harm or Injury  Outcome: Ongoing, Progressing  Intervention: Implement Least Restrictive Safety Strategies  Recent Flowsheet Documentation  Taken 11/17/2023 1600 by Janell Padilla RN  Medical Device Protection: tubing secured  Less Restrictive Alternative:   sensory stimulation limited   bed alarm in use  De-Escalation Techniques: stimulation decreased  Diversional Activities: television  Taken 11/17/2023 1400 by Janell Padilla RN  Medical Device Protection: tubing secured  Less Restrictive Alternative:   sensory stimulation limited   bed alarm in use  De-Escalation Techniques: stimulation decreased  Diversional Activities: television  Taken 11/17/2023 1200 by Janell Padilla RN  Medical Device Protection: tubing secured  Less Restrictive Alternative:   sensory stimulation limited   bed alarm in use  De-Escalation Techniques: reoriented  Diversional Activities: television  Taken 11/17/2023 1000 by Janell Padilla RN  Medical Device Protection:   tubing secured   torso covered  Less Restrictive Alternative:   sensory stimulation limited   bed alarm in use  De-Escalation Techniques: reoriented  Diversional Activities: television  Taken 11/17/2023 0800 by Janell Padilla RN  Medical Device Protection:   torso  covered   tubing secured  Less Restrictive Alternative:   sensory stimulation limited   bed alarm in use  De-Escalation Techniques: stimulation decreased  Diversional Activities: television  Intervention: Protect Skin and Joint Integrity  Recent Flowsheet Documentation  Taken 11/17/2023 1548 by Janell Padilla RN  Body Position:   turned   left   lower extremity elevated   upper extremity elevated  Taken 11/17/2023 1400 by Janell Padilla RN  Body Position:   turned   supine   lower extremity elevated   upper extremity elevated  Taken 11/17/2023 1200 by Janell Padilla RN  Body Position:   turned   left   lower extremity elevated   upper extremity elevated  Taken 11/17/2023 1000 by Janell Padilla RN  Body Position:   turned   right   lower extremity elevated   upper extremity elevated  Taken 11/17/2023 0732 by Janell Padilla RN  Body Position: position changed independently     Problem: Skin Injury Risk Increased  Goal: Skin Health and Integrity  Outcome: Ongoing, Progressing  Intervention: Optimize Skin Protection  Recent Flowsheet Documentation  Taken 11/17/2023 1548 by Janell Padilla RN  Head of Bed (HOB) Positioning: HOB at 30 degrees  Taken 11/17/2023 1400 by Janell Padilla RN  Head of Bed (HOB) Positioning: HOB at 30 degrees  Taken 11/17/2023 1200 by Jnaell Padilla RN  Head of Bed (HOB) Positioning: HOB at 30 degrees  Taken 11/17/2023 1000 by Janell Padlila RN  Head of Bed (HOB) Positioning: HOB at 30 degrees  Taken 11/17/2023 0732 by Janell Padilla RN  Skin Protection:   incontinence pads utilized   tubing/devices free from skin contact

## 2023-11-18 LAB
ANION GAP SERPL CALCULATED.3IONS-SCNC: 13 MMOL/L (ref 5–15)
APTT PPP: 30.6 SECONDS (ref 24.5–36)
APTT PPP: 44.3 SECONDS (ref 24.5–36)
APTT PPP: 44.4 SECONDS (ref 24.5–36)
APTT PPP: 91.2 SECONDS (ref 24.5–36)
BASOPHILS # BLD AUTO: 0.05 10*3/MM3 (ref 0–0.2)
BASOPHILS NFR BLD AUTO: 0.6 % (ref 0–1.5)
BUN SERPL-MCNC: 27 MG/DL (ref 8–23)
BUN/CREAT SERPL: 9 (ref 7–25)
CALCIUM SPEC-SCNC: 9.2 MG/DL (ref 8.6–10.5)
CHLORIDE SERPL-SCNC: 96 MMOL/L (ref 98–107)
CO2 SERPL-SCNC: 26 MMOL/L (ref 22–29)
CREAT SERPL-MCNC: 2.99 MG/DL (ref 0.57–1)
DEPRECATED RDW RBC AUTO: 49.4 FL (ref 37–54)
EGFRCR SERPLBLD CKD-EPI 2021: 16 ML/MIN/1.73
EOSINOPHIL # BLD AUTO: 0.09 10*3/MM3 (ref 0–0.4)
EOSINOPHIL NFR BLD AUTO: 1.1 % (ref 0.3–6.2)
ERYTHROCYTE [DISTWIDTH] IN BLOOD BY AUTOMATED COUNT: 13.5 % (ref 12.3–15.4)
GLUCOSE BLDC GLUCOMTR-MCNC: 193 MG/DL (ref 70–130)
GLUCOSE BLDC GLUCOMTR-MCNC: 271 MG/DL (ref 70–130)
GLUCOSE BLDC GLUCOMTR-MCNC: 277 MG/DL (ref 70–130)
GLUCOSE SERPL-MCNC: 212 MG/DL (ref 65–99)
HCT VFR BLD AUTO: 31.9 % (ref 34–46.6)
HGB BLD-MCNC: 10 G/DL (ref 12–15.9)
IMM GRANULOCYTES # BLD AUTO: 0.02 10*3/MM3 (ref 0–0.05)
IMM GRANULOCYTES NFR BLD AUTO: 0.2 % (ref 0–0.5)
LYMPHOCYTES # BLD AUTO: 1.52 10*3/MM3 (ref 0.7–3.1)
LYMPHOCYTES NFR BLD AUTO: 17.8 % (ref 19.6–45.3)
MCH RBC QN AUTO: 31.2 PG (ref 26.6–33)
MCHC RBC AUTO-ENTMCNC: 31.3 G/DL (ref 31.5–35.7)
MCV RBC AUTO: 99.4 FL (ref 79–97)
MONOCYTES # BLD AUTO: 1.23 10*3/MM3 (ref 0.1–0.9)
MONOCYTES NFR BLD AUTO: 14.4 % (ref 5–12)
NEUTROPHILS NFR BLD AUTO: 5.61 10*3/MM3 (ref 1.7–7)
NEUTROPHILS NFR BLD AUTO: 65.9 % (ref 42.7–76)
NRBC BLD AUTO-RTO: 0 /100 WBC (ref 0–0.2)
PLATELET # BLD AUTO: 188 10*3/MM3 (ref 140–450)
PMV BLD AUTO: 11.1 FL (ref 6–12)
POTASSIUM SERPL-SCNC: 4.2 MMOL/L (ref 3.5–5.2)
RBC # BLD AUTO: 3.21 10*6/MM3 (ref 3.77–5.28)
SODIUM SERPL-SCNC: 135 MMOL/L (ref 136–145)
WBC NRBC COR # BLD AUTO: 8.52 10*3/MM3 (ref 3.4–10.8)

## 2023-11-18 PROCEDURE — 25010000002 HEPARIN (PORCINE) PER 1000 UNITS: Performed by: SURGERY

## 2023-11-18 PROCEDURE — 25010000002 HYDROMORPHONE PER 4 MG: Performed by: SURGERY

## 2023-11-18 PROCEDURE — 80048 BASIC METABOLIC PNL TOTAL CA: CPT | Performed by: FAMILY MEDICINE

## 2023-11-18 PROCEDURE — 25010000002 HYDROMORPHONE PER 4 MG: Performed by: FAMILY MEDICINE

## 2023-11-18 PROCEDURE — 82948 REAGENT STRIP/BLOOD GLUCOSE: CPT

## 2023-11-18 PROCEDURE — 36415 COLL VENOUS BLD VENIPUNCTURE: CPT | Performed by: FAMILY MEDICINE

## 2023-11-18 PROCEDURE — 85730 THROMBOPLASTIN TIME PARTIAL: CPT | Performed by: FAMILY MEDICINE

## 2023-11-18 PROCEDURE — 25010000002 HEPARIN (PORCINE) PER 1000 UNITS: Performed by: FAMILY MEDICINE

## 2023-11-18 PROCEDURE — 63710000001 INSULIN LISPRO (HUMAN) PER 5 UNITS: Performed by: FAMILY MEDICINE

## 2023-11-18 PROCEDURE — 85025 COMPLETE CBC W/AUTO DIFF WBC: CPT | Performed by: FAMILY MEDICINE

## 2023-11-18 PROCEDURE — 85730 THROMBOPLASTIN TIME PARTIAL: CPT | Performed by: SURGERY

## 2023-11-18 RX ORDER — INSULIN LISPRO 100 [IU]/ML
2-9 INJECTION, SOLUTION INTRAVENOUS; SUBCUTANEOUS
Status: DISCONTINUED | OUTPATIENT
Start: 2023-11-18 | End: 2023-11-24 | Stop reason: HOSPADM

## 2023-11-18 RX ORDER — BISACODYL 5 MG/1
10 TABLET, DELAYED RELEASE ORAL DAILY PRN
Status: DISCONTINUED | OUTPATIENT
Start: 2023-11-18 | End: 2023-11-18

## 2023-11-18 RX ORDER — POLYETHYLENE GLYCOL 3350 17 G/17G
17 POWDER, FOR SOLUTION ORAL DAILY PRN
Status: DISCONTINUED | OUTPATIENT
Start: 2023-11-18 | End: 2023-11-24 | Stop reason: HOSPADM

## 2023-11-18 RX ORDER — HYDROMORPHONE HYDROCHLORIDE 1 MG/ML
0.5 INJECTION, SOLUTION INTRAMUSCULAR; INTRAVENOUS; SUBCUTANEOUS EVERY 4 HOURS PRN
Status: DISCONTINUED | OUTPATIENT
Start: 2023-11-18 | End: 2023-11-23

## 2023-11-18 RX ORDER — ACETAMINOPHEN 325 MG/1
650 TABLET ORAL EVERY 6 HOURS PRN
Status: DISCONTINUED | OUTPATIENT
Start: 2023-11-18 | End: 2023-11-23

## 2023-11-18 RX ORDER — NALOXONE HCL 0.4 MG/ML
0.4 VIAL (ML) INJECTION
Status: DISCONTINUED | OUTPATIENT
Start: 2023-11-18 | End: 2023-11-23

## 2023-11-18 RX ORDER — ASPIRIN 81 MG/1
81 TABLET, CHEWABLE ORAL DAILY
Status: DISCONTINUED | OUTPATIENT
Start: 2023-11-18 | End: 2023-11-24 | Stop reason: HOSPADM

## 2023-11-18 RX ORDER — BISACODYL 5 MG/1
5 TABLET, DELAYED RELEASE ORAL DAILY PRN
Status: DISCONTINUED | OUTPATIENT
Start: 2023-11-18 | End: 2023-11-18

## 2023-11-18 RX ORDER — PANTOPRAZOLE SODIUM 40 MG/1
40 TABLET, DELAYED RELEASE ORAL
Status: DISCONTINUED | OUTPATIENT
Start: 2023-11-18 | End: 2023-11-24 | Stop reason: HOSPADM

## 2023-11-18 RX ORDER — SODIUM CHLORIDE 0.9 % (FLUSH) 0.9 %
10 SYRINGE (ML) INJECTION EVERY 12 HOURS
Status: DISCONTINUED | OUTPATIENT
Start: 2023-11-18 | End: 2023-11-24 | Stop reason: HOSPADM

## 2023-11-18 RX ORDER — HEPARIN SODIUM 1000 [USP'U]/ML
30 INJECTION, SOLUTION INTRAVENOUS; SUBCUTANEOUS AS NEEDED
Status: DISCONTINUED | OUTPATIENT
Start: 2023-11-18 | End: 2023-11-24 | Stop reason: HOSPADM

## 2023-11-18 RX ORDER — BISACODYL 10 MG
10 SUPPOSITORY, RECTAL RECTAL DAILY PRN
Status: DISCONTINUED | OUTPATIENT
Start: 2023-11-18 | End: 2023-11-18

## 2023-11-18 RX ORDER — HEPARIN SODIUM 1000 [USP'U]/ML
60 INJECTION, SOLUTION INTRAVENOUS; SUBCUTANEOUS AS NEEDED
Status: DISCONTINUED | OUTPATIENT
Start: 2023-11-18 | End: 2023-11-24 | Stop reason: HOSPADM

## 2023-11-18 RX ORDER — DEXTROSE MONOHYDRATE 25 G/50ML
25 INJECTION, SOLUTION INTRAVENOUS
Status: DISCONTINUED | OUTPATIENT
Start: 2023-11-18 | End: 2023-11-24 | Stop reason: HOSPADM

## 2023-11-18 RX ORDER — SODIUM CHLORIDE 0.9 % (FLUSH) 0.9 %
10 SYRINGE (ML) INJECTION AS NEEDED
Status: DISCONTINUED | OUTPATIENT
Start: 2023-11-18 | End: 2023-11-24 | Stop reason: HOSPADM

## 2023-11-18 RX ORDER — ONDANSETRON 2 MG/ML
4 INJECTION INTRAMUSCULAR; INTRAVENOUS EVERY 6 HOURS PRN
Status: DISCONTINUED | OUTPATIENT
Start: 2023-11-18 | End: 2023-11-24 | Stop reason: HOSPADM

## 2023-11-18 RX ORDER — NITROGLYCERIN 0.4 MG/1
0.4 TABLET SUBLINGUAL
Status: DISCONTINUED | OUTPATIENT
Start: 2023-11-18 | End: 2023-11-24 | Stop reason: HOSPADM

## 2023-11-18 RX ORDER — BISACODYL 10 MG
10 SUPPOSITORY, RECTAL RECTAL DAILY PRN
Status: DISCONTINUED | OUTPATIENT
Start: 2023-11-18 | End: 2023-11-24 | Stop reason: HOSPADM

## 2023-11-18 RX ORDER — BISACODYL 5 MG/1
5 TABLET, DELAYED RELEASE ORAL DAILY PRN
Status: DISCONTINUED | OUTPATIENT
Start: 2023-11-18 | End: 2023-11-24 | Stop reason: HOSPADM

## 2023-11-18 RX ORDER — POLYETHYLENE GLYCOL 3350 17 G/17G
17 POWDER, FOR SOLUTION ORAL DAILY PRN
Status: DISCONTINUED | OUTPATIENT
Start: 2023-11-18 | End: 2023-11-18

## 2023-11-18 RX ORDER — AMOXICILLIN 250 MG
2 CAPSULE ORAL 2 TIMES DAILY
Status: DISCONTINUED | OUTPATIENT
Start: 2023-11-18 | End: 2023-11-18 | Stop reason: SDUPTHER

## 2023-11-18 RX ORDER — AMOXICILLIN 250 MG
2 CAPSULE ORAL 2 TIMES DAILY
Status: DISCONTINUED | OUTPATIENT
Start: 2023-11-18 | End: 2023-11-18

## 2023-11-18 RX ORDER — HYDROCODONE BITARTRATE AND ACETAMINOPHEN 5; 325 MG/1; MG/1
1 TABLET ORAL EVERY 4 HOURS PRN
Status: DISCONTINUED | OUTPATIENT
Start: 2023-11-18 | End: 2023-11-23

## 2023-11-18 RX ORDER — AMOXICILLIN 250 MG
2 CAPSULE ORAL 2 TIMES DAILY
Status: DISCONTINUED | OUTPATIENT
Start: 2023-11-18 | End: 2023-11-24 | Stop reason: HOSPADM

## 2023-11-18 RX ORDER — CHLORHEXIDINE GLUCONATE 500 MG/1
1 CLOTH TOPICAL EVERY 24 HOURS
Status: DISCONTINUED | OUTPATIENT
Start: 2023-11-19 | End: 2023-11-21

## 2023-11-18 RX ORDER — CHLORHEXIDINE GLUCONATE 500 MG/1
1 CLOTH TOPICAL ONCE
Status: DISCONTINUED | OUTPATIENT
Start: 2023-11-18 | End: 2023-11-21

## 2023-11-18 RX ORDER — NICOTINE POLACRILEX 4 MG
15 LOZENGE BUCCAL
Status: DISCONTINUED | OUTPATIENT
Start: 2023-11-18 | End: 2023-11-24 | Stop reason: HOSPADM

## 2023-11-18 RX ADMIN — HYDROMORPHONE HYDROCHLORIDE 0.5 MG: 1 INJECTION, SOLUTION INTRAMUSCULAR; INTRAVENOUS; SUBCUTANEOUS at 14:45

## 2023-11-18 RX ADMIN — INSULIN LISPRO 4 UNITS: 100 INJECTION, SOLUTION INTRAVENOUS; SUBCUTANEOUS at 11:20

## 2023-11-18 RX ADMIN — Medication 10 ML: at 20:33

## 2023-11-18 RX ADMIN — HYDROMORPHONE HYDROCHLORIDE 0.5 MG: 1 INJECTION, SOLUTION INTRAMUSCULAR; INTRAVENOUS; SUBCUTANEOUS at 09:17

## 2023-11-18 RX ADMIN — HEPARIN SODIUM 3400 UNITS: 1000 INJECTION, SOLUTION INTRAVENOUS; SUBCUTANEOUS at 09:52

## 2023-11-18 RX ADMIN — ASPIRIN 81 MG: 81 TABLET, CHEWABLE ORAL at 09:43

## 2023-11-18 RX ADMIN — PANTOPRAZOLE SODIUM 40 MG: 40 TABLET, DELAYED RELEASE ORAL at 09:43

## 2023-11-18 RX ADMIN — HYDROCODONE BITARTRATE AND ACETAMINOPHEN 1 TABLET: 5; 325 TABLET ORAL at 10:42

## 2023-11-18 RX ADMIN — Medication 1 APPLICATION: at 20:41

## 2023-11-18 RX ADMIN — HYDROMORPHONE HYDROCHLORIDE 0.5 MG: 1 INJECTION, SOLUTION INTRAMUSCULAR; INTRAVENOUS; SUBCUTANEOUS at 20:41

## 2023-11-18 RX ADMIN — HEPARIN SODIUM 3400 UNITS: 1000 INJECTION, SOLUTION INTRAVENOUS; SUBCUTANEOUS at 00:38

## 2023-11-18 RX ADMIN — INSULIN LISPRO 2 UNITS: 100 INJECTION, SOLUTION INTRAVENOUS; SUBCUTANEOUS at 20:41

## 2023-11-18 RX ADMIN — HYDROMORPHONE HYDROCHLORIDE 0.5 MG: 1 INJECTION, SOLUTION INTRAMUSCULAR; INTRAVENOUS; SUBCUTANEOUS at 00:27

## 2023-11-18 RX ADMIN — DOCUSATE SODIUM 50 MG AND SENNOSIDES 8.6 MG 2 TABLET: 8.6; 5 TABLET, FILM COATED ORAL at 09:43

## 2023-11-18 RX ADMIN — INSULIN LISPRO 6 UNITS: 100 INJECTION, SOLUTION INTRAVENOUS; SUBCUTANEOUS at 17:07

## 2023-11-18 RX ADMIN — LISINOPRIL 2.5 MG: 2.5 TABLET ORAL at 20:32

## 2023-11-18 RX ADMIN — LISINOPRIL 2.5 MG: 2.5 TABLET ORAL at 09:43

## 2023-11-18 RX ADMIN — Medication 10 ML: at 09:43

## 2023-11-18 NOTE — PROGRESS NOTES
[facility]  INPATIENT PROGRESS NOTE    Patient Name: Harmony Montoya  Date of Admission: 11/15/2023  Today's Date: 11/18/23  Length of Stay: 3  Primary Care Physician: Provider, No Known    Subjective   Chief Complaint: Left leg pain  HPI   Patient oriented to person, responds pleasantly but continues to not understand context.     11/17 Patient is confused and pulling IVs after HR today. Has received Ativan with partial effect.     Review of Systems   All pertinent negatives and positives are as above. All other systems have been reviewed and are negative unless otherwise stated.     Objective    Temp:  [98 °F (36.7 °C)-98.2 °F (36.8 °C)] 98 °F (36.7 °C)  Heart Rate:  [68-90] 74  Resp:  [15-20] 20  BP: (141-191)/() 175/68  Physical Exam  Vitals reviewed.   Constitutional:       General: She is not in acute distress.     Appearance: She is well-developed. She is not toxic-appearing.   HENT:      Head: Normocephalic and atraumatic.      Right Ear: External ear normal.      Left Ear: External ear normal.      Mouth/Throat:      Mouth: Mucous membranes are dry.      Pharynx: Oropharynx is clear.   Eyes:      General:         Right eye: No discharge.         Left eye: No discharge.      Extraocular Movements: Extraocular movements intact.      Conjunctiva/sclera: Conjunctivae normal.      Pupils: Pupils are equal, round, and reactive to light.   Neck:      Vascular: No JVD.   Cardiovascular:      Rate and Rhythm: Normal rate and regular rhythm.      Heart sounds: Normal heart sounds.   Pulmonary:      Effort: Pulmonary effort is normal. No respiratory distress.      Breath sounds: No stridor. No wheezing, rhonchi or rales.   Chest:      Chest wall: No tenderness.   Abdominal:      General: Bowel sounds are normal. There is no distension.      Palpations: Abdomen is soft.      Tenderness: There is no abdominal tenderness. There is no guarding or rebound.       "Hernia: No hernia is present.   Musculoskeletal:         General: No swelling, tenderness or deformity. Normal range of motion.      Cervical back: Normal range of motion and neck supple. No rigidity or tenderness. No muscular tenderness.      Right lower leg: No edema.      Left lower leg: No edema.   Skin:     General: Skin is dry.      Findings: No erythema or rash.      Comments: Distal cyanosis and atrophy legs L>R   Neurological:      General: No focal deficit present.      Mental Status: She is alert. She is disoriented.      Cranial Nerves: No cranial nerve deficit.      Sensory: No sensory deficit.      Motor: No weakness or abnormal muscle tone.      Deep Tendon Reflexes: Reflexes normal.     Results Review:  I have reviewed the labs, radiology results, and diagnostic studies.    Laboratory Data:   Results from last 7 days   Lab Units 11/18/23  0655 11/17/23 0825 11/16/23 0627   WBC 10*3/mm3 8.52 7.91 6.61   HEMOGLOBIN g/dL 10.0* 10.4* 9.7*   HEMATOCRIT % 31.9* 33.5* 31.8*   PLATELETS 10*3/mm3 188 217 217        Results from last 7 days   Lab Units 11/18/23  0655 11/17/23  0825 11/16/23  0627 11/15/23  1158   SODIUM mmol/L 135* 135* 142 141   POTASSIUM mmol/L 4.2 3.9 4.7 4.2   CHLORIDE mmol/L 96* 95* 106 105   CO2 mmol/L 26.0 24.0 24.0 25.0   BUN mg/dL 27* 26* 51* 49*   CREATININE mg/dL 2.99* 2.80* 4.46* 4.31*   CALCIUM mg/dL 9.2 7.9* 9.0 9.5   BILIRUBIN mg/dL  --   --   --  0.3   ALK PHOS U/L  --   --   --  130*   ALT (SGPT) U/L  --   --   --  7   AST (SGOT) U/L  --   --   --  12   GLUCOSE mg/dL 212* 211* 154* 139*       Culture Data:   No results found for: \"BLOODCX\", \"URINECX\", \"WOUNDCX\", \"MRSACX\", \"RESPCX\", \"STOOLCX\"    Radiology Data:   Imaging Results (Last 24 Hours)       ** No results found for the last 24 hours. **            I have reviewed the patient's current medications.     Assessment/Plan   Assessment  Active Hospital Problems    Diagnosis     **Ischemic rest pain of lower extremity     " End stage renal disease on dialysis     Type 2 diabetes mellitus, with long-term current use of insulin     Primary hypertension     Coronary artery disease involving native coronary artery of native heart without angina pectoris     Chronic GERD    Acute delirium    Treatment Plan  Patient delirious this afternoon, at risk of falls and bleeding. Will place in critical care for safety.   Ativan IV prn agitation  Vitals per protocol  Diet cardiac ADA  Up with assistance, fall precautions  Pain control > Tylenol 650 > Lortab 5 > Dilaudid 0.5 IV q4h prn  Heparin drip protocol D for renal failure  Vascular surgery consult > plan for angiogram of LLE yesterday, but patient refused.      ESRD > Nephrology consult in place     DM > Hold Levemir  Check A1C, lipid panel  Humalog low dose sliding scale     HTN uncontrolled overnight > Cardene drip  Continue home medications Lisinopril     DVT prophylaxis > Patient currently fully anticoagulated    Medical Decision Making  Number and Complexity of problems: 4 complex medical problems  Differential Diagnosis: none     Conditions and Status        Condition is unchanged.     MDM Data  External documents reviewed: [facility] EHR, care everywhere records  Cardiac tracing (EKG, telemetry) interpretation: EKG pending  Radiology interpretation: See above  Labs reviewed: See above  Any tests that were considered but not ordered: none     Decision rules/scores evaluated (example [id number], Wells, etc): N/A     Discussed with: Patient and nurse Flavio     Care Planning  Shared decision making: Patient  Code status and discussions: Full code     Disposition  Social Determinants of Health that impact treatment or disposition: none yet  I expect the patient to be discharged to home in 2-4 days.     Electronically signed by Salvatore Miller MD, 11/18/23, 10:19 CST.

## 2023-11-18 NOTE — NURSING NOTE
0820 - Orders received from Dr. Villagomez. Meds resumed.   0850 - Dr. Miller notified of need for sliding scale insulin and restraint orders. Awaiting reply.

## 2023-11-18 NOTE — NURSING NOTE
Pt more confused at this time. Gives me her address when asked her birthday. Does not know the year. Will monitor.

## 2023-11-18 NOTE — NURSING NOTE
Pt is requesting pain medication. Most meds are still held from canceled angio yesterday. Attempts made to reach Dr Miller. Dr. Villagomez and vascular unavailable this weekend. Awaiting Dr. Miller's reply over secure chat.

## 2023-11-18 NOTE — PROGRESS NOTES
Nephrology ([facility]) Progress Note      Patient:  Harmony Montoya  YOB: 1950  Date of Service: 11/18/2023  MRN: [id number]   Acct: [id number]   Primary Care Physician: Provider, No Known  Advance Directive:   Code Status and Medical Interventions:   Ordered at: 11/15/23 0855     Code Status (Patient has no pulse and is not breathing):    CPR (Attempt to Resuscitate)     Medical Interventions (Patient has pulse or is breathing):    Full Support     Admit Date: 11/15/2023       Hospital Day: 3  Referring Provider: No ref. provider found      Patient personally seen and examined.  Complete chart including Consults, Notes, Operative Reports, Labs, Cardiology, and Radiology studies reviewed as able.        Subjective:  Harmony Montoya is a 73 y.o. female for whom we were consulted for evaluation and treatment of end stage renal disease. Maintenance hemodialysis Monday Wednesday Friday at [facility]. Missed HD on 11/15. History of type 2 diabetes and hypertension. Presented with pain in left foot. Admitted for vascular evaluation. Underwent make-up dialysis on 11/16. Tolerated treatment well but later developed some confusion and was moved to CCU overnight.    Today is awake,confused. Overnight events noted. Planning for angiogram next week.     Allergies:  Statins    Home Meds:  Medications Prior to Admission   Medication Sig Dispense Refill Last Dose    Ascorbic Acid (Vitamin C) 500 MG capsule Take 2 capsules by mouth Daily.   11/15/2023    aspirin 81 MG chewable tablet Chew 1 tablet Daily.   11/14/2023    carvedilol (COREG) 25 MG tablet Take 1 tablet by mouth 2 (Two) Times a Day With Meals.   11/15/2023    clopidogrel (PLAVIX) 75 MG tablet Take 1 tablet by mouth Daily.   11/14/2023    diphenhydrAMINE (BENADRYL) 25 mg capsule Take 1 capsule by mouth Daily.   11/14/2023    insulin glargine (LANTUS, SEMGLEE) 100 UNIT/ML injection Inject 7 Units under the skin into the  appropriate area as directed Daily.   11/15/2023    lisinopril (PRINIVIL,ZESTRIL) 20 MG tablet Take 0.5 tablets by mouth Daily.   11/15/2023    omeprazole (priLOSEC) 40 MG capsule Take 1 capsule by mouth Daily.   11/15/2023    vitamin D3 125 MCG (5000 UT) capsule capsule Take 1 capsule by mouth Daily.   11/15/2023    insulin regular (humuLIN R,novoLIN R) 100 UNIT/ML injection Inject 6 Units under the skin into the appropriate area as directed Daily Before Supper.       Polyvinyl Alcohol-Povidone PF (HYPOTEARS) 1.4-0.6 % ophthalmic solution Administer 1 drop to both eyes 4 (Four) Times a Day.          Medicines:  Current Facility-Administered Medications   Medication Dose Route Frequency Provider Last Rate Last Admin    acetaminophen (TYLENOL) tablet 650 mg  650 mg Oral Q6H PRN Bicking, Kwame K, DO        aspirin chewable tablet 81 mg  81 mg Oral Daily BickingKwame, DO   81 mg at 11/18/23 0943    sennosides-docusate (PERICOLACE) 8.6-50 MG per tablet 2 tablet  2 tablet Oral BID BickingKwame K, DO   2 tablet at 11/18/23 0943    And    polyethylene glycol (MIRALAX) packet 17 g  17 g Oral Daily PRN BickingKwame DO        And    bisacodyl (DULCOLAX) EC tablet 5 mg  5 mg Oral Daily PRN Bicking, Kwame TENORIO, DO        And    bisacodyl (DULCOLAX) suppository 10 mg  10 mg Rectal Daily PRN BickingKwame, DO        ceFAZolin (ANCEF) IVPB 1,000 mg  1,000 mg Intravenous Once Jessi Gallardo, HEMANT        Chlorhexidine Gluconate Cloth 2 % pads 1 application   1 application  Topical Once Salvatore Miller MD        [START ON 11/19/2023] Chlorhexidine Gluconate Cloth 2 % pads 1 application   1 application  Topical Q24H Salvatore Miller MD        dextrose (D50W) (25 g/50 mL) IV injection 25 g  25 g Intravenous Q15 Min PRN Salvatore Miller MD        dextrose (GLUTOSE) oral gel 15 g  15 g Oral Q15 Min PRN Salvatore Miller MD        glucagon (GLUCAGEN) injection 1 mg  1 mg  Intramuscular Q15 Min PRN Salvatore Miller MD        heparin (porcine) injection 1,700 Units  30 Units/kg Intravenous PRN Kwame Villagomez DO        heparin (porcine) injection 3,400 Units  60 Units/kg Intravenous PRN Kwame Villagomez DO   3,400 Units at 11/18/23 0952    heparin 06647 units/250 mL (100 units/mL) in 0.45 % NaCl infusion  12 Units/kg/hr Intravenous Titrated Salvatore Miller MD 8.5 mL/hr at 11/18/23 1703 15 Units/kg/hr at 11/18/23 1703    hydrALAZINE (APRESOLINE) injection 10 mg  10 mg Intravenous Q6H PRN Jane Armstrong MD   10 mg at 11/17/23 0110    HYDROcodone-acetaminophen (NORCO) 5-325 MG per tablet 1 tablet  1 tablet Oral Q4H PRN Kwame Villagomez DO   1 tablet at 11/18/23 1042    HYDROmorphone (DILAUDID) injection 0.5 mg  0.5 mg Intravenous Q4H PRN Kwame Villagomez DO   0.5 mg at 11/18/23 1445    Insulin Lispro (humaLOG) injection 2-9 Units  2-9 Units Subcutaneous 4x Daily AC & at Bedtime Salvatore Miller MD   6 Units at 11/18/23 1707    labetalol (NORMODYNE,TRANDATE) injection 10 mg  10 mg Intravenous Q4H PRN Jane Armstrong MD   10 mg at 11/17/23 0353    lisinopril (PRINIVIL,ZESTRIL) tablet 2.5 mg  2.5 mg Oral BID Salvatore Miller MD   2.5 mg at 11/18/23 0943    mupirocin (BACTROBAN) 2 % nasal ointment 1 application   1 application  Each Nare BID Salvatore Miller MD        naloxone (NARCAN) injection 0.4 mg  0.4 mg Intravenous Q5 Min PRN Kwame Villagomez DO        niCARdipine (CARDENE) 25 mg in 250 mL NS infusion kit  5-15 mg/hr Intravenous Titrated Jane Armstrong MD   Stopped at 11/17/23 1051    nitroglycerin (NITROSTAT) SL tablet 0.4 mg  0.4 mg Sublingual Q5 Min PRN Bicking, Kwame TENORIO DO        ondansetron (ZOFRAN) injection 4 mg  4 mg Intravenous Q6H PRN Bic, Kwame TENORIO DO        pantoprazole (PROTONIX) EC tablet 40 mg  40 mg Oral Q AM BickingKwame DO   40 mg at 11/18/23 0954    sodium chloride 0.9 % flush 10 mL   10 mL Intravenous Q12H Kwame Villagomez DO   10 mL at 23 0943    sodium chloride 0.9 % flush 10 mL  10 mL Intravenous PRN Kwame Villagomez DO           Past Medical History:  History reviewed. No pertinent past medical history.    Past Surgical History:  History reviewed. No pertinent surgical history.    Family History  History reviewed. No pertinent family history.    Social History  Social History     Socioeconomic History    Marital status: Single   Tobacco Use    Smoking status: Former     Types: Cigarettes     Quit date: 2021     Years since quittin.5    Smokeless tobacco: Never   Vaping Use    Vaping Use: Never used   Substance and Sexual Activity    Alcohol use: Never    Drug use: Never       Review of Systems:  History obtained from chart review and the patient  General ROS: No fever or chills  Respiratory ROS: No cough, shortness of breath, wheezing  Cardiovascular ROS: No chest pain or palpitations  Gastrointestinal ROS: No abdominal pain or melena  Genito-Urinary ROS: No dysuria or hematuria  Psych ROS: No anxiety and depression  14 point ROS reviewed with the patient and negative except as noted above and in the HPI unless unable to obtain.    Objective:  Patient Vitals for the past 24 hrs:   BP Temp Temp src Pulse Resp SpO2   23 1600 170/68 98.3 °F (36.8 °C) Oral 67 17 100 %   23 1500 158/60 -- -- 67 -- 94 %   23 1400 140/63 -- -- 69 -- --   23 1300 (!) 160/115 -- -- 85 13 100 %   23 1200 155/67 -- -- 69 17 96 %   23 1100 176/67 -- -- 75 20 96 %   23 1000 175/68 -- -- 74 20 96 %   23 0900 (!) 190/69 -- -- 75 20 96 %   23 0800 175/75 -- -- 74 20 100 %   23 0700 172/71 98 °F (36.7 °C) Oral 74 18 100 %   23 0400 (!) 191/99 98 °F (36.7 °C) Oral 69 -- --   23 0300 (!) 152/114 -- -- 75 -- 98 %   23 0200 178/74 -- -- 68 -- 98 %   23 0100 160/62 -- -- 68 -- --   23 0000 166/56 98.1 °F (36.7 °C) Oral 74  -- 100 %   11/17/23 2300 (!) 149/126 -- -- 77 -- 98 %   11/17/23 2200 154/84 -- -- 78 -- 99 %   11/17/23 2105 141/54 -- -- 81 -- --   11/17/23 2000 158/59 98 °F (36.7 °C) Oral 75 -- 96 %   11/17/23 1800 154/88 -- -- 80 17 98 %       Intake/Output Summary (Last 24 hours) at 11/18/2023 1708  Last data filed at 11/18/2023 1400  Gross per 24 hour   Intake 400 ml   Output --   Net 400 ml     General: awake/alert   Chest:  clear to auscultation bilaterally without respiratory distress  CVS: regular rate and rhythm  Abdominal: soft, nontender, positive bowel sounds  Extremities: no cyanosis or edema  Skin: warm and dry without rash      Labs:  Results from last 7 days   Lab Units 11/18/23  0655 11/17/23  0825 11/16/23  0627   WBC 10*3/mm3 8.52 7.91 6.61   HEMOGLOBIN g/dL 10.0* 10.4* 9.7*   HEMATOCRIT % 31.9* 33.5* 31.8*   PLATELETS 10*3/mm3 188 217 217         Results from last 7 days   Lab Units 11/18/23  0655 11/17/23  0825 11/16/23  0627 11/15/23  1158   SODIUM mmol/L 135* 135* 142 141   POTASSIUM mmol/L 4.2 3.9 4.7 4.2   CHLORIDE mmol/L 96* 95* 106 105   CO2 mmol/L 26.0 24.0 24.0 25.0   BUN mg/dL 27* 26* 51* 49*   CREATININE mg/dL 2.99* 2.80* 4.46* 4.31*   CALCIUM mg/dL 9.2 7.9* 9.0 9.5   EGFR mL/min/1.73 16.0* 17.3* 9.9* 10.3*   BILIRUBIN mg/dL  --   --   --  0.3   ALK PHOS U/L  --   --   --  130*   ALT (SGPT) U/L  --   --   --  7   AST (SGOT) U/L  --   --   --  12   GLUCOSE mg/dL 212* 211* 154* 139*       Radiology:   Imaging Results (Last 72 Hours)       ** No results found for the last 72 hours. **            Culture:  Blood Culture   Date Value Ref Range Status   11/15/2023 No growth at 24 hours  Preliminary   11/15/2023 No growth at 24 hours  Preliminary         Assessment    End stage renal disease on HD MWF  Type 2 diabetes  Hypertension  Peripheral vascular disease  Anemia of CKD  Acute delirium--improving    Plan:  Angiogram next week  Dialysis next on 11/20  Monitor labs      Michael Wright,  MD  11/18/2023  17:08 CST

## 2023-11-18 NOTE — PLAN OF CARE
Goal Outcome Evaluation:              Outcome Evaluation: Pt confused and can be non compliant. Requires extra explanation and redirection to take meds and comply with treatments. VSS, LLE pain noted. Restraints in place.

## 2023-11-18 NOTE — NURSING NOTE
Pt noted to be having some confused speech, but is able to answer orientation questions appropriately.   Pt noted to be having stabbing pain and crawling sensation to LLE. Pt resting after dilaudid administration.

## 2023-11-18 NOTE — NURSING NOTE
Pt attempting to get out of bed  and go home because she needs to check on her mother. Attempted to reorient patient. She became more agitated and stated that she needed to use the restroom. Refuses bedpan. Refuses to let us straighten pad under her. Pt pulled up in bed and  repositioned. Dilaudid given for pain rated 10/10. Will monitor.

## 2023-11-19 LAB
ANION GAP SERPL CALCULATED.3IONS-SCNC: 13 MMOL/L (ref 5–15)
APTT PPP: 108.9 SECONDS (ref 24.5–36)
APTT PPP: 54.5 SECONDS (ref 24.5–36)
APTT PPP: 93.2 SECONDS (ref 24.5–36)
BASOPHILS # BLD AUTO: 0.05 10*3/MM3 (ref 0–0.2)
BASOPHILS NFR BLD AUTO: 0.6 % (ref 0–1.5)
BUN SERPL-MCNC: 42 MG/DL (ref 8–23)
BUN/CREAT SERPL: 10.9 (ref 7–25)
CALCIUM SPEC-SCNC: 9.6 MG/DL (ref 8.6–10.5)
CHLORIDE SERPL-SCNC: 98 MMOL/L (ref 98–107)
CO2 SERPL-SCNC: 27 MMOL/L (ref 22–29)
CREAT SERPL-MCNC: 3.85 MG/DL (ref 0.57–1)
DEPRECATED RDW RBC AUTO: 49.8 FL (ref 37–54)
EGFRCR SERPLBLD CKD-EPI 2021: 11.8 ML/MIN/1.73
EOSINOPHIL # BLD AUTO: 0.15 10*3/MM3 (ref 0–0.4)
EOSINOPHIL NFR BLD AUTO: 1.8 % (ref 0.3–6.2)
ERYTHROCYTE [DISTWIDTH] IN BLOOD BY AUTOMATED COUNT: 13.5 % (ref 12.3–15.4)
GLUCOSE BLDC GLUCOMTR-MCNC: 142 MG/DL (ref 70–130)
GLUCOSE BLDC GLUCOMTR-MCNC: 320 MG/DL (ref 70–130)
GLUCOSE BLDC GLUCOMTR-MCNC: 327 MG/DL (ref 70–130)
GLUCOSE BLDC GLUCOMTR-MCNC: 81 MG/DL (ref 70–130)
GLUCOSE SERPL-MCNC: 236 MG/DL (ref 65–99)
HCT VFR BLD AUTO: 33 % (ref 34–46.6)
HGB BLD-MCNC: 10.3 G/DL (ref 12–15.9)
IMM GRANULOCYTES # BLD AUTO: 0.03 10*3/MM3 (ref 0–0.05)
IMM GRANULOCYTES NFR BLD AUTO: 0.4 % (ref 0–0.5)
LYMPHOCYTES # BLD AUTO: 1.88 10*3/MM3 (ref 0.7–3.1)
LYMPHOCYTES NFR BLD AUTO: 23.2 % (ref 19.6–45.3)
MCH RBC QN AUTO: 31.1 PG (ref 26.6–33)
MCHC RBC AUTO-ENTMCNC: 31.2 G/DL (ref 31.5–35.7)
MCV RBC AUTO: 99.7 FL (ref 79–97)
MONOCYTES # BLD AUTO: 1.08 10*3/MM3 (ref 0.1–0.9)
MONOCYTES NFR BLD AUTO: 13.3 % (ref 5–12)
NEUTROPHILS NFR BLD AUTO: 4.92 10*3/MM3 (ref 1.7–7)
NEUTROPHILS NFR BLD AUTO: 60.7 % (ref 42.7–76)
NRBC BLD AUTO-RTO: 0 /100 WBC (ref 0–0.2)
PLATELET # BLD AUTO: 206 10*3/MM3 (ref 140–450)
PMV BLD AUTO: 11.1 FL (ref 6–12)
POTASSIUM SERPL-SCNC: 4.1 MMOL/L (ref 3.5–5.2)
RBC # BLD AUTO: 3.31 10*6/MM3 (ref 3.77–5.28)
SODIUM SERPL-SCNC: 138 MMOL/L (ref 136–145)
WBC NRBC COR # BLD AUTO: 8.11 10*3/MM3 (ref 3.4–10.8)

## 2023-11-19 PROCEDURE — 80048 BASIC METABOLIC PNL TOTAL CA: CPT | Performed by: FAMILY MEDICINE

## 2023-11-19 PROCEDURE — 82948 REAGENT STRIP/BLOOD GLUCOSE: CPT

## 2023-11-19 PROCEDURE — 25010000002 HEPARIN (PORCINE) 25000-0.45 UT/250ML-% SOLUTION: Performed by: FAMILY MEDICINE

## 2023-11-19 PROCEDURE — 85730 THROMBOPLASTIN TIME PARTIAL: CPT | Performed by: FAMILY MEDICINE

## 2023-11-19 PROCEDURE — 63710000001 INSULIN LISPRO (HUMAN) PER 5 UNITS: Performed by: FAMILY MEDICINE

## 2023-11-19 PROCEDURE — 25010000002 HYDROMORPHONE PER 4 MG: Performed by: SURGERY

## 2023-11-19 PROCEDURE — 85025 COMPLETE CBC W/AUTO DIFF WBC: CPT | Performed by: FAMILY MEDICINE

## 2023-11-19 PROCEDURE — 25010000002 HYDRALAZINE PER 20 MG: Performed by: HOSPITALIST

## 2023-11-19 PROCEDURE — 25010000002 HEPARIN (PORCINE) PER 1000 UNITS: Performed by: SURGERY

## 2023-11-19 RX ADMIN — HYDROMORPHONE HYDROCHLORIDE 0.5 MG: 1 INJECTION, SOLUTION INTRAMUSCULAR; INTRAVENOUS; SUBCUTANEOUS at 16:50

## 2023-11-19 RX ADMIN — HYDROCODONE BITARTRATE AND ACETAMINOPHEN 1 TABLET: 5; 325 TABLET ORAL at 12:27

## 2023-11-19 RX ADMIN — LISINOPRIL 2.5 MG: 2.5 TABLET ORAL at 21:19

## 2023-11-19 RX ADMIN — Medication 1 APPLICATION: at 21:19

## 2023-11-19 RX ADMIN — HYDRALAZINE HYDROCHLORIDE 10 MG: 20 INJECTION INTRAMUSCULAR; INTRAVENOUS at 07:59

## 2023-11-19 RX ADMIN — HEPARIN SODIUM 17 UNITS/KG/HR: 10000 INJECTION, SOLUTION INTRAVENOUS at 04:20

## 2023-11-19 RX ADMIN — HEPARIN SODIUM 3400 UNITS: 1000 INJECTION, SOLUTION INTRAVENOUS; SUBCUTANEOUS at 13:06

## 2023-11-19 RX ADMIN — CHLORHEXIDINE GLUCONATE 1 APPLICATION: 500 CLOTH TOPICAL at 04:27

## 2023-11-19 RX ADMIN — HYDROMORPHONE HYDROCHLORIDE 0.5 MG: 1 INJECTION, SOLUTION INTRAMUSCULAR; INTRAVENOUS; SUBCUTANEOUS at 00:16

## 2023-11-19 RX ADMIN — HYDROMORPHONE HYDROCHLORIDE 0.5 MG: 1 INJECTION, SOLUTION INTRAMUSCULAR; INTRAVENOUS; SUBCUTANEOUS at 22:38

## 2023-11-19 RX ADMIN — HYDROMORPHONE HYDROCHLORIDE 0.5 MG: 1 INJECTION, SOLUTION INTRAMUSCULAR; INTRAVENOUS; SUBCUTANEOUS at 04:27

## 2023-11-19 RX ADMIN — LISINOPRIL 2.5 MG: 2.5 TABLET ORAL at 08:07

## 2023-11-19 RX ADMIN — DOCUSATE SODIUM 50 MG AND SENNOSIDES 8.6 MG 2 TABLET: 8.6; 5 TABLET, FILM COATED ORAL at 21:19

## 2023-11-19 RX ADMIN — Medication 10 ML: at 11:24

## 2023-11-19 RX ADMIN — ASPIRIN 81 MG: 81 TABLET, CHEWABLE ORAL at 08:06

## 2023-11-19 RX ADMIN — INSULIN LISPRO 7 UNITS: 100 INJECTION, SOLUTION INTRAVENOUS; SUBCUTANEOUS at 07:56

## 2023-11-19 RX ADMIN — INSULIN LISPRO 7 UNITS: 100 INJECTION, SOLUTION INTRAVENOUS; SUBCUTANEOUS at 16:48

## 2023-11-19 RX ADMIN — HYDROMORPHONE HYDROCHLORIDE 0.5 MG: 1 INJECTION, SOLUTION INTRAMUSCULAR; INTRAVENOUS; SUBCUTANEOUS at 11:24

## 2023-11-19 RX ADMIN — HEPARIN SODIUM 3400 UNITS: 1000 INJECTION, SOLUTION INTRAVENOUS; SUBCUTANEOUS at 00:15

## 2023-11-19 RX ADMIN — Medication 10 ML: at 21:19

## 2023-11-19 RX ADMIN — PANTOPRAZOLE SODIUM 40 MG: 40 TABLET, DELAYED RELEASE ORAL at 06:40

## 2023-11-19 NOTE — PROGRESS NOTES
Nephrology ([facility]) Progress Note      Patient:  Harmony Montoya  YOB: 1950  Date of Service: 11/19/2023  MRN: [id number]   Acct: [id number]   Primary Care Physician: Provider, No Known  Advance Directive:   Code Status and Medical Interventions:   Ordered at: 11/15/23 4016     Code Status (Patient has no pulse and is not breathing):    CPR (Attempt to Resuscitate)     Medical Interventions (Patient has pulse or is breathing):    Full Support     Admit Date: 11/15/2023       Hospital Day: 4  Referring Provider: No ref. provider found      Patient personally seen and examined.  Complete chart including Consults, Notes, Operative Reports, Labs, Cardiology, and Radiology studies reviewed as able.        Subjective:  Harmony Montoya is a 73 y.o. female for whom we were consulted for evaluation and treatment of end stage renal disease. Maintenance hemodialysis Monday Wednesday Friday at [facility]. Missed HD on 11/15. History of type 2 diabetes and hypertension. Presented with pain in left foot. Admitted for vascular evaluation. Underwent make-up dialysis on 11/16. Tolerated treatment well but later developed some confusion and was moved to CCU overnight.    Today is awake. Planning for angiogram next week. Patient is not giving clear answer if she really wants to go for it. She tells me she received dialysis on 11/18 and that her next treatment should be 11/21. She appeared clearly confused.     Allergies:  Statins    Home Meds:  Medications Prior to Admission   Medication Sig Dispense Refill Last Dose    Ascorbic Acid (Vitamin C) 500 MG capsule Take 2 capsules by mouth Daily.   11/15/2023    aspirin 81 MG chewable tablet Chew 1 tablet Daily.   11/14/2023    carvedilol (COREG) 25 MG tablet Take 1 tablet by mouth 2 (Two) Times a Day With Meals.   11/15/2023    clopidogrel (PLAVIX) 75 MG tablet Take 1 tablet by mouth Daily.   11/14/2023    diphenhydrAMINE (BENADRYL) 25 mg  capsule Take 1 capsule by mouth Daily.   11/14/2023    insulin glargine (LANTUS, SEMGLEE) 100 UNIT/ML injection Inject 7 Units under the skin into the appropriate area as directed Daily.   11/15/2023    lisinopril (PRINIVIL,ZESTRIL) 20 MG tablet Take 0.5 tablets by mouth Daily.   11/15/2023    omeprazole (priLOSEC) 40 MG capsule Take 1 capsule by mouth Daily.   11/15/2023    vitamin D3 125 MCG (5000 UT) capsule capsule Take 1 capsule by mouth Daily.   11/15/2023    insulin regular (humuLIN R,novoLIN R) 100 UNIT/ML injection Inject 6 Units under the skin into the appropriate area as directed Daily Before Supper.       Polyvinyl Alcohol-Povidone PF (HYPOTEARS) 1.4-0.6 % ophthalmic solution Administer 1 drop to both eyes 4 (Four) Times a Day.          Medicines:  Current Facility-Administered Medications   Medication Dose Route Frequency Provider Last Rate Last Admin    acetaminophen (TYLENOL) tablet 650 mg  650 mg Oral Q6H PRN Bicking, Kwame TENORIO, DO        aspirin chewable tablet 81 mg  81 mg Oral Daily Bicking, Kwame K, DO   81 mg at 11/19/23 0806    sennosides-docusate (PERICOLACE) 8.6-50 MG per tablet 2 tablet  2 tablet Oral BID BickingKwame, DO   2 tablet at 11/18/23 0943    And    polyethylene glycol (MIRALAX) packet 17 g  17 g Oral Daily PRN Bicking, Kwame K, DO        And    bisacodyl (DULCOLAX) EC tablet 5 mg  5 mg Oral Daily PRN BickingKwame, DO        And    bisacodyl (DULCOLAX) suppository 10 mg  10 mg Rectal Daily PRN Bicking, Kwame K, DO        ceFAZolin (ANCEF) IVPB 1,000 mg  1,000 mg Intravenous Once Jessi Gallardo APRN        Chlorhexidine Gluconate Cloth 2 % pads 1 application   1 application  Topical Once Salvatore Miller MD        Chlorhexidine Gluconate Cloth 2 % pads 1 application   1 application  Topical Q24H Salvatore Miller MD   1 application  at 11/19/23 0427    dextrose (D50W) (25 g/50 mL) IV injection 25 g  25 g Intravenous Q15 Min PRN Salvatore Miller  MD Kishore        dextrose (GLUTOSE) oral gel 15 g  15 g Oral Q15 Min PRN Salvatore Miller MD        glucagon (GLUCAGEN) injection 1 mg  1 mg Intramuscular Q15 Min PRN Salvatore Miller MD        heparin (porcine) injection 1,700 Units  30 Units/kg Intravenous PRN Kwame Villagomez DO        heparin (porcine) injection 3,400 Units  60 Units/kg Intravenous PRN Kwame Villagomez DO   3,400 Units at 11/19/23 1306    heparin 97536 units/250 mL (100 units/mL) in 0.45 % NaCl infusion  12 Units/kg/hr Intravenous Titrated Salvatore Miller MD 11.07 mL/hr at 11/19/23 1315 19.524 Units/kg/hr at 11/19/23 1315    hydrALAZINE (APRESOLINE) injection 10 mg  10 mg Intravenous Q6H PRN Jane Armstrong MD   10 mg at 11/19/23 0759    HYDROcodone-acetaminophen (NORCO) 5-325 MG per tablet 1 tablet  1 tablet Oral Q4H PRN Kwame Villagomez DO   1 tablet at 11/19/23 1227    HYDROmorphone (DILAUDID) injection 0.5 mg  0.5 mg Intravenous Q4H PRN Kwame Villagomez DO   0.5 mg at 11/19/23 1124    Insulin Lispro (humaLOG) injection 2-9 Units  2-9 Units Subcutaneous 4x Daily AC & at Bedtime Salvatore Miller MD   7 Units at 11/19/23 0756    labetalol (NORMODYNE,TRANDATE) injection 10 mg  10 mg Intravenous Q4H PRN Jane Armstrong MD   10 mg at 11/17/23 0353    lisinopril (PRINIVIL,ZESTRIL) tablet 2.5 mg  2.5 mg Oral BID Salvatore Miller MD   2.5 mg at 11/19/23 0807    mupirocin (BACTROBAN) 2 % nasal ointment 1 application   1 application  Each Nare BID Salvatore Miller MD   1 application  at 11/18/23 2041    naloxone (NARCAN) injection 0.4 mg  0.4 mg Intravenous Q5 Min PRN Kwame Villagomez, DO        niCARdipine (CARDENE) 25 mg in 250 mL NS infusion kit  5-15 mg/hr Intravenous Titrated Jane Armstrong MD   Stopped at 11/17/23 1051    nitroglycerin (NITROSTAT) SL tablet 0.4 mg  0.4 mg Sublingual Q5 Min PRN Kwame Villagomez,         ondansetron (ZOFRAN) injection 4 mg  4 mg  Intravenous Q6H PRN Kwame Villagomez, DO        pantoprazole (PROTONIX) EC tablet 40 mg  40 mg Oral Q AM Kwame Villagomez,    40 mg at 23 0640    sodium chloride 0.9 % flush 10 mL  10 mL Intravenous Q12H Kwame Villagomez, DO   10 mL at 23 1124    sodium chloride 0.9 % flush 10 mL  10 mL Intravenous PRN Kwame Villagomez, DO           Past Medical History:  History reviewed. No pertinent past medical history.    Past Surgical History:  History reviewed. No pertinent surgical history.    Family History  History reviewed. No pertinent family history.    Social History  Social History     Socioeconomic History    Marital status: Single   Tobacco Use    Smoking status: Former     Types: Cigarettes     Quit date: 2021     Years since quittin.5    Smokeless tobacco: Never   Vaping Use    Vaping Use: Never used   Substance and Sexual Activity    Alcohol use: Never    Drug use: Never       Review of Systems:  History obtained from chart review and the patient  General ROS: No fever or chills  Respiratory ROS: No cough, shortness of breath, wheezing  Cardiovascular ROS: No chest pain or palpitations  Gastrointestinal ROS: No abdominal pain or melena  Genito-Urinary ROS: No dysuria or hematuria  Psych ROS: No anxiety and depression  14 point ROS reviewed with the patient and negative except as noted above and in the HPI unless unable to obtain.    Objective:  Patient Vitals for the past 24 hrs:   BP Temp Temp src Pulse Resp SpO2 Weight   23 1550 -- (P) 98.5 °F (36.9 °C) (P) Oral -- -- -- --   23 1500 170/68 -- -- 71 -- 98 % --   23 1400 150/76 -- -- 81 -- -- --   23 1300 153/55 -- -- 65 -- -- --   23 1200 157/67 -- -- 78 -- -- --   23 1120 -- 98.8 °F (37.1 °C) Axillary 77 -- 99 % --   23 1115 158/62 -- -- 77 -- -- --   23 1100 168/65 -- -- -- -- -- --   23 1030 157/85 -- -- 80 -- -- --   23 1015 167/60 -- -- 75 -- -- --   23 1000  154/69 -- -- 82 -- -- --   11/19/23 0945 156/64 -- -- 85 -- -- --   11/19/23 0930 169/82 -- -- 89 -- -- --   11/19/23 0915 160/68 -- -- 90 -- -- --   11/19/23 0900 170/65 -- -- 83 -- -- --   11/19/23 0845 158/64 -- -- 95 -- -- --   11/19/23 0800 (!) 189/73 98.2 °F (36.8 °C) Oral 85 -- -- --   11/19/23 0700 (!) 182/65 -- -- 78 -- -- --   11/19/23 0600 158/54 -- -- 60 -- 99 % --   11/19/23 0500 177/63 -- -- 70 -- 96 % --   11/19/23 0400 (!) 183/68 97.9 °F (36.6 °C) Oral 61 -- (!) 69 % 59.6 kg (131 lb 6.4 oz)   11/19/23 0300 (!) 183/65 -- -- 78 -- -- --   11/19/23 0200 171/65 -- -- 60 -- (!) 69 % --   11/19/23 0100 166/65 -- -- 75 -- (!) 76 % --   11/19/23 0000 175/58 98.3 °F (36.8 °C) Oral 63 -- 96 % --   11/18/23 2300 160/67 -- -- 64 -- -- --   11/18/23 2200 157/67 -- -- 71 -- (!) 80 % --   11/18/23 2100 143/64 -- -- 66 -- 95 % --   11/18/23 2000 167/58 98 °F (36.7 °C) Oral 70 -- (!) 85 % --   11/18/23 1800 144/71 -- -- 78 -- -- --   11/18/23 1700 (!) 162/135 -- -- 67 -- (!) 81 % --   11/18/23 1600 170/68 98.3 °F (36.8 °C) Oral 67 17 100 % --       Intake/Output Summary (Last 24 hours) at 11/19/2023 1559  Last data filed at 11/19/2023 1550  Gross per 24 hour   Intake 644.95 ml   Output 150 ml   Net 494.95 ml     General: awake/alert   Chest:  clear to auscultation bilaterally without respiratory distress  CVS: regular rate and rhythm  Abdominal: soft, nontender, positive bowel sounds  Extremities: no cyanosis or edema  Skin: warm and dry without rash      Labs:  Results from last 7 days   Lab Units 11/19/23  0651 11/18/23  0655 11/17/23  0825   WBC 10*3/mm3 8.11 8.52 7.91   HEMOGLOBIN g/dL 10.3* 10.0* 10.4*   HEMATOCRIT % 33.0* 31.9* 33.5*   PLATELETS 10*3/mm3 206 188 217         Results from last 7 days   Lab Units 11/19/23  0651 11/18/23  0655 11/17/23  0825 11/16/23  0627 11/15/23  1158   SODIUM mmol/L 138 135* 135*   < > 141   POTASSIUM mmol/L 4.1 4.2 3.9   < > 4.2   CHLORIDE mmol/L 98 96* 95*   < > 105   CO2  mmol/L 27.0 26.0 24.0   < > 25.0   BUN mg/dL 42* 27* 26*   < > 49*   CREATININE mg/dL 3.85* 2.99* 2.80*   < > 4.31*   CALCIUM mg/dL 9.6 9.2 7.9*   < > 9.5   EGFR mL/min/1.73 11.8* 16.0* 17.3*   < > 10.3*   BILIRUBIN mg/dL  --   --   --   --  0.3   ALK PHOS U/L  --   --   --   --  130*   ALT (SGPT) U/L  --   --   --   --  7   AST (SGOT) U/L  --   --   --   --  12   GLUCOSE mg/dL 236* 212* 211*   < > 139*    < > = values in this interval not displayed.       Radiology:   Imaging Results (Last 72 Hours)       ** No results found for the last 72 hours. **            Culture:  Blood Culture   Date Value Ref Range Status   11/15/2023 No growth at 24 hours  Preliminary   11/15/2023 No growth at 24 hours  Preliminary         Assessment    End stage renal disease on HD MWF  Type 2 diabetes  Hypertension  Peripheral vascular disease  Anemia of CKD  Acute delirium--improving  Metabolic encephalopathy    Plan:  Angiogram next week (if she agrees)  Dialysis next on 11/20  Monitor labs      Michael Wright MD  11/19/2023  15:59 CST

## 2023-11-19 NOTE — PROGRESS NOTES
[facility] Medicine Services  INPATIENT PROGRESS NOTE    Patient Name: Harmony Montoya  Date of Admission: 11/15/2023  Today's Date: 11/19/23  Length of Stay: 4  Primary Care Physician: Provider, No Known    Subjective   Chief Complaint: Left leg pain  HPI   Remains pleasantly confused.      11/18 Patient seen in critical care, she is alert to person and place. Responds appropriately today.   11/17 Patient is confused and pulling IVs after HR today. Has received Ativan with partial effect.     Review of Systems   All pertinent negatives and positives are as above. All other systems have been reviewed and are negative unless otherwise stated.     Objective    Temp:  [97.9 °F (36.6 °C)-98.3 °F (36.8 °C)] 98.2 °F (36.8 °C)  Heart Rate:  [60-95] 80  Resp:  [13-17] 17  BP: (140-189)/() 157/85  Physical Exam  Vitals reviewed.   Constitutional:       General: She is not in acute distress.     Appearance: She is well-developed. She is not toxic-appearing.   HENT:      Head: Normocephalic and atraumatic.      Right Ear: External ear normal.      Left Ear: External ear normal.      Mouth/Throat:      Mouth: Mucous membranes are dry.      Pharynx: Oropharynx is clear.   Eyes:      General:         Right eye: No discharge.         Left eye: No discharge.      Extraocular Movements: Extraocular movements intact.      Conjunctiva/sclera: Conjunctivae normal.      Pupils: Pupils are equal, round, and reactive to light.   Neck:      Vascular: No JVD.   Cardiovascular:      Rate and Rhythm: Normal rate and regular rhythm.      Heart sounds: Normal heart sounds.   Pulmonary:      Effort: Pulmonary effort is normal. No respiratory distress.      Breath sounds: No stridor. No wheezing, rhonchi or rales.   Chest:      Chest wall: No tenderness.   Abdominal:      General: Bowel sounds are normal. There is no distension.      Palpations: Abdomen is soft.      Tenderness: There is no abdominal  "tenderness. There is no guarding or rebound.      Hernia: No hernia is present.   Musculoskeletal:         General: No swelling, tenderness or deformity. Normal range of motion.      Cervical back: Normal range of motion and neck supple. No rigidity or tenderness. No muscular tenderness.      Right lower leg: No edema.      Left lower leg: No edema.   Skin:     General: Skin is dry.      Findings: No erythema or rash.      Comments: Distal cyanosis and atrophy legs L>R   Neurological:      General: No focal deficit present.      Mental Status: She is alert. She is disoriented.      Cranial Nerves: No cranial nerve deficit.      Sensory: No sensory deficit.      Motor: No weakness or abnormal muscle tone.      Deep Tendon Reflexes: Reflexes normal.     Results Review:  I have reviewed the labs, radiology results, and diagnostic studies.    Laboratory Data:   Results from last 7 days   Lab Units 11/19/23  0651 11/18/23  0655 11/17/23  0825   WBC 10*3/mm3 8.11 8.52 7.91   HEMOGLOBIN g/dL 10.3* 10.0* 10.4*   HEMATOCRIT % 33.0* 31.9* 33.5*   PLATELETS 10*3/mm3 206 188 217        Results from last 7 days   Lab Units 11/19/23  0651 11/18/23  0655 11/17/23  0825 11/16/23  0627 11/15/23  1158   SODIUM mmol/L 138 135* 135*   < > 141   POTASSIUM mmol/L 4.1 4.2 3.9   < > 4.2   CHLORIDE mmol/L 98 96* 95*   < > 105   CO2 mmol/L 27.0 26.0 24.0   < > 25.0   BUN mg/dL 42* 27* 26*   < > 49*   CREATININE mg/dL 3.85* 2.99* 2.80*   < > 4.31*   CALCIUM mg/dL 9.6 9.2 7.9*   < > 9.5   BILIRUBIN mg/dL  --   --   --   --  0.3   ALK PHOS U/L  --   --   --   --  130*   ALT (SGPT) U/L  --   --   --   --  7   AST (SGOT) U/L  --   --   --   --  12   GLUCOSE mg/dL 236* 212* 211*   < > 139*    < > = values in this interval not displayed.       Culture Data:   No results found for: \"BLOODCX\", \"URINECX\", \"WOUNDCX\", \"MRSACX\", \"RESPCX\", \"STOOLCX\"    Radiology Data:   Imaging Results (Last 24 Hours)       ** No results found for the last 24 hours. ** "            I have reviewed the patient's current medications.     Assessment/Plan   Assessment  Active Hospital Problems    Diagnosis     **Ischemic rest pain of lower extremity     End stage renal disease on dialysis     Type 2 diabetes mellitus, with long-term current use of insulin     Primary hypertension     Coronary artery disease involving native coronary artery of native heart without angina pectoris     Chronic GERD    Acute delirium    Treatment Plan  Patient delirious this afternoon, at risk of falls and bleeding. Will place in critical care for safety.   Ativan IV prn agitation  Vitals per protocol  Diet cardiac ADA  Up with assistance, fall precautions  Pain control > Tylenol 650 > Lortab 5 > Dilaudid 0.5 IV q4h prn  Heparin drip protocol D for renal failure  Vascular surgery consult > plan for angiogram of LLE yesterday, but patient refused.      ESRD > Nephrology consult in place     DM > Hold Levemir  Check A1C, lipid panel  Humalog low dose sliding scale     HTN uncontrolled overnight > Cardene drip  Continue home medications Lisinopril     DVT prophylaxis > Patient currently fully anticoagulated    Medical Decision Making  Number and Complexity of problems: 4 complex medical problems  Differential Diagnosis: none     Conditions and Status        Condition is unchanged.     [facility] Data  External documents reviewed: [employer], care everywhere records  Cardiac tracing (EKG, telemetry) interpretation: EKG pending  Radiology interpretation: See above  Labs reviewed: See above  Any tests that were considered but not ordered: none     Decision rules/scores evaluated (example [id number], Wells, etc): N/A     Discussed with: Patient and nurse Flavio     Care Planning  Shared decision making: Patient  Code status and discussions: Full code     Disposition  Social Determinants of Health that impact treatment or disposition: none yet  I expect the patient to be discharged to home in 2-4 days.     Electronically  signed by Salvatore Miller MD, 11/19/23, 11:14 CST.

## 2023-11-20 ENCOUNTER — APPOINTMENT (OUTPATIENT)
Dept: CARDIOLOGY | Facility: HOSPITAL | Age: 73
DRG: 299 | End: 2023-11-20
Payer: OTHER GOVERNMENT

## 2023-11-20 LAB
ABO GROUP BLD: NORMAL
APTT PPP: 112.4 SECONDS (ref 24.5–36)
APTT PPP: 51.7 SECONDS (ref 24.5–36)
APTT PPP: 55.4 SECONDS (ref 24.5–36)
BACTERIA SPEC AEROBE CULT: NORMAL
BACTERIA SPEC AEROBE CULT: NORMAL
BLD GP AB SCN SERPL QL: NEGATIVE
GEN 5 2HR TROPONIN T REFLEX: 129 NG/L
GLUCOSE BLDC GLUCOMTR-MCNC: 138 MG/DL (ref 70–130)
GLUCOSE BLDC GLUCOMTR-MCNC: 192 MG/DL (ref 70–130)
GLUCOSE BLDC GLUCOMTR-MCNC: 226 MG/DL (ref 70–130)
GLUCOSE BLDC GLUCOMTR-MCNC: 358 MG/DL (ref 70–130)
GLUCOSE BLDC GLUCOMTR-MCNC: 380 MG/DL (ref 70–130)
QT INTERVAL: 386 MS
QTC INTERVAL: 477 MS
RH BLD: POSITIVE
T&S EXPIRATION DATE: NORMAL
TROPONIN T DELTA: 85 NG/L
TROPONIN T SERPL HS-MCNC: 44 NG/L

## 2023-11-20 PROCEDURE — 63710000001 INSULIN LISPRO (HUMAN) PER 5 UNITS: Performed by: FAMILY MEDICINE

## 2023-11-20 PROCEDURE — 86900 BLOOD TYPING SEROLOGIC ABO: CPT | Performed by: SURGERY

## 2023-11-20 PROCEDURE — 93306 TTE W/DOPPLER COMPLETE: CPT | Performed by: INTERNAL MEDICINE

## 2023-11-20 PROCEDURE — 99222 1ST HOSP IP/OBS MODERATE 55: CPT | Performed by: INTERNAL MEDICINE

## 2023-11-20 PROCEDURE — 84484 ASSAY OF TROPONIN QUANT: CPT | Performed by: HOSPITALIST

## 2023-11-20 PROCEDURE — 25010000002 LABETALOL 5 MG/ML SOLUTION: Performed by: HOSPITALIST

## 2023-11-20 PROCEDURE — 25810000003 SODIUM CHLORIDE 0.9 % SOLUTION: Performed by: HOSPITALIST

## 2023-11-20 PROCEDURE — 85730 THROMBOPLASTIN TIME PARTIAL: CPT | Performed by: FAMILY MEDICINE

## 2023-11-20 PROCEDURE — 25010000002 HYDROMORPHONE PER 4 MG: Performed by: SURGERY

## 2023-11-20 PROCEDURE — 97162 PT EVAL MOD COMPLEX 30 MIN: CPT

## 2023-11-20 PROCEDURE — 93306 TTE W/DOPPLER COMPLETE: CPT

## 2023-11-20 PROCEDURE — 93010 ELECTROCARDIOGRAM REPORT: CPT | Performed by: INTERNAL MEDICINE

## 2023-11-20 PROCEDURE — 25010000002 HEPARIN (PORCINE) PER 1000 UNITS: Performed by: SURGERY

## 2023-11-20 PROCEDURE — 82948 REAGENT STRIP/BLOOD GLUCOSE: CPT

## 2023-11-20 PROCEDURE — 36415 COLL VENOUS BLD VENIPUNCTURE: CPT | Performed by: FAMILY MEDICINE

## 2023-11-20 PROCEDURE — 25510000001 PERFLUTREN 6.52 MG/ML SUSPENSION: Performed by: FAMILY MEDICINE

## 2023-11-20 PROCEDURE — 25010000002 ONDANSETRON PER 1 MG: Performed by: SURGERY

## 2023-11-20 PROCEDURE — 93005 ELECTROCARDIOGRAM TRACING: CPT | Performed by: FAMILY MEDICINE

## 2023-11-20 PROCEDURE — 86850 RBC ANTIBODY SCREEN: CPT | Performed by: SURGERY

## 2023-11-20 PROCEDURE — 25010000002 HYDRALAZINE PER 20 MG: Performed by: HOSPITALIST

## 2023-11-20 PROCEDURE — 86901 BLOOD TYPING SEROLOGIC RH(D): CPT | Performed by: SURGERY

## 2023-11-20 PROCEDURE — 99232 SBSQ HOSP IP/OBS MODERATE 35: CPT | Performed by: SURGERY

## 2023-11-20 RX ORDER — CLONIDINE HYDROCHLORIDE 0.1 MG/1
0.1 TABLET ORAL EVERY 6 HOURS PRN
Status: DISCONTINUED | OUTPATIENT
Start: 2023-11-20 | End: 2023-11-24 | Stop reason: HOSPADM

## 2023-11-20 RX ORDER — ISOSORBIDE MONONITRATE 30 MG/1
30 TABLET, EXTENDED RELEASE ORAL
Status: DISCONTINUED | OUTPATIENT
Start: 2023-11-20 | End: 2023-11-24 | Stop reason: HOSPADM

## 2023-11-20 RX ORDER — AMLODIPINE BESYLATE 5 MG/1
5 TABLET ORAL
Status: DISCONTINUED | OUTPATIENT
Start: 2023-11-20 | End: 2023-11-24 | Stop reason: HOSPADM

## 2023-11-20 RX ADMIN — CHLORHEXIDINE GLUCONATE 1 APPLICATION: 500 CLOTH TOPICAL at 04:13

## 2023-11-20 RX ADMIN — INSULIN LISPRO 4 UNITS: 100 INJECTION, SOLUTION INTRAVENOUS; SUBCUTANEOUS at 17:16

## 2023-11-20 RX ADMIN — SODIUM CHLORIDE 5 MG/HR: 9 INJECTION, SOLUTION INTRAVENOUS at 16:55

## 2023-11-20 RX ADMIN — DOCUSATE SODIUM 50 MG AND SENNOSIDES 8.6 MG 2 TABLET: 8.6; 5 TABLET, FILM COATED ORAL at 21:10

## 2023-11-20 RX ADMIN — LISINOPRIL 2.5 MG: 2.5 TABLET ORAL at 21:10

## 2023-11-20 RX ADMIN — ONDANSETRON 4 MG: 2 INJECTION INTRAMUSCULAR; INTRAVENOUS at 01:26

## 2023-11-20 RX ADMIN — PERFLUTREN 1.17 MG: 6.52 INJECTION, SUSPENSION INTRAVENOUS at 11:17

## 2023-11-20 RX ADMIN — NITROGLYCERIN 0.4 MG: 0.4 TABLET SUBLINGUAL at 04:13

## 2023-11-20 RX ADMIN — CLONIDINE HYDROCHLORIDE 0.1 MG: 0.1 TABLET ORAL at 15:46

## 2023-11-20 RX ADMIN — HYDROMORPHONE HYDROCHLORIDE 0.5 MG: 1 INJECTION, SOLUTION INTRAMUSCULAR; INTRAVENOUS; SUBCUTANEOUS at 23:22

## 2023-11-20 RX ADMIN — INSULIN LISPRO 8 UNITS: 100 INJECTION, SOLUTION INTRAVENOUS; SUBCUTANEOUS at 07:29

## 2023-11-20 RX ADMIN — NITROGLYCERIN 0.4 MG: 0.4 TABLET SUBLINGUAL at 04:37

## 2023-11-20 RX ADMIN — Medication 10 ML: at 21:10

## 2023-11-20 RX ADMIN — SODIUM CHLORIDE 5 MG/HR: 9 INJECTION, SOLUTION INTRAVENOUS at 23:53

## 2023-11-20 RX ADMIN — HYDRALAZINE HYDROCHLORIDE 10 MG: 20 INJECTION INTRAMUSCULAR; INTRAVENOUS at 00:33

## 2023-11-20 RX ADMIN — LABETALOL HYDROCHLORIDE 10 MG: 5 INJECTION INTRAVENOUS at 00:49

## 2023-11-20 RX ADMIN — HYDROMORPHONE HYDROCHLORIDE 0.5 MG: 1 INJECTION, SOLUTION INTRAMUSCULAR; INTRAVENOUS; SUBCUTANEOUS at 02:26

## 2023-11-20 RX ADMIN — SODIUM CHLORIDE 5 MG/HR: 9 INJECTION, SOLUTION INTRAVENOUS at 02:38

## 2023-11-20 RX ADMIN — HEPARIN SODIUM 3400 UNITS: 1000 INJECTION, SOLUTION INTRAVENOUS; SUBCUTANEOUS at 00:55

## 2023-11-20 RX ADMIN — Medication 1 APPLICATION: at 21:10

## 2023-11-20 RX ADMIN — Medication 1 APPLICATION: at 11:59

## 2023-11-20 RX ADMIN — Medication 10 ML: at 12:13

## 2023-11-20 RX ADMIN — INSULIN LISPRO 2 UNITS: 100 INJECTION, SOLUTION INTRAVENOUS; SUBCUTANEOUS at 21:16

## 2023-11-20 RX ADMIN — INSULIN LISPRO 8 UNITS: 100 INJECTION, SOLUTION INTRAVENOUS; SUBCUTANEOUS at 04:36

## 2023-11-20 RX ADMIN — SODIUM CHLORIDE 5 MG/HR: 9 INJECTION, SOLUTION INTRAVENOUS at 12:10

## 2023-11-20 NOTE — PLAN OF CARE
Goal Outcome Evaluation:  Plan of Care Reviewed With: patient, sibling        Progress: no change  Outcome Evaluation: VSS. Orientation frequently changed throughout shift. Consent for angiogram signed by POA. BP treated with PRNs. Heparin drip infusing. Needed coaxing with most treatment. Safety maintained.

## 2023-11-20 NOTE — PLAN OF CARE
Problem: Adult Inpatient Plan of Care  Goal: Plan of Care Review  Outcome: Ongoing, Progressing  Goal: Patient-Specific Goal (Individualized)  Outcome: Ongoing, Progressing  Goal: Absence of Hospital-Acquired Illness or Injury  Outcome: Ongoing, Progressing  Intervention: Identify and Manage Fall Risk  Recent Flowsheet Documentation  Taken 11/20/2023 0600 by Daija Cortes RN  Safety Promotion/Fall Prevention: safety round/check completed  Taken 11/20/2023 0500 by Daija Cortes RN  Safety Promotion/Fall Prevention: safety round/check completed  Taken 11/20/2023 0400 by Daija Cortes RN  Safety Promotion/Fall Prevention: safety round/check completed  Taken 11/20/2023 0300 by Daija Cortes RN  Safety Promotion/Fall Prevention: safety round/check completed  Taken 11/20/2023 0200 by Daija Cortes RN  Safety Promotion/Fall Prevention: safety round/check completed  Taken 11/20/2023 0100 by Daija Cortes RN  Safety Promotion/Fall Prevention: safety round/check completed  Taken 11/20/2023 0000 by Daija Cortes RN  Safety Promotion/Fall Prevention: safety round/check completed  Taken 11/19/2023 2300 by Daija Cortes RN  Safety Promotion/Fall Prevention: safety round/check completed  Taken 11/19/2023 2200 by Daija Cortes RN  Safety Promotion/Fall Prevention: safety round/check completed  Taken 11/19/2023 2100 by Daija Cortes RN  Safety Promotion/Fall Prevention: safety round/check completed  Taken 11/19/2023 2000 by Daija Cortes RN  Safety Promotion/Fall Prevention: safety round/check completed  Taken 11/19/2023 1900 by Daija Cortes RN  Safety Promotion/Fall Prevention: safety round/check completed  Intervention: Prevent Skin Injury  Recent Flowsheet Documentation  Taken 11/20/2023 0600 by Daija Cortes RN  Body Position: position changed independently  Taken 11/20/2023 0400 by Daija Cortes RN  Body Position: position changed independently  Taken 11/20/2023 0300 by Sophia  BENJA Choe  Body Position: position changed independently  Taken 11/20/2023 0200 by Daija Cortes RN  Body Position: position changed independently  Taken 11/20/2023 0100 by Daija Cortes RN  Body Position: position changed independently  Taken 11/20/2023 0000 by Daija Cortes RN  Body Position: position changed independently  Taken 11/19/2023 2200 by Daija Cortes RN  Body Position: position changed independently  Taken 11/19/2023 2000 by Daija Cortes RN  Body Position: position changed independently  Intervention: Prevent and Manage VTE (Venous Thromboembolism) Risk  Recent Flowsheet Documentation  Taken 11/20/2023 0400 by Daija Cortes RN  Activity Management: activity minimized  VTE Prevention/Management: other (see comments)  Range of Motion: ROM (range of motion) performed  Taken 11/20/2023 0000 by Daija Cortes RN  Activity Management: activity minimized  VTE Prevention/Management: other (see comments)  Range of Motion: ROM (range of motion) performed  Taken 11/19/2023 2000 by Daija Cortes RN  Activity Management: up to bedside commode  VTE Prevention/Management: (heparin gtt) other (see comments)  Range of Motion: ROM (range of motion) performed  Intervention: Prevent Infection  Recent Flowsheet Documentation  Taken 11/20/2023 0400 by Daija Cortes RN  Infection Prevention: single patient room provided  Taken 11/20/2023 0000 by Daija Cortes RN  Infection Prevention: single patient room provided  Taken 11/19/2023 2000 by Daija Cortes RN  Infection Prevention: single patient room provided  Goal: Optimal Comfort and Wellbeing  Outcome: Ongoing, Progressing  Intervention: Monitor Pain and Promote Comfort  Recent Flowsheet Documentation  Taken 11/20/2023 0200 by Daija Cortes RN  Pain Management Interventions: see MAR  Goal: Readiness for Transition of Care  Outcome: Ongoing, Progressing     Problem: Pain Acute  Goal: Acceptable Pain Control and Functional  Ability  Outcome: Ongoing, Progressing  Intervention: Prevent or Manage Pain  Recent Flowsheet Documentation  Taken 11/20/2023 0400 by Daija Cortes RN  Medication Review/Management: medications reviewed  Taken 11/20/2023 0000 by Daija Cortes RN  Medication Review/Management: medications reviewed  Taken 11/19/2023 2000 by Daija Cortes RN  Medication Review/Management: medications reviewed  Intervention: Develop Pain Management Plan  Recent Flowsheet Documentation  Taken 11/20/2023 0200 by Daija Cortes RN  Pain Management Interventions: see MAR  Intervention: Optimize Psychosocial Wellbeing  Recent Flowsheet Documentation  Taken 11/20/2023 0600 by Daija Cortes RN  Diversional Activities: television  Taken 11/20/2023 0500 by Daija Cortes RN  Diversional Activities: television  Taken 11/20/2023 0400 by Daija Cortes RN  Diversional Activities: television  Taken 11/20/2023 0300 by Daija Cortes RN  Diversional Activities: television  Taken 11/20/2023 0200 by Daija Cortes RN  Diversional Activities: television  Taken 11/20/2023 0100 by Daija Cortes RN  Diversional Activities: television  Taken 11/20/2023 0000 by Daija Cortes RN  Diversional Activities: television  Taken 11/19/2023 2300 by Daija Cortes RN  Diversional Activities: television  Taken 11/19/2023 2200 by Daija Cortes RN  Diversional Activities: television  Taken 11/19/2023 2100 by Daija Cortes RN  Diversional Activities: television  Taken 11/19/2023 2000 by Daija Cortes RN  Diversional Activities: television     Problem: Adjustment to Illness (Chronic Kidney Disease)  Goal: Optimal Coping with Chronic Illness  Outcome: Ongoing, Progressing     Problem: Electrolyte Imbalance (Chronic Kidney Disease)  Goal: Electrolyte Balance  Outcome: Ongoing, Progressing     Problem: Fluid Volume Excess (Chronic Kidney Disease)  Goal: Fluid Balance  Outcome: Ongoing, Progressing     Problem: Functional Decline (Chronic  Kidney Disease)  Goal: Optimal Functional Ability  Outcome: Ongoing, Progressing  Intervention: Optimize Functional Ability  Recent Flowsheet Documentation  Taken 11/20/2023 0400 by Daija Cortes RN  Activity Management: activity minimized  Taken 11/20/2023 0000 by Daija Cortes RN  Activity Management: activity minimized  Taken 11/19/2023 2000 by Daija Cortes RN  Activity Management: up to bedside commode     Problem: Hematologic Alteration (Chronic Kidney Disease)  Goal: Absence of Anemia Signs and Symptoms  Outcome: Ongoing, Progressing     Problem: Oral Intake Inadequate (Chronic Kidney Disease)  Goal: Optimal Oral Intake  Outcome: Ongoing, Progressing     Problem: Pain (Chronic Kidney Disease)  Goal: Acceptable Pain Control  Outcome: Ongoing, Progressing  Intervention: Prevent or Manage Pain  Recent Flowsheet Documentation  Taken 11/20/2023 0200 by Daija Cortes RN  Pain Management Interventions: see MAR     Problem: Renal Function Impairment (Chronic Kidney Disease)  Goal: Minimize Renal Failure Effects  Outcome: Ongoing, Progressing  Intervention: Protect and Monitor Dialysis Access Site  Recent Flowsheet Documentation  Taken 11/20/2023 0400 by Daija Cortes RN  Safety Precautions: soft boundaries provided  Taken 11/20/2023 0000 by Daija Cortes RN  Safety Precautions: soft boundaries provided  Taken 11/19/2023 2000 by Daija Cortes RN  Safety Precautions: soft boundaries provided  Intervention: Monitor and Support Renal Function  Recent Flowsheet Documentation  Taken 11/20/2023 0400 by Daija Cortes RN  Medication Review/Management: medications reviewed  Taken 11/20/2023 0000 by Daija Cortes RN  Medication Review/Management: medications reviewed  Taken 11/19/2023 2000 by Daija Cortes RN  Medication Review/Management: medications reviewed     Problem: Fall Injury Risk  Goal: Absence of Fall and Fall-Related Injury  Outcome: Ongoing, Progressing  Intervention: Identify and  Manage Contributors  Recent Flowsheet Documentation  Taken 11/20/2023 0400 by Daija Cortes RN  Medication Review/Management: medications reviewed  Taken 11/20/2023 0000 by Daija Cortes RN  Medication Review/Management: medications reviewed  Taken 11/19/2023 2000 by Daija Cortes RN  Medication Review/Management: medications reviewed  Intervention: Promote Injury-Free Environment  Recent Flowsheet Documentation  Taken 11/20/2023 0600 by Daija Cortes RN  Safety Promotion/Fall Prevention: safety round/check completed  Taken 11/20/2023 0500 by Daija Cortes RN  Safety Promotion/Fall Prevention: safety round/check completed  Taken 11/20/2023 0400 by Daija Cortes RN  Safety Promotion/Fall Prevention: safety round/check completed  Taken 11/20/2023 0300 by Daija Cortes RN  Safety Promotion/Fall Prevention: safety round/check completed  Taken 11/20/2023 0200 by Daija Cortes RN  Safety Promotion/Fall Prevention: safety round/check completed  Taken 11/20/2023 0100 by Daija Cortes RN  Safety Promotion/Fall Prevention: safety round/check completed  Taken 11/20/2023 0000 by Daija Cortes RN  Safety Promotion/Fall Prevention: safety round/check completed  Taken 11/19/2023 2300 by Daija Cortes RN  Safety Promotion/Fall Prevention: safety round/check completed  Taken 11/19/2023 2200 by Daija Cortes RN  Safety Promotion/Fall Prevention: safety round/check completed  Taken 11/19/2023 2100 by Daija Cortes RN  Safety Promotion/Fall Prevention: safety round/check completed  Taken 11/19/2023 2000 by Daija Cortes RN  Safety Promotion/Fall Prevention: safety round/check completed  Taken 11/19/2023 1900 by Daija Cortes RN  Safety Promotion/Fall Prevention: safety round/check completed     Problem: Restraint, Nonviolent  Goal: Absence of Harm or Injury  Outcome: Ongoing, Progressing  Intervention: Implement Least Restrictive Safety Strategies  Recent Flowsheet Documentation  Taken  11/20/2023 0600 by Daija Cortes RN  Medical Device Protection: tubing secured  Less Restrictive Alternative:   sensory stimulation limited   bed alarm in use  De-Escalation Techniques: stimulation decreased  Diversional Activities: television  Taken 11/20/2023 0500 by Daija Cortes RN  Medical Device Protection: tubing secured  Diversional Activities: television  Taken 11/20/2023 0400 by Daija Cortes RN  Medical Device Protection:   torso covered   tubing secured  Less Restrictive Alternative:   sensory stimulation limited   bed alarm in use  De-Escalation Techniques: stimulation decreased  Diversional Activities: television  Taken 11/20/2023 0300 by Daija Cortes RN  Medical Device Protection: tubing secured  Diversional Activities: television  Taken 11/20/2023 0200 by Daija Cortes RN  Medical Device Protection: tubing secured  Less Restrictive Alternative:   sensory stimulation limited   bed alarm in use  De-Escalation Techniques: stimulation decreased  Diversional Activities: television  Taken 11/20/2023 0100 by Daija Cortes RN  Medical Device Protection: tubing secured  Diversional Activities: television  Taken 11/20/2023 0000 by Daija Cortes RN  Medical Device Protection:   torso covered   tubing secured  Less Restrictive Alternative:   sensory stimulation limited   bed alarm in use  De-Escalation Techniques: stimulation decreased  Diversional Activities: television  Taken 11/19/2023 2300 by Daija Cortes RN  Medical Device Protection: tubing secured  Diversional Activities: television  Taken 11/19/2023 2200 by Daija Cortes RN  Medical Device Protection: tubing secured  Less Restrictive Alternative:   sensory stimulation limited   bed alarm in use  De-Escalation Techniques: stimulation decreased  Diversional Activities: television  Taken 11/19/2023 2100 by Daija Cortes RN  Medical Device Protection: tubing secured  Diversional Activities: television  Taken 11/19/2023 2000 by  Daija Cortes RN  Medical Device Protection:   torso covered   tubing secured  Less Restrictive Alternative:   sensory stimulation limited   bed alarm in use  De-Escalation Techniques: stimulation decreased  Diversional Activities: television  Intervention: Protect Skin and Joint Integrity  Recent Flowsheet Documentation  Taken 11/20/2023 0600 by Daija Cortes RN  Body Position: position changed independently  Taken 11/20/2023 0400 by Daija Cortes RN  Body Position: position changed independently  Range of Motion: ROM (range of motion) performed  Taken 11/20/2023 0300 by Daija Cortes RN  Body Position: position changed independently  Taken 11/20/2023 0200 by Daija Cortes RN  Body Position: position changed independently  Taken 11/20/2023 0100 by Daija Cortes RN  Body Position: position changed independently  Taken 11/20/2023 0000 by Daija Cortes RN  Body Position: position changed independently  Range of Motion: ROM (range of motion) performed  Taken 11/19/2023 2200 by Daija Cortes RN  Body Position: position changed independently  Taken 11/19/2023 2000 by Daija Cortes RN  Body Position: position changed independently  Range of Motion: ROM (range of motion) performed     Problem: Skin Injury Risk Increased  Goal: Skin Health and Integrity  Outcome: Ongoing, Progressing   Goal Outcome Evaluation:

## 2023-11-20 NOTE — PROGRESS NOTES
LOS: 5 days   Patient Care Team:  Provider, Yenifer Known as PCP - General    Chief Complaint: Left leg pain    Subjective     The patient was seen/examined at the bedside.  No issues overnight.  Patient resting on dialysis.  Patient denies any current pain to her left foot.  She has not complained over the entire weekend.  She has been confused and has been pulling out her IVs.  She also has been complaining of abdominal pain today.    Objective     Vital Signs  Temp:  [97.4 °F (36.3 °C)-98.5 °F (36.9 °C)] 97.4 °F (36.3 °C)  Heart Rate:  [] 88  BP: (119-194)/() 168/65    Physical Exam  Vitals and nursing note reviewed.   Constitutional:       Appearance: She is well-developed.   HENT:      Head: Normocephalic and atraumatic.   Eyes:      General: No scleral icterus.     Pupils: Pupils are equal, round, and reactive to light.   Neck:      Thyroid: No thyromegaly.      Vascular: No carotid bruit or JVD.   Cardiovascular:      Rate and Rhythm: Normal rate and regular rhythm.      Pulses:           Carotid pulses are 2+ on the right side and 2+ on the left side.       Femoral pulses are 2+ on the right side and 2+ on the left side.       Popliteal pulses are 0 on the right side and 0 on the left side.        Dorsalis pedis pulses are detected w/ Doppler on the right side and detected w/ Doppler on the left side.        Posterior tibial pulses are detected w/ Doppler on the right side and detected w/ Doppler on the left side.      Heart sounds: Normal heart sounds.   Pulmonary:      Effort: Pulmonary effort is normal.      Breath sounds: Normal breath sounds.   Abdominal:      General: Bowel sounds are normal. There is no distension or abdominal bruit.      Palpations: Abdomen is soft. There is no mass.      Tenderness: There is no abdominal tenderness.   Musculoskeletal:         General: Normal range of motion.      Cervical back: Neck supple.   Lymphadenopathy:      Cervical: No cervical adenopathy.    Skin:     General: Skin is warm and dry.   Neurological:      Mental Status: She is alert and oriented to person, place, and time.      Cranial Nerves: No cranial nerve deficit.      Sensory: No sensory deficit.         Laboratory Data:   Results from last 7 days   Lab Units 11/19/23  0651 11/18/23  0655 11/17/23  0825   WBC 10*3/mm3 8.11 8.52 7.91   HEMOGLOBIN g/dL 10.3* 10.0* 10.4*   HEMATOCRIT % 33.0* 31.9* 33.5*   PLATELETS 10*3/mm3 206 188 217       Results from last 7 days   Lab Units 11/19/23  0651 11/18/23  0655 11/17/23  0825 11/16/23  0627 11/15/23  1158   SODIUM mmol/L 138 135* 135*   < > 141   POTASSIUM mmol/L 4.1 4.2 3.9   < > 4.2   CHLORIDE mmol/L 98 96* 95*   < > 105   CO2 mmol/L 27.0 26.0 24.0   < > 25.0   BUN mg/dL 42* 27* 26*   < > 49*   CREATININE mg/dL 3.85* 2.99* 2.80*   < > 4.31*   CALCIUM mg/dL 9.6 9.2 7.9*   < > 9.5   BILIRUBIN mg/dL  --   --   --   --  0.3   ALK PHOS U/L  --   --   --   --  130*   ALT (SGPT) U/L  --   --   --   --  7   AST (SGOT) U/L  --   --   --   --  12   GLUCOSE mg/dL 236* 212* 211*   < > 139*    < > = values in this interval not displayed.     Results from last 7 days   Lab Units 11/20/23  0927 11/20/23  0603 11/20/23  0020 11/15/23  1842 11/15/23  1158   INR   --   --   --   --  1.10*   APTT seconds 51.7* 112.4* 55.4*   < > 32.4    < > = values in this interval not displayed.             Assessment & Plan       Ischemic rest pain of lower extremity    End stage renal disease on dialysis    Type 2 diabetes mellitus, with long-term current use of insulin    Primary hypertension    Coronary artery disease involving native coronary artery of native heart without angina pectoris    Chronic GERD      Plan:  1.  We will hold off on angiogram at this point.  She has evidence of chronic disease and will likely require amputation at some point.  With her current confusion I do not believe anesthesia would be in her best interest.  No emergent surgical intervention is needed.   We could hold the heparin drip for now.  This was discussed with nursing.      Kwame Villagomez, DO  Vascular Surgery  [phone]  11/20/23  13:49 CST

## 2023-11-20 NOTE — THERAPY EVALUATION
Patient Name: Harmony Montoya  : 1950    MRN: [id number]                              Today's Date: 2023       Admit Date: 11/15/2023    Visit Dx:     ICD-10-CM ICD-9-CM   1. End stage renal disease on dialysis  N18.6 585.6    Z99.2 V45.11   2. Arterial occlusion, lower extremity  I70.209 444.22   3. Intractable pain  R52 780.96   4. Ischemic rest pain of lower extremity  M79.606 729.5    I99.8 459.9   5. Impaired mobility [Z74.09]  Z74.09 799.89     Patient Active Problem List   Diagnosis    Pleural effusion    End stage renal disease on dialysis    Ischemic rest pain of lower extremity    Type 2 diabetes mellitus, with long-term current use of insulin    Primary hypertension    Coronary artery disease involving native coronary artery of native heart without angina pectoris    Chronic GERD     History reviewed. No pertinent past medical history.  History reviewed. No pertinent surgical history.   General Information       Row Name 23 1300          Physical Therapy Time and Intention    Document Type evaluation  Left leg pain, inability to walk. Dx: ischemic LE, ESRD on MWF dialysis. Hx Chronic B SFA and popliteal A occlusions.  -SILVINA     Mode of Treatment physical therapy  -SILVINA       Row Name 23 1300          General Information    Patient Profile Reviewed yes  -SILVINA     Prior Level of Function --  pt is confused and a poor historian  -SILVINA     Existing Precautions/Restrictions fall  B UE restraints  -SILVINA     Barriers to Rehab medically complex;previous functional deficit;cognitive status;physical barrier  -SILVINA       Row Name 23 1300          Living Environment    People in Home alone  -SILVINA       Row Name 23 1300          Home Main Entrance    Number of Stairs, Main Entrance --  patient unsure  -SILVINA       Row Name 23 1300          Cognition    Orientation Status (Cognition) oriented to;person  confused,  -SILVINA       Row Name 23 1300          Safety Issues, Functional Mobility     Safety Issues Affecting Function (Mobility) ability to follow commands;at risk behavior observed;awareness of need for assistance;friction/shear risk;impulsivity;insight into deficits/self-awareness;judgment;problem-solving;safety precaution awareness;safety precautions follow-through/compliance  -SILVINA     Impairments Affecting Function (Mobility) balance;endurance/activity tolerance;strength;shortness of breath;cognition;postural/trunk control;range of motion (ROM);sensation/sensory awareness  -SILVINA               User Key  (r) = Recorded By, (t) = Taken By, (c) = Cosigned By      Initials Name Provider Type    Bashir Sher, PT DPT Physical Therapist                   Mobility       Row Name 11/20/23 1300          Bed Mobility    Bed Mobility supine-sit;scooting/bridging;sit-supine  -SILVINA     Scooting/Bridging Latimer (Bed Mobility) dependent (less than 25% patient effort)  -SILVINA     Supine-Sit Latimer (Bed Mobility) maximum assist (25% patient effort)  -SILVINA     Sit-Supine Latimer (Bed Mobility) maximum assist (25% patient effort)  -SILVINA     Assistive Device (Bed Mobility) head of bed elevated;draw sheet;bed rails  -SILVINA       Row Name 11/20/23 1300          Sit-Stand Transfer    Sit-Stand Latimer (Transfers) moderate assist (50% patient effort)  -SILVINA     Comment, (Sit-Stand Transfer) placed R foot on ground to push to stand. Did not place L LE on ground even once in standing.   -SILVINA               User Key  (r) = Recorded By, (t) = Taken By, (c) = Cosigned By      Initials Name Provider Type    Bashir Sher, PT DPT Physical Therapist                   Obj/Interventions       Row Name 11/20/23 1300          Range of Motion Comprehensive    Comment, General Range of Motion R hip/knee AROM WFL, L hip/knee AROM impaired 25%. R ankle AROM impaired 50-75%, L ankle AROM impaired 100%.  -SILVINA       Row Name 11/20/23 1300          Strength Comprehensive (MMT)    Comment, General Manual Muscle Testing (MMT)  Assessment R hip/knee grossly 3+/5 with standing, L hip/knee grossly 3-/5, L ankle 0/5, R ankle 2-/5  -SILVINA       Row Name 11/20/23 1300          Balance    Balance Assessment sitting static balance;standing static balance  -SILVINA     Static Sitting Balance moderate assist;maximum assist  -SILVINA     Position, Sitting Balance supported;sitting edge of bed  -SILVINA     Static Standing Balance moderate assist;maximum assist  -SILVINA     Position/Device Used, Standing Balance supported  -SILVINA       Row Name 11/20/23 1300          Sensory Assessment (Somatosensory)    Sensory Assessment (Somatosensory) other (see comments)  difficult to assess, patient reports she can detect touch in B LE  -SILVINA               User Key  (r) = Recorded By, (t) = Taken By, (c) = Cosigned By      Initials Name Provider Type    Bashir Sher, PT DPT Physical Therapist                   Goals/Plan       Row Name 11/20/23 1300          Bed Mobility Goal 1 (PT)    Activity/Assistive Device (Bed Mobility Goal 1, PT) sit to supine/supine to sit  -SILVINA     [place]our Level/Cues Needed (Bed Mobility Goal 1, PT) minimum assist (75% or more patient effort)  -SILVINA     Time Frame (Bed Mobility Goal 1, PT) long term goal (LTG);10 days  -SILVINA     Progress/Outcomes (Bed Mobility Goal 1, PT) new goal  -SILVINA       Row Name 11/20/23 1300          Transfer Goal 1 (PT)    Activity/Assistive Device (Transfer Goal 1, PT) sit-to-stand/stand-to-sit;bed-to-chair/chair-to-bed;walker, rolling  -SILVINA     [place]our Level/Cues Needed (Transfer Goal 1, PT) minimum assist (75% or more patient effort)  -SILVINA     Time Frame (Transfer Goal 1, PT) long term goal (LTG);10 days  -SILVINA     Progress/Outcome (Transfer Goal 1, PT) new goal  -SILVINA       Row Name 11/20/23 1300          Therapy Assessment/Plan (PT)    Planned Therapy Interventions (PT) bed mobility training;transfer training;gait training;balance training;home exercise program;patient/family education;postural re-education;strengthening;ROM  (range of motion);stretching  -SILVINA               User Key  (r) = Recorded By, (t) = Taken By, (c) = Cosigned By      Initials Name Provider Type    Bashir Sher, PT DPT Physical Therapist                   Clinical Impression       Row Name 23 1300          Pain    Additional Documentation Pain Scale: FACES Pre/Post-Treatment (Group)  -SILVINA       Adrien Name 23 1300          Pain Scale: FACES Pre/Post-Treatment    Pain: FACES Scale, Pretreatment 0-->no hurt  -SILVINA       Row Name 23 1300          Plan of Care Review    Plan of Care Reviewed With patient  -SILVINA     Outcome Evaluation PT reny complete. She is alert but confused, only oriented to name and needs cues to her . She often asks questions about her mother throughout the session. She is a poor historian but reports she lives alone. She demos significant weakness in B LE, L LE worse with impaired AROM and strength. She demos 0/5 strength in her L ankle. L hip/knee grossly 3-/5. She needs max assist for all supine <> sit bed mobillity. She was able to stand with mod assist. She placed her R foot on ground to push to stand. Did not place L LE on ground even once in standing. Needs mod/max assist to maintain static standing balance. PT will cont with mobility and strengthening. Recommend d.c to SNF for rehab efforts.  -SILVINA       Row Name 23 1300          Therapy Assessment/Plan (PT)    Patient/Family Therapy Goals Statement (PT) pt did not state  -SILVINA     Rehab Potential (PT) fair, will monitor progress closely  -SILVINA     Criteria for Skilled Interventions Met (PT) yes;meets criteria;skilled treatment is necessary  -SILVINA     Therapy Frequency (PT) 2 times/day  -SILVINA     Predicted Duration of Therapy Intervention (PT) until d.c  -SILVINA       Row Name 23 1300          Positioning and Restraints    Pre-Treatment Position in bed  -SILVINA     Post Treatment Position bed  -SILVINA     In Bed notified nsg;gamal;call light within reach;encouraged to call for  assist;exit alarm on;patient within staff view  -SILVINA     Restraints reapplied:;soft limb  B UE  -SILVINA               User Key  (r) = Recorded By, (t) = Taken By, (c) = Cosigned By      Initials Name Provider Type    Bashir Sher, PT DPT Physical Therapist                   Outcome Measures       Row Name 11/20/23 1300 11/20/23 0813       How much help from another person do you currently need...    Turning from your back to your side while in flat bed without using bedrails? 2  -SILVINA 4  -SE    Moving from lying on back to sitting on the side of a flat bed without bedrails? 2  -SILVINA 3  -SE    Moving to and from a bed to a chair (including a wheelchair)? 1  -SILVINA 2  -SE    Standing up from a chair using your arms (e.g., wheelchair, bedside chair)? 2  -SILVINA 2  -SE    Climbing 3-5 steps with a railing? 1  -SILVINA 1  -SE    To walk in hospital room? 1  -SILVINA 2  -SE    AM-PAC 6 Clicks Score (PT) 9  -SILVINA 14  -SE    Highest Level of Mobility Goal 3 --> Sit at edge of bed  -SILVINA 4 --> Transfer to chair/commode  -SE      Row Name 11/20/23 1300          Functional Assessment    Outcome Measure Options AM-PAC 6 Clicks Basic Mobility (PT)  -SILVINA               User Key  (r) = Recorded By, (t) = Taken By, (c) = Cosigned By      Initials Name Provider Type    Bashir Sher, PT DPT Physical Therapist    Arnol Alvarenga RN Registered Nurse                                 Physical Therapy Education       Title: PT OT SLP Therapies (In Progress)       Topic: Physical Therapy (In Progress)       Point: Mobility training (In Progress)       Learning Progress Summary             Patient Acceptance, E, NR by SILVINA at 11/20/2023 1300    Comment: benefits of activity, progression of PT                         Point: Home exercise program (Not Started)       Learner Progress:  Not documented in this visit.              Point: Body mechanics (Not Started)       Learner Progress:  Not documented in this visit.              Point: Precautions (Not  Started)       Learner Progress:  Not documented in this visit.                              User Key       Initials Effective Dates Name Provider Type Discipline    SILVINA 23 -  Bashir Burns, PT DPT Physical Therapist PT                  PT Recommendation and Plan  Planned Therapy Interventions (PT): bed mobility training, transfer training, gait training, balance training, home exercise program, patient/family education, postural re-education, strengthening, ROM (range of motion), stretching  Plan of Care Reviewed With: patient  Outcome Evaluation: PT eval complete. She is alert but confused, only oriented to name and needs cues to her . She often asks questions about her mother throughout the session. She is a poor historian but reports she lives alone. She demos significant weakness in B LE, L LE worse with impaired AROM and strength. She demos 0/5 strength in her L ankle. L hip/knee grossly 3-/5. She needs max assist for all supine <> sit bed mobillity. She was able to stand with mod assist. She placed her R foot on ground to push to stand. Did not place L LE on ground even once in standing. Needs mod/max assist to maintain static standing balance. PT will cont with mobility and strengthening. Recommend d.c to SNF for rehab efforts.     Time Calculation:         PT Charges       Row Name 23 1448             Time Calculation    Start Time 1300  -SILVINA      Stop Time 1338  -SILVINA      Time Calculation (min) 38 min  -SILVINA      PT Received On 23  -SILVINA      PT Goal Re-Cert Due Date 23  -SILVINA                User Key  (r) = Recorded By, (t) = Taken By, (c) = Cosigned By      Initials Name Provider Type    Bashir Sher PT DPT Physical Therapist                  Therapy Charges for Today       Code Description Service Date Service Provider Modifiers Qty    [id number] HC PT SUKUMARAL MOD COMPLEXITY 3 2023 Bashir Burns, PT DPT GP 1            PT G-Codes  Outcome Measure Options: AM-PAC 6  Clicks Basic Mobility (PT)  AM-PAC 6 Clicks Score (PT): 9  PT Discharge Summary  Anticipated Discharge Disposition (PT): skilled nursing facility    Bashir Burns, PT DPT  11/20/2023

## 2023-11-20 NOTE — CONSULTS
LOS: 5 days   Patient Care Team:  Provider, No Known as PCP - General    Chief Complaint: Transient chest pain     Subjective    Harmony Montoya is a 73 y.o. female who is being seen in evaluation  Patient admitted with lower extremity ischemic pain  Plans for lower extremity arteriogram and intervention as required  Has evidence of left lower extremity toe necrosis  Had transient substernal chest pain last night  Received 2 sublingual nitroglycerin  This subsequently resolved  Blood pressure was markedly elevated  Currently feels well  Troponin was drawn and elevated at 44  Repeat troponin is pending  Currently feels well and resting comfortably  Blood pressures still elevated but improved  No other new issues or events  No bleeding issues  No falls  Tolerating current medications well  EKG as below    Telemetry: no malignant arrhythmia. No significant pauses.    Review of Systems   Constitutional: No chills   Has fatigue   No fever.   HENT: Negative.    Eyes: Negative.    Respiratory: Negative for cough,   No chest wall soreness,   Shortness of breath,   no wheezing, no stridor.    Cardiovascular: As above  Gastrointestinal: Negative for abdominal distention,  No abdominal pain,   No blood in stool,   No constipation,   No diarrhea,   No nausea   No vomiting.   Endocrine: Negative.    Genitourinary: Negative for difficulty urinating, dysuria, flank pain and hematuria.   Musculoskeletal: Negative.    Skin: Negative for rash and wound.   Allergic/Immunologic: Negative.    Neurological: Negative for dizziness, syncope, weakness,   No light-headedness  No  headaches.   Hematological: Does not bruise/bleed easily.   Psychiatric/Behavioral: Negative for agitation or behavioral problems,   No confusion,   the patient is  nervous/anxious.       History:   History reviewed. No pertinent past medical history.  History reviewed. No pertinent surgical history.  Social History     Socioeconomic History    Marital status:  Single   Tobacco Use    Smoking status: Former     Types: Cigarettes     Quit date: 2021     Years since quittin.5    Smokeless tobacco: Never   Vaping Use    Vaping Use: Never used   Substance and Sexual Activity    Alcohol use: Never    Drug use: Never     History reviewed. No pertinent family history.    Labs:  WBC WBC   Date Value Ref Range Status   2023 8.11 3.40 - 10.80 10*3/mm3 Final   2023 8.52 3.40 - 10.80 10*3/mm3 Final   2023 7.91 3.40 - 10.80 10*3/mm3 Final      HGB Hemoglobin   Date Value Ref Range Status   2023 10.3 (L) 12.0 - 15.9 g/dL Final   2023 10.0 (L) 12.0 - 15.9 g/dL Final   2023 10.4 (L) 12.0 - 15.9 g/dL Final      HCT Hematocrit   Date Value Ref Range Status   2023 33.0 (L) 34.0 - 46.6 % Final   2023 31.9 (L) 34.0 - 46.6 % Final   2023 33.5 (L) 34.0 - 46.6 % Final      Platelets Platelets   Date Value Ref Range Status   2023 206 140 - 450 10*3/mm3 Final   2023 188 140 - 450 10*3/mm3 Final   2023 217 140 - 450 10*3/mm3 Final      MCV MCV   Date Value Ref Range Status   2023 99.7 (H) 79.0 - 97.0 fL Final   2023 99.4 (H) 79.0 - 97.0 fL Final   2023 100.0 (H) 79.0 - 97.0 fL Final        Results from last 7 days   Lab Units 23  0651 23  0655 23  0825 23  0627 11/15/23  1158   SODIUM mmol/L 138 135* 135*   < > 141   POTASSIUM mmol/L 4.1 4.2 3.9   < > 4.2   CHLORIDE mmol/L 98 96* 95*   < > 105   CO2 mmol/L 27.0 26.0 24.0   < > 25.0   BUN mg/dL 42* 27* 26*   < > 49*   CREATININE mg/dL 3.85* 2.99* 2.80*   < > 4.31*   CALCIUM mg/dL 9.6 9.2 7.9*   < > 9.5   BILIRUBIN mg/dL  --   --   --   --  0.3   ALK PHOS U/L  --   --   --   --  130*   ALT (SGPT) U/L  --   --   --   --  7   AST (SGOT) U/L  --   --   --   --  12   GLUCOSE mg/dL 236* 212* 211*   < > 139*    < > = values in this interval not displayed.     Lab Results   Component Value Date    TROPONINT 44 (H) 2023     PT/INR:   "No results found for: \"PROTIME\"/No results found for: \"INR\"    Imaging Results (Last 72 Hours)       ** No results found for the last 72 hours. **            Objective     Allergies   Allergen Reactions    Statins Swelling       Medication Review: Performed  Current Facility-Administered Medications   Medication Dose Route Frequency Provider Last Rate Last Admin    acetaminophen (TYLENOL) tablet 650 mg  650 mg Oral Q6H PRN Bicking, Kwame K, DO        aspirin chewable tablet 81 mg  81 mg Oral Daily Bicking, Kwame K, DO   81 mg at 11/19/23 0806    sennosides-docusate (PERICOLACE) 8.6-50 MG per tablet 2 tablet  2 tablet Oral BID Bicking, Kwame K, DO   2 tablet at 11/19/23 2119    And    polyethylene glycol (MIRALAX) packet 17 g  17 g Oral Daily PRN Bicking, Kwame K, DO        And    bisacodyl (DULCOLAX) EC tablet 5 mg  5 mg Oral Daily PRN Bicking, Kwame K, DO        And    bisacodyl (DULCOLAX) suppository 10 mg  10 mg Rectal Daily PRN Bic, Kwame K, DO        ceFAZolin 2000 mg IVPB in 100 mL NS (MBP)  2,000 mg Intravenous Once Bicking, Kwame K, DO        Chlorhexidine Gluconate Cloth 2 % pads 1 application   1 application  Topical Once Salvatore Miller MD        Chlorhexidine Gluconate Cloth 2 % pads 1 application   1 application  Topical Q24H Salvatore Miller MD   1 application  at 11/20/23 0413    dextrose (D50W) (25 g/50 mL) IV injection 25 g  25 g Intravenous Q15 Min PRN Salvatore Miller MD        dextrose (GLUTOSE) oral gel 15 g  15 g Oral Q15 Min PRN Salvatore Miller MD        glucagon (GLUCAGEN) injection 1 mg  1 mg Intramuscular Q15 Min PRN Salvatore Miller MD        heparin (porcine) injection 1,700 Units  30 Units/kg Intravenous PRN Bicking Kwame K, DO        heparin (porcine) injection 3,400 Units  60 Units/kg Intravenous PRN BicPb cramerin K, DO   3,400 Units at 11/20/23 0055    heparin 18921 units/250 mL (100 units/mL) in 0.45 % NaCl infusion  " 12 Units/kg/hr Intravenous Titrated Salvatore Miller MD   Held at 11/20/23 0631    hydrALAZINE (APRESOLINE) injection 10 mg  10 mg Intravenous Q6H PRN Jane Armstrong MD   10 mg at 11/20/23 0033    HYDROcodone-acetaminophen (NORCO) 5-325 MG per tablet 1 tablet  1 tablet Oral Q4H PRN Kwame Villagomez DO   1 tablet at 11/19/23 1227    HYDROmorphone (DILAUDID) injection 0.5 mg  0.5 mg Intravenous Q4H PRN Kwame Villagomez DO   0.5 mg at 11/20/23 0226    Insulin Lispro (humaLOG) injection 2-9 Units  2-9 Units Subcutaneous 4x Daily AC & at Bedtime Salvatore Miller MD   8 Units at 11/20/23 0436    labetalol (NORMODYNE,TRANDATE) injection 10 mg  10 mg Intravenous Q4H PRN Jane Armstrong MD   10 mg at 11/20/23 0049    lisinopril (PRINIVIL,ZESTRIL) tablet 2.5 mg  2.5 mg Oral BID Salvatore Miller MD   2.5 mg at 11/19/23 2119    mupirocin (BACTROBAN) 2 % nasal ointment 1 application   1 application  Each Nare BID Salvatore Miller MD   1 application  at 11/19/23 2119    naloxone (NARCAN) injection 0.4 mg  0.4 mg Intravenous Q5 Min PRN Kwame Villagomez DO        niCARdipine (CARDENE) 25 mg in 250 mL NS infusion kit  5-15 mg/hr Intravenous Titrated Jane Armstrong MD 50 mL/hr at 11/20/23 0238 5 mg/hr at 11/20/23 0238    nitroglycerin (NITROSTAT) SL tablet 0.4 mg  0.4 mg Sublingual Q5 Min PRN Kwame Villagomez DO   0.4 mg at 11/20/23 0437    ondansetron (ZOFRAN) injection 4 mg  4 mg Intravenous Q6H PRN Kwame Villagomez DO   4 mg at 11/20/23 0126    pantoprazole (PROTONIX) EC tablet 40 mg  40 mg Oral Q AM BickingKwame K, DO   40 mg at 11/19/23 0640    sodium chloride 0.9 % flush 10 mL  10 mL Intravenous Q12H BickingKwame K, DO   10 mL at 11/19/23 2119    sodium chloride 0.9 % flush 10 mL  10 mL Intravenous PRN BickingKwame K, DO           Vital Sign Min/Max for last 24 hours  Temp  Min: 97.9 °F (36.6 °C)  Max: 98.8 °F (37.1 °C)   BP  Min: 142/126  Max: 194/81  "  Pulse  Min: 65  Max: 95   No data recorded   SpO2  Min: 93 %  Max: 100 %   No data recorded   Weight  Min: 54.6 kg (120 lb 4.8 oz)  Max: 54.6 kg (120 lb 4.8 oz)     Flowsheet Rows      Flowsheet Row First Filed Value   Admission Height 160 cm (63\") Documented at 11/15/2023 1045   Admission Weight 56.7 kg (125 lb) Documented at 11/15/2023 1045                Physical Exam:    General Appearance: Awake, alert, in no acute distress  Eyes: Pupils equal and reactive    Ears: Appear intact with no abnormalities noted  Nose: Nares normal, no drainage  Neck: supple, trachea midline, no carotid bruit and no JVD  Back: no kyphosis present,    Lungs: respirations regular, respirations even and respirations unlabored  Heart: normal S1, S2, 2/6 systolic murmur left sternal border  No gallops or rubs  no rub and no click  Abdomen: normal bowel sounds, no tenderness   Skin: no bleeding, bruising or rash  Extremities: no cyanosis  Psychiatric/Behavioral: Negative for agitation, behavioral problems, confusion, the patient does  appear to be nervous/anxious.       Results Review:   I reviewed the patient's new clinical results.  I reviewed the patient's new imaging results and agree with the interpretation.  I reviewed the patient's other test results and agree with the interpretation  I personally viewed and interpreted the patient's EKG/Telemetry data    Discussed with patient  Updated patient regarding any new or relevant abnormalities on review of records or any new findings on physical exam.   Mentioned to patient about purpose of visit and desirable health short and long term goals and objectives.     Reviewed available prior notes, consults, prior visits, laboratory findings, radiology and cardiology relevant reports.   Updated chart as applicable.   I have reviewed the patient's medical history in detail and updated the computerized patient record as relevant.          Assessment & Plan       Ischemic rest pain of lower " extremity    End stage renal disease on dialysis    Type 2 diabetes mellitus, with long-term current use of insulin    Primary hypertension    Coronary artery disease involving native coronary artery of native heart without angina pectoris    Chronic GERD      Plan    Acceptable to proceed with intervention for lower extremity ischemia  Serial cardiac enzymes  Needs better blood pressure control  Continue on anticoagulation  Will require myocardial ischemia work-up in future  Care plan discussed with patient and nursing by bedside  Telemetry  Deep vein thrombosis prophylaxis/precautions  Appropriate diet, fluid, sodium, caffeine, stimulants intake   Questions were encouraged, asked and answered to the patient's  understanding and satisfaction.  Compliance to diet and medications       Christian Donovan MD  11/20/23  07:27 CST    EMR Dragon/Transcription was used to dictate part of this note

## 2023-11-20 NOTE — PROGRESS NOTES
Nephrology ([facility]) Progress Note      Patient:  Harmony Montoya  YOB: 1950  Date of Service: 11/20/2023  MRN: [id number]   Acct: [id number]   Primary Care Physician: Provider, No Known  Advance Directive:   Code Status and Medical Interventions:   Ordered at: 11/15/23 0535     Code Status (Patient has no pulse and is not breathing):    CPR (Attempt to Resuscitate)     Medical Interventions (Patient has pulse or is breathing):    Full Support     Admit Date: 11/15/2023       Hospital Day: 5  Referring Provider: No ref. provider found      Patient personally seen and examined.  Complete chart including Consults, Notes, Operative Reports, Labs, Cardiology, and Radiology studies reviewed as able.        Subjective:  Harmony Montoya is a 73 y.o. female for whom we were consulted for evaluation and treatment of end stage renal disease. Maintenance hemodialysis Monday Wednesday Friday at [facility]. Missed HD on 11/15. History of type 2 diabetes and hypertension. Presented with pain in left foot. Admitted for vascular evaluation. Underwent make-up dialysis on 11/16. Tolerated treatment well but later developed some confusion and was moved to CCU. Tolerated HD on 11/18.     Today is awake, has remained intermittently confused.  Seen during dialysis and tolerating well. Planning for angiogram today.  Dialysis   Patient was seen and evaluated on renal replacement therapy and I have personally evaluated the patient and directed the therapy  Modality: Hemodialysis  Access: Arterial Venous Fistula  Location: right upper  QB: 400  QD: 600  UF: 1200    Allergies:  Statins    Home Meds:  Medications Prior to Admission   Medication Sig Dispense Refill Last Dose    Ascorbic Acid (Vitamin C) 500 MG capsule Take 2 capsules by mouth Daily.   11/15/2023    aspirin 81 MG chewable tablet Chew 1 tablet Daily.   11/14/2023    carvedilol (COREG) 25 MG tablet Take 1 tablet by mouth 2 (Two)  Times a Day With Meals.   11/15/2023    clopidogrel (PLAVIX) 75 MG tablet Take 1 tablet by mouth Daily.   11/14/2023    diphenhydrAMINE (BENADRYL) 25 mg capsule Take 1 capsule by mouth Daily.   11/14/2023    insulin glargine (LANTUS, SEMGLEE) 100 UNIT/ML injection Inject 7 Units under the skin into the appropriate area as directed Daily.   11/15/2023    lisinopril (PRINIVIL,ZESTRIL) 20 MG tablet Take 0.5 tablets by mouth Daily.   11/15/2023    omeprazole (priLOSEC) 40 MG capsule Take 1 capsule by mouth Daily.   11/15/2023    vitamin D3 125 MCG (5000 UT) capsule capsule Take 1 capsule by mouth Daily.   11/15/2023    insulin regular (humuLIN R,novoLIN R) 100 UNIT/ML injection Inject 6 Units under the skin into the appropriate area as directed Daily Before Supper.       Polyvinyl Alcohol-Povidone PF (HYPOTEARS) 1.4-0.6 % ophthalmic solution Administer 1 drop to both eyes 4 (Four) Times a Day.          Medicines:  Current Facility-Administered Medications   Medication Dose Route Frequency Provider Last Rate Last Admin    acetaminophen (TYLENOL) tablet 650 mg  650 mg Oral Q6H PRN Kwame Villagomez DO        amLODIPine (NORVASC) tablet 5 mg  5 mg Oral Q24H Christian Donovan MD        aspirin chewable tablet 81 mg  81 mg Oral Daily Kwame Villagomez DO   81 mg at 11/19/23 0806    sennosides-docusate (PERICOLACE) 8.6-50 MG per tablet 2 tablet  2 tablet Oral BID Kwame Villagomez DO   2 tablet at 11/19/23 2119    And    polyethylene glycol (MIRALAX) packet 17 g  17 g Oral Daily PRN Kwame Villagomez DO        And    bisacodyl (DULCOLAX) EC tablet 5 mg  5 mg Oral Daily PRN BickingKwame DO        And    bisacodyl (DULCOLAX) suppository 10 mg  10 mg Rectal Daily PRN Kwame Villagomez DO        ceFAZolin 2000 mg IVPB in 100 mL NS (MBP)  2,000 mg Intravenous Once Kwame Villagomez DO        Chlorhexidine Gluconate Cloth 2 % pads 1 application   1 application  Topical Once Salvatore Miller MD         Chlorhexidine Gluconate Cloth 2 % pads 1 application   1 application  Topical Q24H Salvatore Miller MD   1 application  at 11/20/23 0413    cloNIDine (CATAPRES) tablet 0.1 mg  0.1 mg Oral Q6H PRN Christian Donovan MD        dextrose (D50W) (25 g/50 mL) IV injection 25 g  25 g Intravenous Q15 Min PRN Salvatore Miller MD        dextrose (GLUTOSE) oral gel 15 g  15 g Oral Q15 Min PRN Salvatore Miller MD        glucagon (GLUCAGEN) injection 1 mg  1 mg Intramuscular Q15 Min PRN Salvatore Miller MD        heparin (porcine) injection 1,700 Units  30 Units/kg Intravenous PRN Kwame Villagomez DO        heparin (porcine) injection 3,400 Units  60 Units/kg Intravenous PRN Kwame Villagomez DO   3,400 Units at 11/20/23 0055    heparin 43580 units/250 mL (100 units/mL) in 0.45 % NaCl infusion  12 Units/kg/hr Intravenous Titrated Salvatore Miller MD 9 mL/hr at 11/20/23 0731 15.873 Units/kg/hr at 11/20/23 0731    hydrALAZINE (APRESOLINE) injection 10 mg  10 mg Intravenous Q6H PRN Jane Armstrong MD   10 mg at 11/20/23 0033    HYDROcodone-acetaminophen (NORCO) 5-325 MG per tablet 1 tablet  1 tablet Oral Q4H PRN Kwame Villagomez DO   1 tablet at 11/19/23 1227    HYDROmorphone (DILAUDID) injection 0.5 mg  0.5 mg Intravenous Q4H PRN Kwame Villagomez DO   0.5 mg at 11/20/23 0226    Insulin Lispro (humaLOG) injection 2-9 Units  2-9 Units Subcutaneous 4x Daily AC & at Bedtime Salvatore Miller MD   8 Units at 11/20/23 0729    isosorbide mononitrate (IMDUR) 24 hr tablet 30 mg  30 mg Oral Q24H Christian Donovan MD        labetalol (NORMODYNE,TRANDATE) injection 10 mg  10 mg Intravenous Q4H PRN Jane Armstrong MD   10 mg at 11/20/23 0049    lisinopril (PRINIVIL,ZESTRIL) tablet 2.5 mg  2.5 mg Oral BID Salvatore Miller MD   2.5 mg at 11/19/23 5483    mupirocin (BACTROBAN) 2 % nasal ointment 1 application   1 application  Each Nare BID Salvatore Miller MD   1 application   at 23    naloxone (NARCAN) injection 0.4 mg  0.4 mg Intravenous Q5 Min PRN Bicking, Kwame K, DO        niCARdipine (CARDENE) 25 mg in 250 mL NS infusion kit  5-15 mg/hr Intravenous Titrated Jane Armstrong MD 50 mL/hr at 23 0238 5 mg/hr at 23 0238    nitroglycerin (NITROSTAT) SL tablet 0.4 mg  0.4 mg Sublingual Q5 Min PRN Bicking, Kwame K, DO   0.4 mg at 23 0437    ondansetron (ZOFRAN) injection 4 mg  4 mg Intravenous Q6H PRN Bicking, Kwame K, DO   4 mg at 23 0126    pantoprazole (PROTONIX) EC tablet 40 mg  40 mg Oral Q AM Bicking, Kwame K, DO   40 mg at 23 0640    sodium chloride 0.9 % flush 10 mL  10 mL Intravenous Q12H Bicking, Kwame K, DO   10 mL at 23    sodium chloride 0.9 % flush 10 mL  10 mL Intravenous PRN Bicking, Kwame K, DO           Past Medical History:  History reviewed. No pertinent past medical history.    Past Surgical History:  History reviewed. No pertinent surgical history.    Family History  History reviewed. No pertinent family history.    Social History  Social History     Socioeconomic History    Marital status: Single   Tobacco Use    Smoking status: Former     Types: Cigarettes     Quit date: 2021     Years since quittin.5    Smokeless tobacco: Never   Vaping Use    Vaping Use: Never used   Substance and Sexual Activity    Alcohol use: Never    Drug use: Never       Review of Systems:  History obtained from chart review and the patient  General ROS: No fever or chills  Respiratory ROS: No cough, shortness of breath, wheezing  Cardiovascular ROS: No chest pain or palpitations  Gastrointestinal ROS: No abdominal pain or melena  Genito-Urinary ROS: No dysuria or hematuria  Psych ROS: No anxiety and depression  14 point ROS reviewed with the patient and negative except as noted above and in the HPI unless unable to obtain.    Objective:  Patient Vitals for the past 24 hrs:   BP Temp Temp src Pulse SpO2 Weight    11/20/23 0811 -- 97.6 °F (36.4 °C) Oral -- -- --   11/20/23 0700 158/70 -- -- 93 95 % --   11/20/23 0600 (!) 173/137 -- -- 92 98 % 54.6 kg (120 lb 4.8 oz)   11/20/23 0500 176/80 -- -- 94 98 % --   11/20/23 0437 159/73 -- -- 93 -- --   11/20/23 0413 153/66 -- -- 91 -- --   11/20/23 0400 153/66 97.9 °F (36.6 °C) Oral 91 94 % --   11/20/23 0300 160/65 -- -- 88 -- --   11/20/23 0238 (!) 194/81 -- -- 94 -- --   11/20/23 0200 (!) 183/81 -- -- 85 99 % --   11/20/23 0100 167/74 -- -- 86 -- --   11/20/23 0000 176/65 98.2 °F (36.8 °C) -- 71 97 % --   11/19/23 2300 (!) 181/68 -- -- 83 98 % --   11/19/23 2200 143/67 -- -- 79 94 % --   11/19/23 2100 (!) 142/126 -- -- 83 95 % --   11/19/23 2000 (!) 151/121 98.2 °F (36.8 °C) Oral 86 98 % --   11/19/23 1900 168/68 -- -- 83 98 % --   11/19/23 1800 153/68 -- -- 72 98 % --   11/19/23 1700 158/54 -- -- 69 93 % --   11/19/23 1600 175/59 -- -- 70 100 % --   11/19/23 1550 -- 98.5 °F (36.9 °C) Oral -- -- --   11/19/23 1500 170/68 -- -- 71 98 % --   11/19/23 1400 150/76 -- -- 81 -- --   11/19/23 1300 153/55 -- -- 65 -- --   11/19/23 1200 157/67 -- -- 78 -- --   11/19/23 1120 -- 98.8 °F (37.1 °C) Axillary 77 99 % --   11/19/23 1115 158/62 -- -- 77 -- --   11/19/23 1100 168/65 -- -- -- -- --   11/19/23 1030 157/85 -- -- 80 -- --   11/19/23 1015 167/60 -- -- 75 -- --   11/19/23 1000 154/69 -- -- 82 -- --   11/19/23 0945 156/64 -- -- 85 -- --   11/19/23 0930 169/82 -- -- 89 -- --   11/19/23 0915 160/68 -- -- 90 -- --       Intake/Output Summary (Last 24 hours) at 11/20/2023 0905  Last data filed at 11/20/2023 0400  Gross per 24 hour   Intake 601.76 ml   Output 150 ml   Net 451.76 ml     General: awake/alert   Chest:  clear to auscultation bilaterally without respiratory distress  CVS: regular rate and rhythm  Abdominal: soft, nontender, positive bowel sounds  Extremities: no cyanosis or edema  Skin: warm and dry without rash      Labs:  Results from last 7 days   Lab Units 11/19/23  0688  11/18/23  0655 11/17/23  0825   WBC 10*3/mm3 8.11 8.52 7.91   HEMOGLOBIN g/dL 10.3* 10.0* 10.4*   HEMATOCRIT % 33.0* 31.9* 33.5*   PLATELETS 10*3/mm3 206 188 217         Results from last 7 days   Lab Units 11/19/23  0651 11/18/23  0655 11/17/23  0825 11/16/23  0627 11/15/23  1158   SODIUM mmol/L 138 135* 135*   < > 141   POTASSIUM mmol/L 4.1 4.2 3.9   < > 4.2   CHLORIDE mmol/L 98 96* 95*   < > 105   CO2 mmol/L 27.0 26.0 24.0   < > 25.0   BUN mg/dL 42* 27* 26*   < > 49*   CREATININE mg/dL 3.85* 2.99* 2.80*   < > 4.31*   CALCIUM mg/dL 9.6 9.2 7.9*   < > 9.5   EGFR mL/min/1.73 11.8* 16.0* 17.3*   < > 10.3*   BILIRUBIN mg/dL  --   --   --   --  0.3   ALK PHOS U/L  --   --   --   --  130*   ALT (SGPT) U/L  --   --   --   --  7   AST (SGOT) U/L  --   --   --   --  12   GLUCOSE mg/dL 236* 212* 211*   < > 139*    < > = values in this interval not displayed.       Radiology:   Imaging Results (Last 72 Hours)       ** No results found for the last 72 hours. **            Culture:  Blood Culture   Date Value Ref Range Status   11/15/2023 No growth at 24 hours  Preliminary   11/15/2023 No growth at 24 hours  Preliminary         Assessment    End stage renal disease on HD MWF  Type 2 diabetes  Hypertension  Peripheral vascular disease  Anemia of CKD  Acute delirium--improving    Plan:  Angiogram today  Dialysis today   Monitor labs      Reji Valadez, APRN  11/20/2023  09:05 CST

## 2023-11-20 NOTE — PROGRESS NOTES
[facility] Medicine Services  INPATIENT PROGRESS NOTE    Patient Name: Harmony Montoya  Date of Admission: 11/15/2023  Today's Date: 11/20/23  Length of Stay: 5  Primary Care Physician: Provider, No Known    Subjective   Chief Complaint: Left leg pain  HPI   Sleeping during HD. She complained of chest pain last night, troponin where checked and cardiology consulted.     11/18 Patient seen in critical care, she is alert to person and place. Responds appropriately today.   11/17 Patient is confused and pulling IVs after HR today. Has received Ativan with partial effect.     Review of Systems   All pertinent negatives and positives are as above. All other systems have been reviewed and are negative unless otherwise stated.     Objective    Temp:  [97.6 °F (36.4 °C)-98.8 °F (37.1 °C)] 97.6 °F (36.4 °C)  Heart Rate:  [65-95] 95  BP: (136-194)/() 136/65  Physical Exam  Vitals reviewed.   Constitutional:       General: She is not in acute distress.     Appearance: She is well-developed. She is not toxic-appearing.   HENT:      Head: Normocephalic and atraumatic.      Right Ear: External ear normal.      Left Ear: External ear normal.      Mouth/Throat:      Mouth: Mucous membranes are dry.      Pharynx: Oropharynx is clear.   Eyes:      General:         Right eye: No discharge.         Left eye: No discharge.      Extraocular Movements: Extraocular movements intact.      Conjunctiva/sclera: Conjunctivae normal.      Pupils: Pupils are equal, round, and reactive to light.   Neck:      Vascular: No JVD.   Cardiovascular:      Rate and Rhythm: Normal rate and regular rhythm.      Heart sounds: Normal heart sounds.   Pulmonary:      Effort: Pulmonary effort is normal. No respiratory distress.      Breath sounds: No stridor. No wheezing, rhonchi or rales.   Chest:      Chest wall: No tenderness.   Abdominal:      General: Bowel sounds are normal. There is no distension.      Palpations:  "Abdomen is soft.      Tenderness: There is no abdominal tenderness. There is no guarding or rebound.      Hernia: No hernia is present.   Musculoskeletal:         General: No swelling, tenderness or deformity. Normal range of motion.      Cervical back: Normal range of motion and neck supple. No rigidity or tenderness. No muscular tenderness.      Right lower leg: No edema.      Left lower leg: No edema.   Skin:     General: Skin is dry.      Findings: No erythema or rash.      Comments: Distal cyanosis and atrophy legs L>R   Neurological:      General: No focal deficit present.      Mental Status: She is alert. She is disoriented.      Cranial Nerves: No cranial nerve deficit.      Sensory: No sensory deficit.      Motor: No weakness or abnormal muscle tone.      Deep Tendon Reflexes: Reflexes normal.     Results Review:  I have reviewed the labs, radiology results, and diagnostic studies.    Laboratory Data:   Results from last 7 days   Lab Units 11/19/23  0651 11/18/23  0655 11/17/23  0825   WBC 10*3/mm3 8.11 8.52 7.91   HEMOGLOBIN g/dL 10.3* 10.0* 10.4*   HEMATOCRIT % 33.0* 31.9* 33.5*   PLATELETS 10*3/mm3 206 188 217        Results from last 7 days   Lab Units 11/19/23  0651 11/18/23  0655 11/17/23  0825 11/16/23  0627 11/15/23  1158   SODIUM mmol/L 138 135* 135*   < > 141   POTASSIUM mmol/L 4.1 4.2 3.9   < > 4.2   CHLORIDE mmol/L 98 96* 95*   < > 105   CO2 mmol/L 27.0 26.0 24.0   < > 25.0   BUN mg/dL 42* 27* 26*   < > 49*   CREATININE mg/dL 3.85* 2.99* 2.80*   < > 4.31*   CALCIUM mg/dL 9.6 9.2 7.9*   < > 9.5   BILIRUBIN mg/dL  --   --   --   --  0.3   ALK PHOS U/L  --   --   --   --  130*   ALT (SGPT) U/L  --   --   --   --  7   AST (SGOT) U/L  --   --   --   --  12   GLUCOSE mg/dL 236* 212* 211*   < > 139*    < > = values in this interval not displayed.       Culture Data:   No results found for: \"BLOODCX\", \"URINECX\", \"WOUNDCX\", \"MRSACX\", \"RESPCX\", \"STOOLCX\"    Radiology Data:   Imaging Results (Last 24 " Hours)       ** No results found for the last 24 hours. **            I have reviewed the patient's current medications.     Assessment/Plan   Assessment  Active Hospital Problems    Diagnosis     **Ischemic rest pain of lower extremity     End stage renal disease on dialysis     Type 2 diabetes mellitus, with long-term current use of insulin     Primary hypertension     Coronary artery disease involving native coronary artery of native heart without angina pectoris     Chronic GERD    Acute delirium    Treatment Plan  Patient delirious this afternoon, at risk of falls and bleeding. Will place in critical care for safety.   Ativan IV prn agitation  Vitals per protocol  Diet cardiac ADA  Up with assistance, fall precautions  Pain control > Tylenol 650 > Lortab 5 > Dilaudid 0.5 IV q4h prn  Heparin drip protocol D for renal failure  Vascular surgery consult > plan for angiogram of LLE yesterday, but patient refused.     CP, elevated troponin  Cardiology consulted     ESRD > Nephrology consult in place     DM > Hold Levemir  Check A1C, lipid panel  Humalog low dose sliding scale     HTN uncontrolled overnight > Cardene drip  Continue home medications Lisinopril     DVT prophylaxis > Patient currently fully anticoagulated    Medical Decision Making  Number and Complexity of problems: 4 complex medical problems  Differential Diagnosis: none     Conditions and Status        Condition is unchanged.     [facility] Data  External documents reviewed: [facility] EHR, care everywhere records  Cardiac tracing (EKG, telemetry) interpretation: EKG pending  Radiology interpretation: See above  Labs reviewed: See above  Any tests that were considered but not ordered: none     Decision rules/scores evaluated (example [id number], Wells, etc): N/A     Discussed with: Patient and nurse Flavio     Care Planning  Shared decision making: Patient  Code status and discussions: Full code     Disposition  Social Determinants of Health that impact treatment  or disposition: none yet  I expect the patient to be discharged to home in 2-4 days.     Electronically signed by Salvatore Miller MD, 11/20/23, 10:04 CST.

## 2023-11-20 NOTE — PLAN OF CARE
Goal Outcome Evaluation:  Plan of Care Reviewed With: patient           Outcome Evaluation: PT eval complete. She is alert but confused, only oriented to name and needs cues to her . She often asks questions about her mother throughout the session. She is a poor historian but reports she lives alone. She demos significant weakness in B LE, L LE worse with impaired AROM and strength. She demos 0/5 strength in her L ankle. L hip/knee grossly 3-/5. She needs max assist for all supine <> sit bed mobillity. She was able to stand with mod assist. She placed her R foot on ground to push to stand. Did not place L LE on ground even once in standing. Needs mod/max assist to maintain static standing balance. PT will cont with mobility and strengthening. Recommend d.c to SNF for rehab efforts.      Anticipated Discharge Disposition (PT): skilled nursing facility

## 2023-11-20 NOTE — PLAN OF CARE
Goal Outcome Evaluation:           Progress: no change                Patient has denied discomfort associated with her LLE. No surgical intervention per Dr. Villagomez. Wound care was consulted regarding necrotic 4th toe. Restraints continued. Accuchecks performed and treated per sliding scale parameters. Heparin drip discontinued. Dialysis pulled 1L off. Cardene drip infusing. No distress noted.

## 2023-11-20 NOTE — CASE MANAGEMENT/SOCIAL WORK
Continued Stay Note   Aníbal     Patient Name: Harmony Montoya  MRN: [id number]  Today's Date: 11/20/2023    Admit Date: 11/15/2023    Plan: SNF?   Discharge Plan       Row Name 11/20/23 1509       Plan    Plan SNF?    Plan Comments Events noted.  Patient now with confusion.  Therapy currently recommending placement.  Angiogram on hold.  SW following.                   Discharge Codes    No documentation.                       SARAH Mcgee

## 2023-11-21 PROBLEM — I67.4 HYPERTENSIVE ENCEPHALOPATHY: Status: ACTIVE | Noted: 2023-11-16

## 2023-11-21 PROBLEM — G93.40 ACUTE ENCEPHALOPATHY: Status: ACTIVE | Noted: 2023-11-16

## 2023-11-21 LAB
BH CV ECHO MEAS - AO MAX PG: 5.3 MMHG
BH CV ECHO MEAS - AO MEAN PG: 3 MMHG
BH CV ECHO MEAS - AO ROOT DIAM: 2.6 CM
BH CV ECHO MEAS - AO V2 MAX: 115 CM/SEC
BH CV ECHO MEAS - AO V2 VTI: 20.3 CM
BH CV ECHO MEAS - AVA(I,D): 1.67 CM2
BH CV ECHO MEAS - EDV(CUBED): 69.4 ML
BH CV ECHO MEAS - EDV(MOD-SP4): 126 ML
BH CV ECHO MEAS - EF(MOD-SP4): 33.2 %
BH CV ECHO MEAS - ESV(CUBED): 24.9 ML
BH CV ECHO MEAS - ESV(MOD-SP4): 84.2 ML
BH CV ECHO MEAS - FS: 29 %
BH CV ECHO MEAS - IVS/LVPW: 1.23 CM
BH CV ECHO MEAS - IVSD: 1.08 CM
BH CV ECHO MEAS - LA DIMENSION: 3.4 CM
BH CV ECHO MEAS - LAT PEAK E' VEL: 7.1 CM/SEC
BH CV ECHO MEAS - LV DIASTOLIC VOL/BSA (35-75): 81 CM2
BH CV ECHO MEAS - LV MASS(C)D: 128.5 GRAMS
BH CV ECHO MEAS - LV MAX PG: 3.6 MMHG
BH CV ECHO MEAS - LV MEAN PG: 2 MMHG
BH CV ECHO MEAS - LV SYSTOLIC VOL/BSA (12-30): 54.1 CM2
BH CV ECHO MEAS - LV V1 MAX: 94.5 CM/SEC
BH CV ECHO MEAS - LV V1 VTI: 16.8 CM
BH CV ECHO MEAS - LVIDD: 4.1 CM
BH CV ECHO MEAS - LVIDS: 2.9 CM
BH CV ECHO MEAS - LVOT AREA: 2.01 CM2
BH CV ECHO MEAS - LVOT DIAM: 1.6 CM
BH CV ECHO MEAS - LVPWD: 0.88 CM
BH CV ECHO MEAS - MED PEAK E' VEL: 3.8 CM/SEC
BH CV ECHO MEAS - MR MAX PG: 102.4 MMHG
BH CV ECHO MEAS - MR MAX VEL: 506 CM/SEC
BH CV ECHO MEAS - MR MEAN PG: 64 MMHG
BH CV ECHO MEAS - MR MEAN VEL: 376 CM/SEC
BH CV ECHO MEAS - MR VTI: 166 CM
BH CV ECHO MEAS - MV A MAX VEL: 97.7 CM/SEC
BH CV ECHO MEAS - MV DEC TIME: 0.14 SEC
BH CV ECHO MEAS - MV E MAX VEL: 52.3 CM/SEC
BH CV ECHO MEAS - MV E/A: 0.54
BH CV ECHO MEAS - SI(MOD-SP4): 26.9 ML/M2
BH CV ECHO MEAS - SV(LVOT): 33.8 ML
BH CV ECHO MEAS - SV(MOD-SP4): 41.8 ML
BH CV ECHO MEAS - TR MAX PG: 27.7 MMHG
BH CV ECHO MEAS - TR MAX VEL: 263 CM/SEC
BH CV ECHO MEASUREMENTS AVERAGE E/E' RATIO: 9.6
GLUCOSE BLDC GLUCOMTR-MCNC: 120 MG/DL (ref 70–130)
GLUCOSE BLDC GLUCOMTR-MCNC: 195 MG/DL (ref 70–130)
GLUCOSE BLDC GLUCOMTR-MCNC: 201 MG/DL (ref 70–130)
GLUCOSE BLDC GLUCOMTR-MCNC: 247 MG/DL (ref 70–130)
LEFT ATRIUM VOLUME INDEX: 33.5 ML/M2
LEFT ATRIUM VOLUME: 52.2 ML

## 2023-11-21 PROCEDURE — 82948 REAGENT STRIP/BLOOD GLUCOSE: CPT

## 2023-11-21 PROCEDURE — 25810000003 SODIUM CHLORIDE 0.9 % SOLUTION: Performed by: HOSPITALIST

## 2023-11-21 PROCEDURE — 97530 THERAPEUTIC ACTIVITIES: CPT

## 2023-11-21 PROCEDURE — 99233 SBSQ HOSP IP/OBS HIGH 50: CPT | Performed by: INTERNAL MEDICINE

## 2023-11-21 PROCEDURE — 63710000001 INSULIN LISPRO (HUMAN) PER 5 UNITS: Performed by: FAMILY MEDICINE

## 2023-11-21 PROCEDURE — 25010000002 HYDROMORPHONE PER 4 MG: Performed by: SURGERY

## 2023-11-21 PROCEDURE — 25010000002 HYDROMORPHONE PER 4 MG: Performed by: FAMILY MEDICINE

## 2023-11-21 RX ADMIN — Medication 1 APPLICATION: at 08:06

## 2023-11-21 RX ADMIN — LISINOPRIL 2.5 MG: 2.5 TABLET ORAL at 21:00

## 2023-11-21 RX ADMIN — SODIUM CHLORIDE 5 MG/HR: 9 INJECTION, SOLUTION INTRAVENOUS at 03:53

## 2023-11-21 RX ADMIN — SODIUM CHLORIDE 5 MG/HR: 9 INJECTION, SOLUTION INTRAVENOUS at 06:01

## 2023-11-21 RX ADMIN — HYDROMORPHONE HYDROCHLORIDE 0.5 MG: 1 INJECTION, SOLUTION INTRAMUSCULAR; INTRAVENOUS; SUBCUTANEOUS at 08:13

## 2023-11-21 RX ADMIN — AMLODIPINE BESYLATE 5 MG: 5 TABLET ORAL at 08:06

## 2023-11-21 RX ADMIN — INSULIN LISPRO 4 UNITS: 100 INJECTION, SOLUTION INTRAVENOUS; SUBCUTANEOUS at 11:41

## 2023-11-21 RX ADMIN — PANTOPRAZOLE SODIUM 40 MG: 40 TABLET, DELAYED RELEASE ORAL at 06:02

## 2023-11-21 RX ADMIN — INSULIN LISPRO 2 UNITS: 100 INJECTION, SOLUTION INTRAVENOUS; SUBCUTANEOUS at 21:13

## 2023-11-21 RX ADMIN — HYDROCODONE BITARTRATE AND ACETAMINOPHEN 1 TABLET: 5; 325 TABLET ORAL at 04:49

## 2023-11-21 RX ADMIN — HYDROCODONE BITARTRATE AND ACETAMINOPHEN 1 TABLET: 5; 325 TABLET ORAL at 11:41

## 2023-11-21 RX ADMIN — Medication 10 ML: at 21:01

## 2023-11-21 RX ADMIN — DOCUSATE SODIUM 50 MG AND SENNOSIDES 8.6 MG 2 TABLET: 8.6; 5 TABLET, FILM COATED ORAL at 08:06

## 2023-11-21 RX ADMIN — CHLORHEXIDINE GLUCONATE 1 APPLICATION: 500 CLOTH TOPICAL at 05:00

## 2023-11-21 RX ADMIN — INSULIN LISPRO 4 UNITS: 100 INJECTION, SOLUTION INTRAVENOUS; SUBCUTANEOUS at 08:08

## 2023-11-21 RX ADMIN — ASPIRIN 81 MG: 81 TABLET, CHEWABLE ORAL at 08:06

## 2023-11-21 RX ADMIN — HYDROCODONE BITARTRATE AND ACETAMINOPHEN 1 TABLET: 5; 325 TABLET ORAL at 18:10

## 2023-11-21 RX ADMIN — HYDROMORPHONE HYDROCHLORIDE 0.5 MG: 1 INJECTION, SOLUTION INTRAMUSCULAR; INTRAVENOUS; SUBCUTANEOUS at 04:01

## 2023-11-21 RX ADMIN — ISOSORBIDE MONONITRATE 30 MG: 30 TABLET, EXTENDED RELEASE ORAL at 08:06

## 2023-11-21 RX ADMIN — LISINOPRIL 2.5 MG: 2.5 TABLET ORAL at 08:06

## 2023-11-21 RX ADMIN — HYDROMORPHONE HYDROCHLORIDE 0.5 MG: 1 INJECTION, SOLUTION INTRAMUSCULAR; INTRAVENOUS; SUBCUTANEOUS at 21:00

## 2023-11-21 NOTE — PLAN OF CARE
Goal Outcome Evaluation:      Patient agreeable to work with therapy. Patient actively works thru LE exercises in supine and sitting EOB. Patient performed supine to sit with Min assist. Good sitting balance. Patient was able to reach lateral and forward while maintaining sitting balance. Patient stood with Min assist. Marched in place for 10 steps with Min assist. Patient performed sit to supine with CGA. Patient will benefit from safety education. Strengthening ans gait training.

## 2023-11-21 NOTE — PROGRESS NOTES
[facility] Medicine Services  INPATIENT PROGRESS NOTE    Patient Name: Harmony Montoya  Date of Admission: 11/15/2023  Today's Date: 11/21/23  Length of Stay: 6  Primary Care Physician: Provider, No Known    Subjective   Chief Complaint: Left leg pain  HPI   She appears oriented today and blood pressure is controlled. Suspect some degree of hypertensive encephalopathy associated to her mental status changes. Off Heparin. Vascular interventions are deferred. Cardiology work up deferred.     11/20 Sleeping during HD. She complained of chest pain last night, troponin where checked and cardiology consulted.   11/19 Requiring cardene for blood pressure.    11/18 Patient seen in critical care, she is alert to person and place. Responds appropriately today.   11/17 Patient is confused and pulling IVs after HR today. Has received Ativan with partial effect.     Review of Systems   All pertinent negatives and positives are as above. All other systems have been reviewed and are negative unless otherwise stated.     Objective    Temp:  [97.4 °F (36.3 °C)-99 °F (37.2 °C)] 98.6 °F (37 °C)  Heart Rate:  [] 85  BP: (114-169)/() 114/54  Physical Exam  Vitals reviewed.   Constitutional:       General: She is not in acute distress.     Appearance: She is well-developed. She is not toxic-appearing.   HENT:      Head: Normocephalic and atraumatic.      Right Ear: External ear normal.      Left Ear: External ear normal.      Mouth/Throat:      Mouth: Mucous membranes are dry.      Pharynx: Oropharynx is clear.   Eyes:      General:         Right eye: No discharge.         Left eye: No discharge.      Extraocular Movements: Extraocular movements intact.      Conjunctiva/sclera: Conjunctivae normal.      Pupils: Pupils are equal, round, and reactive to light.   Neck:      Vascular: No JVD.   Cardiovascular:      Rate and Rhythm: Normal rate and regular rhythm.      Heart sounds: Normal heart  sounds.   Pulmonary:      Effort: Pulmonary effort is normal. No respiratory distress.      Breath sounds: No stridor. No wheezing, rhonchi or rales.   Chest:      Chest wall: No tenderness.   Abdominal:      General: Bowel sounds are normal. There is no distension.      Palpations: Abdomen is soft.      Tenderness: There is no abdominal tenderness. There is no guarding or rebound.      Hernia: No hernia is present.   Musculoskeletal:         General: No swelling, tenderness or deformity. Normal range of motion.      Cervical back: Normal range of motion and neck supple. No rigidity or tenderness. No muscular tenderness.      Right lower leg: No edema.      Left lower leg: No edema.   Skin:     General: Skin is dry.      Findings: No erythema or rash.      Comments: Distal cyanosis and atrophy legs L>R   Neurological:      General: No focal deficit present.      Mental Status: She is alert. She is disoriented.      Cranial Nerves: No cranial nerve deficit.      Sensory: No sensory deficit.      Motor: No weakness or abnormal muscle tone.      Deep Tendon Reflexes: Reflexes normal.     Results Review:  I have reviewed the labs, radiology results, and diagnostic studies.    Laboratory Data:   Results from last 7 days   Lab Units 11/19/23  0651 11/18/23  0655 11/17/23  0825   WBC 10*3/mm3 8.11 8.52 7.91   HEMOGLOBIN g/dL 10.3* 10.0* 10.4*   HEMATOCRIT % 33.0* 31.9* 33.5*   PLATELETS 10*3/mm3 206 188 217        Results from last 7 days   Lab Units 11/19/23  0651 11/18/23  0655 11/17/23  0825 11/16/23  0627 11/15/23  1158   SODIUM mmol/L 138 135* 135*   < > 141   POTASSIUM mmol/L 4.1 4.2 3.9   < > 4.2   CHLORIDE mmol/L 98 96* 95*   < > 105   CO2 mmol/L 27.0 26.0 24.0   < > 25.0   BUN mg/dL 42* 27* 26*   < > 49*   CREATININE mg/dL 3.85* 2.99* 2.80*   < > 4.31*   CALCIUM mg/dL 9.6 9.2 7.9*   < > 9.5   BILIRUBIN mg/dL  --   --   --   --  0.3   ALK PHOS U/L  --   --   --   --  130*   ALT (SGPT) U/L  --   --   --   --  7  "  AST (SGOT) U/L  --   --   --   --  12   GLUCOSE mg/dL 236* 212* 211*   < > 139*    < > = values in this interval not displayed.       Culture Data:   No results found for: \"BLOODCX\", \"URINECX\", \"WOUNDCX\", \"MRSACX\", \"RESPCX\", \"STOOLCX\"    Radiology Data:   Imaging Results (Last 24 Hours)       ** No results found for the last 24 hours. **            I have reviewed the patient's current medications.     Assessment/Plan   Assessment  Active Hospital Problems    Diagnosis     **Ischemic rest pain of lower extremity     Acute encephalopathy     Hypertensive encephalopathy     End stage renal disease on dialysis     Type 2 diabetes mellitus, with long-term current use of insulin     Primary hypertension     Coronary artery disease involving native coronary artery of native heart without angina pectoris     Chronic GERD      Treatment Plan  Delirium and hypertensive encephalopathy > appears resolved  Off Heparin drip. At this time she is stable to transfer back to telemetry.     Ativan IV prn agitation  Vitals per protocol  Diet cardiac ADA  Up with assistance, fall precautions  Pain control > Tylenol 650 > Lortab 5 > Dilaudid 0.5 IV q4h prn  Heparin drip protocol D for renal failure > stopped by vascular.   Vascular surgery consult > plan for angiogram of LLE yesterday, but patient refused.     CP, elevated troponin  Cardiology consulted  Deferred stress testing     ESRD > Nephrology consult in place     DM > Hold Levemir  Check A1C, lipid panel  Humalog low dose sliding scale     HTN>Continue home medications Lisinopril     DVT prophylaxis > Patient currently fully anticoagulated    Medical Decision Making  Number and Complexity of problems: 4 complex medical problems  Differential Diagnosis: none     Conditions and Status        Condition is unchanged.     [facility] Data  External documents reviewed: [employer], care everywhere records  Cardiac tracing (EKG, telemetry) interpretation: EKG pending  Radiology interpretation: " See above  Labs reviewed: See above  Any tests that were considered but not ordered: none     Decision rules/scores evaluated (example [id number], Wells, etc): N/A     Discussed with: Patient and nurse Flavio     Care Planning  Shared decision making: Patient  Code status and discussions: Full code     Disposition  Social Determinants of Health that impact treatment or disposition: none yet  I expect the patient to be discharged to home in 2-4 days.     Electronically signed by Salvatore Miller MD, 11/21/23, 09:52 CST.

## 2023-11-21 NOTE — CONSULTS
Brief wound care provider note: This provider called and spoke with patient's current nurse Carlos in the ICU.  This provider has requested a picture to be taken of the necrotic left fourth toe that apparently is the reason for the wound care consult.  This provider has placed orders for Betadine wet-to-dry twice daily to this toe.  Additionally, patient has been followed by Dr. Villagomez and he is mentioned significant peripheral vascular disease with potential for amputation of left lower extremity in the near future.  The only other input I would have is the possibility of Dr. Degroot with podiatry assessing the patient to see whether or not he had anything additional to offer, but is doubtful. I do not have anything else to offer at this point in time and will sign off. Please contact my office if I can be of further assistance.  HEMANT Parikh, FNP-BC, CWYARON-AP

## 2023-11-21 NOTE — PROGRESS NOTES
Nephrology ([facility]) Progress Note      Patient:  Harmony Montoya  YOB: 1950  Date of Service: 11/21/2023  MRN: [id number]   Acct: [id number]   Primary Care Physician: Provider, No Known  Advance Directive:   Code Status and Medical Interventions:   Ordered at: 11/15/23 0629     Code Status (Patient has no pulse and is not breathing):    CPR (Attempt to Resuscitate)     Medical Interventions (Patient has pulse or is breathing):    Full Support     Admit Date: 11/15/2023       Hospital Day: 6  Referring Provider: No ref. provider found      Patient personally seen and examined.  Complete chart including Consults, Notes, Operative Reports, Labs, Cardiology, and Radiology studies reviewed as able.        Subjective:  Harmony Montoya is a 73 y.o. female for whom we were consulted for evaluation and treatment of end stage renal disease. Maintenance hemodialysis Monday Wednesday Friday at [facility]. Missed HD on 11/15. History of type 2 diabetes and hypertension. Presented with pain in left foot. Admitted for vascular evaluation. Underwent make-up dialysis on 11/16. Tolerated treatment well but later developed some confusion and was moved to CCU. Tolerated HD on 11/18 &11/20. Plans for angiogram have now been put on hold per Dr Villagomez.    Today is awake, oriented X 2.  No new complaint or overnight issues.      Allergies:  Statins    Home Meds:  Medications Prior to Admission   Medication Sig Dispense Refill Last Dose    Ascorbic Acid (Vitamin C) 500 MG capsule Take 2 capsules by mouth Daily.   11/15/2023    aspirin 81 MG chewable tablet Chew 1 tablet Daily.   11/14/2023    carvedilol (COREG) 25 MG tablet Take 1 tablet by mouth 2 (Two) Times a Day With Meals.   11/15/2023    clopidogrel (PLAVIX) 75 MG tablet Take 1 tablet by mouth Daily.   11/14/2023    diphenhydrAMINE (BENADRYL) 25 mg capsule Take 1 capsule by mouth Daily.   11/14/2023    insulin glargine (LANTUS,  SEMGLEE) 100 UNIT/ML injection Inject 7 Units under the skin into the appropriate area as directed Daily.   11/15/2023    lisinopril (PRINIVIL,ZESTRIL) 20 MG tablet Take 0.5 tablets by mouth Daily.   11/15/2023    omeprazole (priLOSEC) 40 MG capsule Take 1 capsule by mouth Daily.   11/15/2023    vitamin D3 125 MCG (5000 UT) capsule capsule Take 1 capsule by mouth Daily.   11/15/2023    insulin regular (humuLIN R,novoLIN R) 100 UNIT/ML injection Inject 6 Units under the skin into the appropriate area as directed Daily Before Supper.       Polyvinyl Alcohol-Povidone PF (HYPOTEARS) 1.4-0.6 % ophthalmic solution Administer 1 drop to both eyes 4 (Four) Times a Day.          Medicines:  Current Facility-Administered Medications   Medication Dose Route Frequency Provider Last Rate Last Admin    acetaminophen (TYLENOL) tablet 650 mg  650 mg Oral Q6H PRN Kwame Villagomez DO        amLODIPine (NORVASC) tablet 5 mg  5 mg Oral Q24H Christian Donovan MD   5 mg at 11/21/23 0806    aspirin chewable tablet 81 mg  81 mg Oral Daily BickingKwame DO   81 mg at 11/21/23 0806    sennosides-docusate (PERICOLACE) 8.6-50 MG per tablet 2 tablet  2 tablet Oral BID Kwame Villagomez DO   2 tablet at 11/21/23 0806    And    polyethylene glycol (MIRALAX) packet 17 g  17 g Oral Daily PRN Kwame Villagomez DO        And    bisacodyl (DULCOLAX) EC tablet 5 mg  5 mg Oral Daily PRN BicKwame cramer DO        And    bisacodyl (DULCOLAX) suppository 10 mg  10 mg Rectal Daily PRN BicKwame cramer DO        ceFAZolin 2000 mg IVPB in 100 mL NS (MBP)  2,000 mg Intravenous Once BicKwame cramer DO        Chlorhexidine Gluconate Cloth 2 % pads 1 application   1 application  Topical Once Salvatore Miller MD        Chlorhexidine Gluconate Cloth 2 % pads 1 application   1 application  Topical Q24H Salvatore Miller MD   1 application  at 11/21/23 0500    cloNIDine (CATAPRES) tablet 0.1 mg  0.1 mg Oral Q6H PRN Christian Donovan MD    0.1 mg at 11/20/23 1546    dextrose (D50W) (25 g/50 mL) IV injection 25 g  25 g Intravenous Q15 Min PRN Salvatore Miller MD        dextrose (GLUTOSE) oral gel 15 g  15 g Oral Q15 Min PRN Salvatore Miller MD        glucagon (GLUCAGEN) injection 1 mg  1 mg Intramuscular Q15 Min PRN Salvatore Miller MD        heparin (porcine) injection 1,700 Units  30 Units/kg Intravenous PRN Kwame Villagomez DO        heparin (porcine) injection 3,400 Units  60 Units/kg Intravenous PRN Kwame Villagomez DO   3,400 Units at 11/20/23 0055    hydrALAZINE (APRESOLINE) injection 10 mg  10 mg Intravenous Q6H PRN Jane Armstrong MD   10 mg at 11/20/23 0033    HYDROcodone-acetaminophen (NORCO) 5-325 MG per tablet 1 tablet  1 tablet Oral Q4H PRN Kwame Villagomez DO   1 tablet at 11/21/23 0449    HYDROmorphone (DILAUDID) injection 0.5 mg  0.5 mg Intravenous Q4H PRN Kwame Villagomez DO   0.5 mg at 11/21/23 0813    Insulin Lispro (humaLOG) injection 2-9 Units  2-9 Units Subcutaneous 4x Daily AC & at Bedtime Salvatore Miller MD   4 Units at 11/21/23 0808    isosorbide mononitrate (IMDUR) 24 hr tablet 30 mg  30 mg Oral Q24H Christian Donovan MD   30 mg at 11/21/23 0806    labetalol (NORMODYNE,TRANDATE) injection 10 mg  10 mg Intravenous Q4H PRN Jane Armstrong MD   10 mg at 11/20/23 0049    lisinopril (PRINIVIL,ZESTRIL) tablet 2.5 mg  2.5 mg Oral BID Salvatore Miller MD   2.5 mg at 11/21/23 0806    mupirocin (BACTROBAN) 2 % nasal ointment 1 application   1 application  Each Nare BID Salvatore Miller MD   1 application  at 11/21/23 0806    naloxone (NARCAN) injection 0.4 mg  0.4 mg Intravenous Q5 Min PRN Kwame Villagomez DO        niCARdipine (CARDENE) 25 mg in 250 mL NS infusion kit  5-15 mg/hr Intravenous Titrated Jane Armstrong MD 50 mL/hr at 11/21/23 0601 5 mg/hr at 11/21/23 0601    nitroglycerin (NITROSTAT) SL tablet 0.4 mg  0.4 mg Sublingual Q5 Min PRN Kwame Villagomez,  DO   0.4 mg at 23 0437    ondansetron (ZOFRAN) injection 4 mg  4 mg Intravenous Q6H PRN Kwame Villagomez DO   4 mg at 23 0126    pantoprazole (PROTONIX) EC tablet 40 mg  40 mg Oral Q AM Kwame Villagomez DO   40 mg at 23 0602    sodium chloride 0.9 % flush 10 mL  10 mL Intravenous Q12H Kwame Villagomez, DO   10 mL at 23 2110    sodium chloride 0.9 % flush 10 mL  10 mL Intravenous PRN Kwame Villagomez, DO           Past Medical History:  History reviewed. No pertinent past medical history.    Past Surgical History:  History reviewed. No pertinent surgical history.    Family History  History reviewed. No pertinent family history.    Social History  Social History     Socioeconomic History    Marital status: Single   Tobacco Use    Smoking status: Former     Types: Cigarettes     Quit date: 2021     Years since quittin.5    Smokeless tobacco: Never   Vaping Use    Vaping Use: Never used   Substance and Sexual Activity    Alcohol use: Never    Drug use: Never       Review of Systems:  History obtained from chart review and the patient  General ROS: No fever or chills  Respiratory ROS: No cough, shortness of breath, wheezing  Cardiovascular ROS: No chest pain or palpitations  Gastrointestinal ROS: No abdominal pain or melena  Genito-Urinary ROS: No dysuria or hematuria  Psych ROS: No anxiety and depression  14 point ROS reviewed with the patient and negative except as noted above and in the HPI unless unable to obtain.    Objective:  Patient Vitals for the past 24 hrs:   BP Temp Temp src Pulse SpO2 Weight   23 0830 117/89 -- -- 84 95 % --   23 0815 136/64 -- -- 90 100 % --   23 0800 146/59 98.6 °F (37 °C) Oral 87 97 % --   23 0745 140/59 -- -- 87 97 % --   23 0730 140/56 -- -- 86 97 % --   23 0715 143/60 -- -- 92 99 % --   23 0700 141/57 -- -- 85 98 % --   23 0601 137/55 -- -- -- -- --   230 -- 99 °F (37.2 °C) Oral -- --  59.4 kg (131 lb)   11/21/23 0200 130/53 -- -- 84 99 % --   11/21/23 0100 126/49 -- -- 82 97 % --   11/21/23 0000 124/51 98.9 °F (37.2 °C) Oral 81 96 % --   11/20/23 1900 -- 99 °F (37.2 °C) Oral -- -- --   11/20/23 1848 -- -- -- -- 100 % --   11/20/23 1745 164/62 -- -- 94 -- --   11/20/23 1730 164/62 -- -- 97 -- --   11/20/23 1715 160/63 -- -- 96 -- --   11/20/23 1700 159/63 98.4 °F (36.9 °C) Axillary 97 100 % --   11/20/23 1655 165/61 -- -- 97 -- --   11/20/23 1645 163/89 -- -- 101 99 % --   11/20/23 1630 165/68 -- -- 95 97 % --   11/20/23 1615 166/69 -- -- 94 97 % --   11/20/23 1600 166/67 -- -- 93 97 % --   11/20/23 1546 163/63 -- -- 96 -- --   11/20/23 1545 163/65 -- -- 94 96 % --   11/20/23 1530 164/66 -- -- 95 96 % --   11/20/23 1515 164/66 -- -- 95 98 % --   11/20/23 1500 163/63 -- -- 92 (!) 88 % --   11/20/23 1445 154/61 -- -- 92 98 % --   11/20/23 1430 (!) 169/136 -- -- 99 94 % --   11/20/23 1415 154/59 -- -- 97 97 % --   11/20/23 1400 155/58 -- -- 106 96 % --   11/20/23 1345 156/62 -- -- 92 96 % --   11/20/23 1315 160/62 -- -- 91 98 % --   11/20/23 1300 159/60 -- -- 92 97 % --   11/20/23 1245 154/61 -- -- 91 96 % --   11/20/23 1230 154/60 -- -- 90 97 % --   11/20/23 1210 168/65 -- -- 88 -- --   11/20/23 1200 163/66 -- -- 89 97 % --   11/20/23 1157 -- 97.4 °F (36.3 °C) Axillary -- -- --   11/20/23 1115 159/65 -- -- 90 99 % --   11/20/23 1100 152/70 -- -- 94 97 % --   11/20/23 1045 151/68 -- -- 95 98 % --   11/20/23 1030 151/67 -- -- 93 97 % --   11/20/23 1015 145/68 -- -- 92 96 % --   11/20/23 1000 137/63 -- -- 98 96 % --   11/20/23 0945 133/68 -- -- 93 98 % --   11/20/23 0930 139/63 -- -- 96 96 % --   11/20/23 0915 119/61 -- -- 101 (!) 88 % --   11/20/23 0900 136/65 -- -- 95 95 % --       Intake/Output Summary (Last 24 hours) at 11/21/2023 0855  Last data filed at 11/21/2023 0400  Gross per 24 hour   Intake 756.1 ml   Output 500 ml   Net 256.1 ml     General: awake/alert   Chest:  clear to auscultation  bilaterally without respiratory distress  CVS: regular rate and rhythm  Abdominal: soft, nontender, positive bowel sounds  Extremities: no cyanosis or edema  Skin: warm and dry without rash      Labs:  Results from last 7 days   Lab Units 11/19/23  0651 11/18/23  0655 11/17/23  0825   WBC 10*3/mm3 8.11 8.52 7.91   HEMOGLOBIN g/dL 10.3* 10.0* 10.4*   HEMATOCRIT % 33.0* 31.9* 33.5*   PLATELETS 10*3/mm3 206 188 217         Results from last 7 days   Lab Units 11/19/23  0651 11/18/23  0655 11/17/23  0825 11/16/23  0627 11/15/23  1158   SODIUM mmol/L 138 135* 135*   < > 141   POTASSIUM mmol/L 4.1 4.2 3.9   < > 4.2   CHLORIDE mmol/L 98 96* 95*   < > 105   CO2 mmol/L 27.0 26.0 24.0   < > 25.0   BUN mg/dL 42* 27* 26*   < > 49*   CREATININE mg/dL 3.85* 2.99* 2.80*   < > 4.31*   CALCIUM mg/dL 9.6 9.2 7.9*   < > 9.5   EGFR mL/min/1.73 11.8* 16.0* 17.3*   < > 10.3*   BILIRUBIN mg/dL  --   --   --   --  0.3   ALK PHOS U/L  --   --   --   --  130*   ALT (SGPT) U/L  --   --   --   --  7   AST (SGOT) U/L  --   --   --   --  12   GLUCOSE mg/dL 236* 212* 211*   < > 139*    < > = values in this interval not displayed.       Radiology:   Imaging Results (Last 72 Hours)       ** No results found for the last 72 hours. **            Culture:  Blood Culture   Date Value Ref Range Status   11/15/2023 No growth at 24 hours  Preliminary   11/15/2023 No growth at 24 hours  Preliminary         Assessment    End stage renal disease on HD MWF  Type 2 diabetes  Hypertension  Peripheral vascular disease  Anemia of CKD  Metabolic encephalopathy--improving    Plan:  Dialysis next due 11/22  Monitor labs      Reji Valadez, HEMANT  11/21/2023  08:55 CST

## 2023-11-21 NOTE — PLAN OF CARE
Goal Outcome Evaluation:      - patient was disoriented to situation the entire shift but was alert with other questions. HR: . Cardene @ 5. Weak, doppler pulses. Room air. UOP: 300 mL. Highest temp: 99.0. left foot and lower leg pain (popliteal contusion/chief complaint). Restraints.       Problem: Adult Inpatient Plan of Care  Goal: Plan of Care Review  Outcome: Ongoing, Progressing  Goal: Patient-Specific Goal (Individualized)  Outcome: Ongoing, Progressing  Goal: Absence of Hospital-Acquired Illness or Injury  Outcome: Ongoing, Progressing  Intervention: Identify and Manage Fall Risk  Recent Flowsheet Documentation  Taken 11/21/2023 0600 by Shivam Fritz RN  Safety Promotion/Fall Prevention:   safety round/check completed   room organization consistent   clutter free environment maintained  Taken 11/21/2023 0500 by Shivam Fritz RN  Safety Promotion/Fall Prevention:   safety round/check completed   room organization consistent   clutter free environment maintained  Taken 11/21/2023 0400 by Shivam Fritz RN  Safety Promotion/Fall Prevention:   safety round/check completed   room organization consistent   clutter free environment maintained  Taken 11/21/2023 0300 by Shivam Fritz RN  Safety Promotion/Fall Prevention:   safety round/check completed   room organization consistent   clutter free environment maintained  Taken 11/21/2023 0200 by Shivam Fritz RN  Safety Promotion/Fall Prevention:   safety round/check completed   room organization consistent   clutter free environment maintained  Taken 11/21/2023 0100 by Shivam Fritz RN  Safety Promotion/Fall Prevention:   safety round/check completed   room organization consistent   clutter free environment maintained  Taken 11/21/2023 0000 by Shivam Fritz RN  Safety Promotion/Fall Prevention:   safety round/check completed   room organization consistent   clutter free environment maintained  Taken 11/20/2023 2300 by Shivam Fritz RN  Safety  Promotion/Fall Prevention:   safety round/check completed   room organization consistent   clutter free environment maintained  Taken 11/20/2023 2200 by Shivam Fritz RN  Safety Promotion/Fall Prevention:   safety round/check completed   room organization consistent   clutter free environment maintained  Taken 11/20/2023 2100 by Shivam Fritz RN  Safety Promotion/Fall Prevention:   safety round/check completed   room organization consistent   clutter free environment maintained  Taken 11/20/2023 2000 by Shivam Fritz RN  Safety Promotion/Fall Prevention:   safety round/check completed   room organization consistent   clutter free environment maintained  Taken 11/20/2023 1950 by Shivam Fritz RN  Safety Promotion/Fall Prevention:   safety round/check completed   room organization consistent   clutter free environment maintained  Taken 11/20/2023 1900 by Shivam Fritz RN  Safety Promotion/Fall Prevention:   safety round/check completed   room organization consistent   clutter free environment maintained  Intervention: Prevent Skin Injury  Recent Flowsheet Documentation  Taken 11/21/2023 0600 by Shivam Fritz RN  Body Position:   turned   supine  Taken 11/21/2023 0400 by Shivam Fritz RN  Body Position:   turned   left  Skin Protection:   adhesive use limited   incontinence pads utilized   skin-to-device areas padded   skin-to-skin areas padded   tubing/devices free from skin contact  Taken 11/21/2023 0200 by Shivam Fritz RN  Body Position:   supine   patient/family refused  Taken 11/21/2023 0000 by Shivam Fritz RN  Body Position:   turned   supine  Skin Protection:   adhesive use limited   incontinence pads utilized   skin-to-device areas padded   skin-to-skin areas padded   tubing/devices free from skin contact  Taken 11/20/2023 2200 by Shivam Fritz RN  Body Position:   turned   left  Taken 11/20/2023 2000 by Shivam Fritz RN  Body Position:   turned   right  Taken 11/20/2023 1950 by Shivam Fritz  RN  Skin Protection:   adhesive use limited   incontinence pads utilized   skin-to-device areas padded   skin-to-skin areas padded   tubing/devices free from skin contact  Intervention: Prevent and Manage VTE (Venous Thromboembolism) Risk  Recent Flowsheet Documentation  Taken 11/21/2023 0400 by Shivam Fritz RN  Activity Management: activity minimized  VTE Prevention/Management: other (see comments)  Range of Motion: ROM (range of motion) performed  Taken 11/21/2023 0000 by Shivam Fritz RN  Activity Management: activity minimized  VTE Prevention/Management: other (see comments)  Range of Motion: ROM (range of motion) performed  Taken 11/20/2023 1950 by Shivam Fritz RN  Activity Management: activity minimized  VTE Prevention/Management: (see mar) other (see comments)  Range of Motion: ROM (range of motion) performed  Intervention: Prevent Infection  Recent Flowsheet Documentation  Taken 11/21/2023 0600 by Shivam Fritz RN  Infection Prevention:   single patient room provided   rest/sleep promoted   hand hygiene promoted  Taken 11/21/2023 0500 by Shivam Fritz RN  Infection Prevention:   single patient room provided   rest/sleep promoted   hand hygiene promoted  Taken 11/21/2023 0400 by Shivam Fritz RN  Infection Prevention:   single patient room provided   rest/sleep promoted   hand hygiene promoted  Taken 11/21/2023 0300 by Shivam Fritz RN  Infection Prevention:   single patient room provided   rest/sleep promoted   hand hygiene promoted  Taken 11/21/2023 0200 by Shivam Fritz RN  Infection Prevention:   single patient room provided   rest/sleep promoted   hand hygiene promoted  Taken 11/21/2023 0100 by Shivam Fritz RN  Infection Prevention:   single patient room provided   rest/sleep promoted   hand hygiene promoted  Taken 11/21/2023 0000 by Shivam Fritz RN  Infection Prevention:   single patient room provided   rest/sleep promoted   hand hygiene promoted  Taken 11/20/2023 2300 by Shivam Fritz  RN  Infection Prevention:   single patient room provided   rest/sleep promoted   hand hygiene promoted  Taken 11/20/2023 2200 by Shivam Fritz RN  Infection Prevention:   single patient room provided   rest/sleep promoted   hand hygiene promoted  Taken 11/20/2023 2100 by Shivam Fritz RN  Infection Prevention:   single patient room provided   rest/sleep promoted   hand hygiene promoted  Taken 11/20/2023 2000 by Shivam Fritz RN  Infection Prevention:   single patient room provided   rest/sleep promoted   hand hygiene promoted  Taken 11/20/2023 1950 by Shivam Fritz RN  Infection Prevention:   single patient room provided   rest/sleep promoted   hand hygiene promoted  Taken 11/20/2023 1900 by Shivam Fritz RN  Infection Prevention:   single patient room provided   rest/sleep promoted   hand hygiene promoted  Goal: Optimal Comfort and Wellbeing  Outcome: Ongoing, Progressing  Intervention: Monitor Pain and Promote Comfort  Recent Flowsheet Documentation  Taken 11/21/2023 0430 by Shivam Fritz RN  Pain Management Interventions:   see MAR   relaxation techniques promoted   quiet environment facilitated   position adjusted  Taken 11/21/2023 0400 by Shivam Fritz RN  Pain Management Interventions:   see MAR   relaxation techniques promoted   quiet environment facilitated   position adjusted  Intervention: Provide Person-Centered Care  Recent Flowsheet Documentation  Taken 11/21/2023 0400 by Shivam Fritz RN  Trust Relationship/Rapport:   care explained   choices provided   reassurance provided   thoughts/feelings acknowledged  Taken 11/21/2023 0000 by Shivam Fritz RN  Trust Relationship/Rapport:   care explained   choices provided   reassurance provided   thoughts/feelings acknowledged  Taken 11/20/2023 1950 by Shivam Fritz RN  Trust Relationship/Rapport:   care explained   choices provided   reassurance provided   thoughts/feelings acknowledged  Goal: Readiness for Transition of Care  Outcome: Ongoing,  Progressing     Problem: Pain Acute  Goal: Acceptable Pain Control and Functional Ability  Outcome: Ongoing, Progressing  Intervention: Prevent or Manage Pain  Recent Flowsheet Documentation  Taken 11/21/2023 0600 by Shivam Fritz RN  Medication Review/Management: medications reviewed  Taken 11/21/2023 0500 by Shivam Fritz RN  Medication Review/Management: medications reviewed  Taken 11/21/2023 0400 by Shivam Fritz RN  Medication Review/Management: medications reviewed  Taken 11/21/2023 0300 by Shivam Fritz RN  Medication Review/Management: medications reviewed  Taken 11/21/2023 0200 by Shivam Fritz RN  Medication Review/Management: medications reviewed  Taken 11/21/2023 0100 by Shivam Fritz RN  Medication Review/Management: medications reviewed  Taken 11/21/2023 0000 by Shivam Fritz RN  Medication Review/Management: medications reviewed  Taken 11/20/2023 2300 by Shivam Fritz RN  Medication Review/Management: medications reviewed  Taken 11/20/2023 2200 by Shivam Fritz RN  Medication Review/Management: medications reviewed  Taken 11/20/2023 2100 by Shivam Fritz RN  Medication Review/Management: medications reviewed  Taken 11/20/2023 2000 by Shivam Fritz RN  Medication Review/Management: medications reviewed  Taken 11/20/2023 1950 by Shivam Fritz RN  Medication Review/Management: medications reviewed  Taken 11/20/2023 1900 by Shivam Fritz RN  Medication Review/Management: medications reviewed  Intervention: Develop Pain Management Plan  Recent Flowsheet Documentation  Taken 11/21/2023 0430 by Shivam Fritz RN  Pain Management Interventions:   see MAR   relaxation techniques promoted   quiet environment facilitated   position adjusted  Taken 11/21/2023 0400 by Shivam Fritz RN  Pain Management Interventions:   see MAR   relaxation techniques promoted   quiet environment facilitated   position adjusted  Intervention: Optimize Psychosocial Wellbeing  Recent Flowsheet Documentation  Taken  11/21/2023 0400 by Shivam Fritz RN  Diversional Activities: television  Taken 11/21/2023 0200 by Shivam Fritz RN  Diversional Activities: television  Taken 11/21/2023 0000 by Shivam Fritz RN  Diversional Activities: television  Taken 11/20/2023 2200 by Shivam Fritz RN  Diversional Activities: television  Taken 11/20/2023 2000 by Shivam Fritz RN  Diversional Activities: television  Taken 11/20/2023 1950 by Shivam Fritz RN  Diversional Activities: television     Problem: Adjustment to Illness (Chronic Kidney Disease)  Goal: Optimal Coping with Chronic Illness  Outcome: Ongoing, Progressing  Intervention: Support Psychosocial Response  Recent Flowsheet Documentation  Taken 11/21/2023 0400 by Shivam Fritz RN  Family/Support System Care: presence promoted  Taken 11/21/2023 0000 by Shivam Fritz RN  Family/Support System Care: presence promoted  Taken 11/20/2023 1950 by Shivam Fritz RN  Family/Support System Care: presence promoted     Problem: Electrolyte Imbalance (Chronic Kidney Disease)  Goal: Electrolyte Balance  Outcome: Ongoing, Progressing     Problem: Fluid Volume Excess (Chronic Kidney Disease)  Goal: Fluid Balance  Outcome: Ongoing, Progressing  Intervention: Monitor and Manage Hypervolemia  Recent Flowsheet Documentation  Taken 11/21/2023 0400 by Shivam Fritz RN  Skin Protection:   adhesive use limited   incontinence pads utilized   skin-to-device areas padded   skin-to-skin areas padded   tubing/devices free from skin contact  Taken 11/21/2023 0000 by Shivam Fritz RN  Skin Protection:   adhesive use limited   incontinence pads utilized   skin-to-device areas padded   skin-to-skin areas padded   tubing/devices free from skin contact  Taken 11/20/2023 1950 by Shivam Fritz RN  Skin Protection:   adhesive use limited   incontinence pads utilized   skin-to-device areas padded   skin-to-skin areas padded   tubing/devices free from skin contact     Problem: Functional Decline (Chronic Kidney  Disease)  Goal: Optimal Functional Ability  Outcome: Ongoing, Progressing  Intervention: Optimize Functional Ability  Recent Flowsheet Documentation  Taken 11/21/2023 0400 by Shivam Fritz RN  Activity Management: activity minimized  Taken 11/21/2023 0000 by Shivam Fritz RN  Activity Management: activity minimized  Taken 11/20/2023 1950 by Shivam Fritz RN  Activity Management: activity minimized     Problem: Hematologic Alteration (Chronic Kidney Disease)  Goal: Absence of Anemia Signs and Symptoms  Outcome: Ongoing, Progressing     Problem: Oral Intake Inadequate (Chronic Kidney Disease)  Goal: Optimal Oral Intake  Outcome: Ongoing, Progressing     Problem: Pain (Chronic Kidney Disease)  Goal: Acceptable Pain Control  Outcome: Ongoing, Progressing  Intervention: Prevent or Manage Pain  Recent Flowsheet Documentation  Taken 11/21/2023 0430 by Shivam Fritz RN  Pain Management Interventions:   see MAR   relaxation techniques promoted   quiet environment facilitated   position adjusted  Taken 11/21/2023 0400 by Shivam Fritz RN  Pain Management Interventions:   see MAR   relaxation techniques promoted   quiet environment facilitated   position adjusted     Problem: Renal Function Impairment (Chronic Kidney Disease)  Goal: Minimize Renal Failure Effects  Outcome: Ongoing, Progressing  Intervention: Monitor and Support Renal Function  Recent Flowsheet Documentation  Taken 11/21/2023 0600 by Shivam Fritz RN  Medication Review/Management: medications reviewed  Taken 11/21/2023 0500 by Shivam Fritz RN  Medication Review/Management: medications reviewed  Taken 11/21/2023 0400 by Shivam Fritz RN  Medication Review/Management: medications reviewed  Taken 11/21/2023 0300 by Shivam Fritz RN  Medication Review/Management: medications reviewed  Taken 11/21/2023 0200 by Shivam Fritz RN  Medication Review/Management: medications reviewed  Taken 11/21/2023 0100 by Shivam Fritz RN  Medication Review/Management:  medications reviewed  Taken 11/21/2023 0000 by Shivam Fritz RN  Medication Review/Management: medications reviewed  Taken 11/20/2023 2300 by Shivam Fritz RN  Medication Review/Management: medications reviewed  Taken 11/20/2023 2200 by Shivam Fritz RN  Medication Review/Management: medications reviewed  Taken 11/20/2023 2100 by Shivam Fritz RN  Medication Review/Management: medications reviewed  Taken 11/20/2023 2000 by Shivam Fritz RN  Medication Review/Management: medications reviewed  Taken 11/20/2023 1950 by Shivam Fritz RN  Medication Review/Management: medications reviewed  Taken 11/20/2023 1900 by Shivam Fritz RN  Medication Review/Management: medications reviewed     Problem: Fall Injury Risk  Goal: Absence of Fall and Fall-Related Injury  Outcome: Ongoing, Progressing  Intervention: Identify and Manage Contributors  Recent Flowsheet Documentation  Taken 11/21/2023 0600 by Shivam Fritz RN  Medication Review/Management: medications reviewed  Taken 11/21/2023 0500 by Shivam Fritz RN  Medication Review/Management: medications reviewed  Taken 11/21/2023 0400 by Shivam Fritz RN  Medication Review/Management: medications reviewed  Taken 11/21/2023 0300 by Shivam Fritz RN  Medication Review/Management: medications reviewed  Taken 11/21/2023 0200 by Shivam Fritz RN  Medication Review/Management: medications reviewed  Taken 11/21/2023 0100 by Shivam Fritz RN  Medication Review/Management: medications reviewed  Taken 11/21/2023 0000 by Shivam Fritz RN  Medication Review/Management: medications reviewed  Taken 11/20/2023 2300 by Shivam Fritz RN  Medication Review/Management: medications reviewed  Taken 11/20/2023 2200 by Shivam Fritz RN  Medication Review/Management: medications reviewed  Taken 11/20/2023 2100 by Shivam Fritz RN  Medication Review/Management: medications reviewed  Taken 11/20/2023 2000 by Shivam Fritz RN  Medication Review/Management: medications reviewed  Taken  11/20/2023 1950 by Shivam Fritz RN  Medication Review/Management: medications reviewed  Taken 11/20/2023 1900 by Shivam Fritz RN  Medication Review/Management: medications reviewed  Intervention: Promote Injury-Free Environment  Recent Flowsheet Documentation  Taken 11/21/2023 0600 by Shivam Fritz RN  Safety Promotion/Fall Prevention:   safety round/check completed   room organization consistent   clutter free environment maintained  Taken 11/21/2023 0500 by Shivam Fritz RN  Safety Promotion/Fall Prevention:   safety round/check completed   room organization consistent   clutter free environment maintained  Taken 11/21/2023 0400 by Shivam Fritz RN  Safety Promotion/Fall Prevention:   safety round/check completed   room organization consistent   clutter free environment maintained  Taken 11/21/2023 0300 by Shivam Fritz RN  Safety Promotion/Fall Prevention:   safety round/check completed   room organization consistent   clutter free environment maintained  Taken 11/21/2023 0200 by Shivam Fritz RN  Safety Promotion/Fall Prevention:   safety round/check completed   room organization consistent   clutter free environment maintained  Taken 11/21/2023 0100 by Shivam Fritz RN  Safety Promotion/Fall Prevention:   safety round/check completed   room organization consistent   clutter free environment maintained  Taken 11/21/2023 0000 by Shivam Fritz RN  Safety Promotion/Fall Prevention:   safety round/check completed   room organization consistent   clutter free environment maintained  Taken 11/20/2023 2300 by Shivam Fritz RN  Safety Promotion/Fall Prevention:   safety round/check completed   room organization consistent   clutter free environment maintained  Taken 11/20/2023 2200 by Shivam Fritz RN  Safety Promotion/Fall Prevention:   safety round/check completed   room organization consistent   clutter free environment maintained  Taken 11/20/2023 2100 by Shivam Fritz RN  Safety Promotion/Fall  Prevention:   safety round/check completed   room organization consistent   clutter free environment maintained  Taken 11/20/2023 2000 by Shivam Fritz RN  Safety Promotion/Fall Prevention:   safety round/check completed   room organization consistent   clutter free environment maintained  Taken 11/20/2023 1950 by Shivam Fritz RN  Safety Promotion/Fall Prevention:   safety round/check completed   room organization consistent   clutter free environment maintained  Taken 11/20/2023 1900 by Shivam Fritz RN  Safety Promotion/Fall Prevention:   safety round/check completed   room organization consistent   clutter free environment maintained     Problem: Restraint, Nonviolent  Goal: Absence of Harm or Injury  Outcome: Ongoing, Progressing  Intervention: Implement Least Restrictive Safety Strategies  Recent Flowsheet Documentation  Taken 11/21/2023 0600 by Shivam Fritz RN  Medical Device Protection:   IV pole/bag removed from visual field   torso covered   tubing secured  Taken 11/21/2023 0500 by Shivam Fritz RN  Medical Device Protection:   IV pole/bag removed from visual field   torso covered   tubing secured  Taken 11/21/2023 0400 by Shivam Fritz RN  Medical Device Protection:   IV pole/bag removed from visual field   torso covered   tubing secured  Less Restrictive Alternative:   sensory stimulation limited   bed alarm in use  De-Escalation Techniques:   reoriented   stimulation decreased  Diversional Activities: television  Taken 11/21/2023 0300 by Shivam Fritz RN  Medical Device Protection:   IV pole/bag removed from visual field   torso covered   other (see comments)  Taken 11/21/2023 0200 by Shivam Fritz RN  Medical Device Protection:   IV pole/bag removed from visual field   torso covered   tubing secured  Less Restrictive Alternative:   sensory stimulation limited   bed alarm in use  De-Escalation Techniques:   reoriented   stimulation decreased  Diversional Activities: television  Taken 11/21/2023  0100 by Shivam Fritz RN  Medical Device Protection:   IV pole/bag removed from visual field   torso covered   tubing secured  Taken 11/21/2023 0000 by Shivam Fritz RN  Medical Device Protection:   IV pole/bag removed from visual field   torso covered   tubing secured  Less Restrictive Alternative:   sensory stimulation limited   bed alarm in use  De-Escalation Techniques:   reoriented   stimulation decreased  Diversional Activities: television  Taken 11/20/2023 2300 by Shivam Fritz RN  Medical Device Protection:   IV pole/bag removed from visual field   torso covered   tubing secured  Taken 11/20/2023 2200 by Shivam Fritz RN  Medical Device Protection:   IV pole/bag removed from visual field   torso covered   tubing secured  Less Restrictive Alternative:   sensory stimulation limited   bed alarm in use  De-Escalation Techniques:   reoriented   stimulation decreased  Diversional Activities: television  Taken 11/20/2023 2100 by Shivam Fritz RN  Medical Device Protection:   IV pole/bag removed from visual field   torso covered   tubing secured  Taken 11/20/2023 2000 by Shivam Fritz RN  Medical Device Protection:   IV pole/bag removed from visual field   torso covered   tubing secured  Less Restrictive Alternative:   sensory stimulation limited   bed alarm in use  De-Escalation Techniques:   reoriented   stimulation decreased  Diversional Activities: television  Taken 11/20/2023 1950 by Shivam Fritz RN  Medical Device Protection:   IV pole/bag removed from visual field   torso covered   tubing secured  Diversional Activities: television  Taken 11/20/2023 1900 by Shivam Fritz RN  Medical Device Protection:   IV pole/bag removed from visual field   torso covered   tubing secured  Intervention: Protect Dignity, Rights, and Personal Wellbeing  Recent Flowsheet Documentation  Taken 11/21/2023 0400 by Shivam Fritz RN  Trust Relationship/Rapport:   care explained   choices provided   reassurance provided    thoughts/feelings acknowledged  Taken 11/21/2023 0000 by Shivam Fritz RN  Trust Relationship/Rapport:   care explained   choices provided   reassurance provided   thoughts/feelings acknowledged  Taken 11/20/2023 1950 by Shivam Fritz RN  Trust Relationship/Rapport:   care explained   choices provided   reassurance provided   thoughts/feelings acknowledged  Intervention: Protect Skin and Joint Integrity  Recent Flowsheet Documentation  Taken 11/21/2023 0600 by Shivam Fritz RN  Body Position:   turned   supine  Taken 11/21/2023 0400 by Shivam Fritz RN  Body Position:   turned   left  Range of Motion: ROM (range of motion) performed  Taken 11/21/2023 0200 by Shivam Fritz RN  Body Position:   supine   patient/family refused  Taken 11/21/2023 0000 by Shivam Fritz RN  Body Position:   turned   supine  Range of Motion: ROM (range of motion) performed  Taken 11/20/2023 2200 by Shivam Fritz RN  Body Position:   turned   left  Taken 11/20/2023 2000 by Shivam Fritz RN  Body Position:   turned   right  Taken 11/20/2023 1950 by Shivam Fritz RN  Range of Motion: ROM (range of motion) performed     Problem: Skin Injury Risk Increased  Goal: Skin Health and Integrity  Outcome: Ongoing, Progressing  Intervention: Optimize Skin Protection  Recent Flowsheet Documentation  Taken 11/21/2023 0600 by Shivam Fritz RN  Head of Bed (HOB) Positioning: HOB lowered  Taken 11/21/2023 0400 by Shivam Fritz RN  Pressure Reduction Techniques: weight shift assistance provided  Head of Bed (HOB) Positioning: HOB lowered  Pressure Reduction Devices: positioning supports utilized  Skin Protection:   adhesive use limited   incontinence pads utilized   skin-to-device areas padded   skin-to-skin areas padded   tubing/devices free from skin contact  Taken 11/21/2023 0200 by Shivam Fritz RN  Head of Bed (HOB) Positioning: HOB lowered  Taken 11/21/2023 0000 by Shivam Fritz RN  Pressure Reduction Techniques: weight shift assistance  provided  Head of Bed ([facility]) Positioning: HOB lowered  Pressure Reduction Devices: positioning supports utilized  Skin Protection:   adhesive use limited   incontinence pads utilized   skin-to-device areas padded   skin-to-skin areas padded   tubing/devices free from skin contact  Taken 11/20/2023 2200 by Shivam Fritz RN  Head of Bed ([facility]) Positioning: HOB lowered  Taken 11/20/2023 2000 by Shivam Fritz RN  Head of Bed ([facility]) Positioning: HOB lowered  Taken 11/20/2023 1950 by Shivam Fritz RN  Pressure Reduction Techniques: weight shift assistance provided  Pressure Reduction Devices: positioning supports utilized  Skin Protection:   adhesive use limited   incontinence pads utilized   skin-to-device areas padded   skin-to-skin areas padded   tubing/devices free from skin contact

## 2023-11-21 NOTE — PROGRESS NOTES
LOS: 6 days   Patient Care Team:  Provider, No Known as PCP - General    Chief Complaint: Transient chest pain     Subjective    Harmony Montoya is a 73 y.o. female who is being seen in follow-up.  Overnight appears to have improved  No chest pain  No palpitation  No presyncope  No syncope  Orthopnea  No paroxysmal nocturnal dyspnea  No bleeding issues  No falls  Has been confined to bed  Per vascular surgery angiogram was canceled  Latest test results as below  With medical management blood pressures have improved    Telemetry: no malignant arrhythmia. No significant pauses.    Review of Systems   Constitutional: No chills   Has fatigue   No fever.   HENT: Negative.    Eyes: Negative.    Respiratory: Negative for cough,   No chest wall soreness,   Shortness of breath,   no wheezing, no stridor.    Cardiovascular: As above  Gastrointestinal: Negative for abdominal distention,  No abdominal pain,   No blood in stool,   No constipation,   No diarrhea,   No nausea   No vomiting.   Endocrine: Negative.    Genitourinary: Negative for difficulty urinating, dysuria, flank pain and hematuria.   Musculoskeletal: Negative.    Skin: Negative for rash and wound.   Allergic/Immunologic: Negative.    Neurological: Negative for dizziness, syncope, weakness,   No light-headedness  No  headaches.   Hematological: Does not bruise/bleed easily.   Psychiatric/Behavioral: Negative for agitation or behavioral problems,   No confusion,   the patient is  nervous/anxious.       History:   History reviewed. No pertinent past medical history.  History reviewed. No pertinent surgical history.  Social History     Socioeconomic History    Marital status: Single   Tobacco Use    Smoking status: Former     Types: Cigarettes     Quit date: 2021     Years since quittin.5    Smokeless tobacco: Never   Vaping Use    Vaping Use: Never used   Substance and Sexual Activity    Alcohol use: Never    Drug use: Never     History reviewed. No  "pertinent family history.    Labs:  WBC WBC   Date Value Ref Range Status   11/19/2023 8.11 3.40 - 10.80 10*3/mm3 Final      HGB Hemoglobin   Date Value Ref Range Status   11/19/2023 10.3 (L) 12.0 - 15.9 g/dL Final      HCT Hematocrit   Date Value Ref Range Status   11/19/2023 33.0 (L) 34.0 - 46.6 % Final      Platelets Platelets   Date Value Ref Range Status   11/19/2023 206 140 - 450 10*3/mm3 Final      MCV MCV   Date Value Ref Range Status   11/19/2023 99.7 (H) 79.0 - 97.0 fL Final        Results from last 7 days   Lab Units 11/19/23  0651 11/18/23  0655 11/17/23  0825 11/16/23  0627 11/15/23  1158   SODIUM mmol/L 138 135* 135*   < > 141   POTASSIUM mmol/L 4.1 4.2 3.9   < > 4.2   CHLORIDE mmol/L 98 96* 95*   < > 105   CO2 mmol/L 27.0 26.0 24.0   < > 25.0   BUN mg/dL 42* 27* 26*   < > 49*   CREATININE mg/dL 3.85* 2.99* 2.80*   < > 4.31*   CALCIUM mg/dL 9.6 9.2 7.9*   < > 9.5   BILIRUBIN mg/dL  --   --   --   --  0.3   ALK PHOS U/L  --   --   --   --  130*   ALT (SGPT) U/L  --   --   --   --  7   AST (SGOT) U/L  --   --   --   --  12   GLUCOSE mg/dL 236* 212* 211*   < > 139*    < > = values in this interval not displayed.     Lab Results   Component Value Date    TROPONINT 129 (C) 11/20/2023     PT/INR:  No results found for: \"PROTIME\"/No results found for: \"INR\"    Imaging Results (Last 72 Hours)       ** No results found for the last 72 hours. **            Objective     Allergies   Allergen Reactions    Statins Swelling       Medication Review: Performed  Current Facility-Administered Medications   Medication Dose Route Frequency Provider Last Rate Last Admin    acetaminophen (TYLENOL) tablet 650 mg  650 mg Oral Q6H PRN Hernando, Kwame TENORIO, DO        amLODIPine (NORVASC) tablet 5 mg  5 mg Oral Q24H Christian Donovan MD   5 mg at 11/21/23 0806    aspirin chewable tablet 81 mg  81 mg Oral Daily Kwame Villagomez DO   81 mg at 11/21/23 0806    sennosides-docusate (PERICOLACE) 8.6-50 MG per tablet 2 tablet  2 tablet " Oral BID Kwame Villagomez DO   2 tablet at 11/21/23 0806    And    polyethylene glycol (MIRALAX) packet 17 g  17 g Oral Daily PRN Kwame Villagomez DO        And    bisacodyl (DULCOLAX) EC tablet 5 mg  5 mg Oral Daily PRN Kwame Villagomez DO        And    bisacodyl (DULCOLAX) suppository 10 mg  10 mg Rectal Daily PRN Kwame Villagomez DO        ceFAZolin 2000 mg IVPB in 100 mL NS (MBP)  2,000 mg Intravenous Once Kwame Villagomez DO        Chlorhexidine Gluconate Cloth 2 % pads 1 application   1 application  Topical Once Salvatore Miller MD        Chlorhexidine Gluconate Cloth 2 % pads 1 application   1 application  Topical Q24H Salvatore Miller MD   1 application  at 11/21/23 0500    cloNIDine (CATAPRES) tablet 0.1 mg  0.1 mg Oral Q6H PRN Christian Donovan MD   0.1 mg at 11/20/23 1546    dextrose (D50W) (25 g/50 mL) IV injection 25 g  25 g Intravenous Q15 Min PRN Salvatore Miller MD        dextrose (GLUTOSE) oral gel 15 g  15 g Oral Q15 Min PRN Salvatore Miller MD        glucagon (GLUCAGEN) injection 1 mg  1 mg Intramuscular Q15 Min PRN Salvatore Miller MD        heparin (porcine) injection 1,700 Units  30 Units/kg Intravenous PRN Kwame Villagomez DO        heparin (porcine) injection 3,400 Units  60 Units/kg Intravenous PRN Kwame Villagomez DO   3,400 Units at 11/20/23 0055    hydrALAZINE (APRESOLINE) injection 10 mg  10 mg Intravenous Q6H PRN Jane Armstrong MD   10 mg at 11/20/23 0033    HYDROcodone-acetaminophen (NORCO) 5-325 MG per tablet 1 tablet  1 tablet Oral Q4H PRN Kwame Villagomez DO   1 tablet at 11/21/23 0449    HYDROmorphone (DILAUDID) injection 0.5 mg  0.5 mg Intravenous Q4H PRN Kwame Villagomez DO   0.5 mg at 11/21/23 0813    Insulin Lispro (humaLOG) injection 2-9 Units  2-9 Units Subcutaneous 4x Daily AC & at Bedtime Salvatore Miller MD   4 Units at 11/21/23 0808    isosorbide mononitrate (IMDUR) 24 hr tablet 30 mg  30 mg Oral  "Q24H Christian Donovan MD   30 mg at 11/21/23 0806    labetalol (NORMODYNE,TRANDATE) injection 10 mg  10 mg Intravenous Q4H PRN Jane Armstrong MD   10 mg at 11/20/23 0049    lisinopril (PRINIVIL,ZESTRIL) tablet 2.5 mg  2.5 mg Oral BID Salvatore Miller MD   2.5 mg at 11/21/23 0806    mupirocin (BACTROBAN) 2 % nasal ointment 1 application   1 application  Each Nare BID Salvatore Miller MD   1 application  at 11/21/23 0806    naloxone (NARCAN) injection 0.4 mg  0.4 mg Intravenous Q5 Min PRN BickingKwame DO        niCARdipine (CARDENE) 25 mg in 250 mL NS infusion kit  5-15 mg/hr Intravenous Titrated Jane Armstrong MD 50 mL/hr at 11/21/23 0601 5 mg/hr at 11/21/23 0601    nitroglycerin (NITROSTAT) SL tablet 0.4 mg  0.4 mg Sublingual Q5 Min PRN BickingKwame DO   0.4 mg at 11/20/23 0437    ondansetron (ZOFRAN) injection 4 mg  4 mg Intravenous Q6H PRN BickingKwame, DO   4 mg at 11/20/23 0126    pantoprazole (PROTONIX) EC tablet 40 mg  40 mg Oral Q AM BickingKwame DO   40 mg at 11/21/23 0602    sodium chloride 0.9 % flush 10 mL  10 mL Intravenous Q12H BickingKwame, DO   10 mL at 11/20/23 2110    sodium chloride 0.9 % flush 10 mL  10 mL Intravenous PRN BickingKwame, DO           Vital Sign Min/Max for last 24 hours  Temp  Min: 97.4 °F (36.3 °C)  Max: 99 °F (37.2 °C)   BP  Min: 119/61  Max: 169/136   Pulse  Min: 81  Max: 106   No data recorded   SpO2  Min: 88 %  Max: 100 %   No data recorded   Weight  Min: 59.4 kg (131 lb)  Max: 59.4 kg (131 lb)     Flowsheet Rows      Flowsheet Row First Filed Value   Admission Height 160 cm (63\") Documented at 11/15/2023 1045   Admission Weight 56.7 kg (125 lb) Documented at 11/15/2023 1045                Physical Exam:    General Appearance: Awake, alert, in no acute distress  Eyes: Pupils equal and reactive    Ears: Appear intact with no abnormalities noted  Nose: Nares normal, no drainage  Neck: supple, trachea midline, no " carotid bruit and no JVD  Back: no kyphosis present,    Lungs: respirations regular, respirations even and respirations unlabored  Heart: normal S1, S2, no significant murmurs   No gallops or rubs  no rub and no click  Abdomen: normal bowel sounds, no tenderness   Skin: no bleeding, bruising or rash  Extremities: no cyanosis  Psychiatric/Behavioral: Negative for agitation, behavioral problems, confusion, the patient does  appear to be nervous/anxious.       Results Review:   I reviewed the patient's new clinical results.  I reviewed the patient's new imaging results and agree with the interpretation.  I reviewed the patient's other test results and agree with the interpretation  I personally viewed and interpreted the patient's EKG/Telemetry data    Discussed with patient  Updated patient regarding any new or relevant abnormalities on review of records or any new findings on physical exam.   Mentioned to patient about purpose of visit and desirable health short and long term goals and objectives.     Reviewed available prior notes, consults, prior visits, laboratory findings, radiology and cardiology relevant reports.   Updated chart as applicable.   I have reviewed the patient's medical history in detail and updated the computerized patient record as relevant.          Assessment & Plan       Ischemic rest pain of lower extremity    End stage renal disease on dialysis    Type 2 diabetes mellitus, with long-term current use of insulin    Primary hypertension    Coronary artery disease involving native coronary artery of native heart without angina pectoris    Chronic GERD      Plan    Care plan discussed with patient  Latest test results reviewed  Echocardiogram images reviewed  Will follow-up official report of this  Vascular surgery input reviewed from yesterday  Plans to hold off angiogram of the lower extremity  Possible amputation due to chronic disease per Dr. Villagomez  At some point in time will require  myocardial ischemia workup  Currently patient is not in a condition to undergo stress  Telemetry  Deep vein thrombosis prophylaxis/precautions  Appropriate diet, fluid, sodium, caffeine, stimulants intake   Questions were encouraged, asked and answered to the patient's  understanding and satisfaction.  Compliance to diet and medications       Christian Donovan MD  11/21/23  08:30 CST    EMR Dragon/Transcription was used to dictate part of this note

## 2023-11-21 NOTE — THERAPY TREATMENT NOTE
Acute Care - Physical Therapy Treatment Note  [facility]     Patient Name: Harmony Montoya  : 1950  MRN: [id number]  Today's Date: 2023      Visit Dx:     ICD-10-CM ICD-9-CM   1. End stage renal disease on dialysis  N18.6 585.6    Z99.2 V45.11   2. Arterial occlusion, lower extremity  I70.209 444.22   3. Intractable pain  R52 780.96   4. Ischemic rest pain of lower extremity  M79.606 729.5    I99.8 459.9   5. Impaired mobility [Z74.09]  Z74.09 799.89     Patient Active Problem List   Diagnosis    Pleural effusion    End stage renal disease on dialysis    Ischemic rest pain of lower extremity    Type 2 diabetes mellitus, with long-term current use of insulin    Primary hypertension    Coronary artery disease involving native coronary artery of native heart without angina pectoris    Chronic GERD    Acute encephalopathy    Hypertensive encephalopathy     History reviewed. No pertinent past medical history.  History reviewed. No pertinent surgical history.  PT Assessment (last 12 hours)       PT Evaluation and Treatment       Row Name 23 1010          Physical Therapy Time and Intention    Subjective Information no complaints  -     Document Type therapy note (daily note)  -     Mode of Treatment physical therapy  -       Row Name 23 1010          General Information    Existing Precautions/Restrictions fall  -[facility] Name 23 1010          Pain    Pretreatment Pain Rating 0/10 - no pain  -     Posttreatment Pain Rating 0/10 - no pain  -       Row Name 23 1010          Bed Mobility    Supine-Sit [place] (Bed Mobility) verbal cues;minimum assist (75% patient effort)  -     Sit-Supine [place] (Bed Mobility) verbal cues;contact guard  -     Assistive Device (Bed Mobility) head of bed elevated  -       Row Name 23 1010          Transfers    Comment, (Transfers) stoo x 4  -       Row Name 23 1010          Sit-Stand Transfer    Sit-Stand  [place] (Transfers) verbal cues;minimum assist (75% patient effort)  -       Row Name 11/21/23 1010          Stand-Sit Transfer    Stand-Sit [place] (Transfers) verbal cues;contact guard  -       Row Name 11/21/23 1010          Gait/Stairs (Locomotion)    [place] Level (Gait) minimum assist (75% patient effort)  -     Assistive Device (Gait) --    -     Distance in Feet (Gait) marched in place 10 steps  -       Row Name 11/21/23 1010          Balance    Static Sitting Balance contact guard  -     Position, Sitting Balance sitting edge of bed  -     Static Standing Balance contact guard  -     Position/Device Used, Standing Balance supported  [facility]     Balance Interventions sitting;dynamic reaching  -       Row Name 11/21/23 1010          Motor Skills    Therapeutic Exercise hip;knee;ankle  -[facility] Name 11/21/23 1010          Hip (Therapeutic Exercise)    Hip (Therapeutic Exercise) AROM (active range of motion)  -     Hip AROM (Therapeutic Exercise) bilateral;flexion;aBduction;aDduction;external rotation;internal rotation;sitting;supine  -[facility] Name 11/21/23 1010          Knee (Therapeutic Exercise)    Knee (Therapeutic Exercise) AROM (active range of motion)  -     Knee AROM (Therapeutic Exercise) LAQ (long arc quad);heel slides;sitting;supine  -       Row Name 11/21/23 1010          Ankle (Therapeutic Exercise)    Ankle (Therapeutic Exercise) AROM (active range of motion)  -     Ankle AROM (Therapeutic Exercise) bilateral;dorsiflexion;plantarflexion  -       Row Name             Wound 11/21/23 0942 Left anterior fourth toe    Wound - Properties Group Placement Date: 11/21/23  -MG Placement Time: 0942  -MG Present on Original Admission: Y  -MG Side: Left  -MG Orientation: anterior  -MG Location: fourth toe  -MG    Retired Wound - Properties Group Placement Date: 11/21/23  -MG Placement Time: 0942  -MG Present on Original Admission: Y  -MG Side: Left  -MG  Orientation: anterior  -MG Location: fourth toe  -MG    Retired Wound - Properties Group Date first assessed: 11/21/23  -MG Time first assessed: 0942  -MG Present on Original Admission: Y  -MG Side: Left  -MG Location: fourth toe  -MG      Row Name 11/21/23 1010          Positioning and Restraints    Pre-Treatment Position in bed  -JULIA     Post Treatment Position bed  -JULIA     In Bed notified nsg;fowlers;call light within reach;encouraged to call for assist;with family/caregiver  -JULIA               User Key  (r) = Recorded By, (t) = Taken By, (c) = Cosigned By      Initials Name Provider Type    Ivon Christopher, KATE Physical Therapist Assistant    Carlos Hawkins, RN Registered Nurse                    Physical Therapy Education       Title: PT OT SLP Therapies (In Progress)       Topic: Physical Therapy (In Progress)       Point: Mobility training (In Progress)       Learning Progress Summary             Patient Acceptance, E,D, NR by JULIA at 11/21/2023 1159    Comment: Safety with sit to stand to sit transfers    Acceptance, E, NR by SILVINA at 11/20/2023 1300    Comment: benefits of activity, progression of PT                         Point: Home exercise program (Not Started)       Learner Progress:  Not documented in this visit.              Point: Body mechanics (Not Started)       Learner Progress:  Not documented in this visit.              Point: Precautions (Not Started)       Learner Progress:  Not documented in this visit.                              User Key       Initials Effective Dates Name Provider Type Discipline    SILVINA 02/03/23 -  Bashir Burns PT DPT Physical Therapist PT    JULIA 02/03/23 -  Ivon Briceno PTA Physical Therapist Assistant PT                  PT Recommendation and Plan         Outcome Measures       Row Name 11/21/23 1100             How much help from another person do you currently need...    Turning from your back to your side while in flat bed without using bedrails? 4  -JULIA       Moving from lying on back to sitting on the side of a flat bed without bedrails? 4  -JULIA      Moving to and from a bed to a chair (including a wheelchair)? 3  -JULIA      Standing up from a chair using your arms (e.g., wheelchair, bedside chair)? 3  -JULIA      Climbing 3-5 steps with a railing? 1  -JULIA      To walk in hospital room? 2  -JULIA      AM-PAC 6 Clicks Score (PT) 17  -JULIA      Highest Level of Mobility Goal 5 --> Static standing  -JULIA                User Key  (r) = Recorded By, (t) = Taken By, (c) = Cosigned By      Initials Name Provider Type    Ivon Christopher PTA Physical Therapist Assistant                     Time Calculation:    PT Charges       Row Name 11/21/23 1202             Time Calculation    Start Time 1010  -JULIA      Stop Time 1035  -JULIA      Time Calculation (min) 25 min  -JULIA         Timed Charges    34031 - PT Therapeutic Activity Minutes 25  -JULIA         Total Minutes    Timed Charges Total Minutes 25  -JULIA       Total Minutes 25  -JULIA                User Key  (r) = Recorded By, (t) = Taken By, (c) = Cosigned By      Initials Name Provider Type    Ivon Christopher PTA Physical Therapist Assistant                  Therapy Charges for Today       Code Description Service Date Service Provider Modifiers Qty    97361294013 HC PT THERAPEUTIC ACT EA 15 MIN 11/21/2023 Ivon Briceno PTA GP 2            PT G-Codes  Outcome Measure Options: AM-PAC 6 Clicks Basic Mobility (PT)  AM-PAC 6 Clicks Score (PT): 17    Ivon Briceno PTA  11/21/2023

## 2023-11-21 NOTE — PLAN OF CARE
Goal Outcome Evaluation:  Plan of Care Reviewed With: patient        Progress: no change     Pt admitted to 3A from unit this shift. Pt is a&ox4 but can be forgetful at times. Up x1 pivot from wheelchair to bed upon arrival to room. BG tx and monitored per order. Wound care to be complete per order.     1818: ETA pt allowed wound care to be completed and then immediately requested dressing be taken off.

## 2023-11-22 ENCOUNTER — APPOINTMENT (OUTPATIENT)
Dept: CARDIOLOGY | Facility: HOSPITAL | Age: 73
DRG: 299 | End: 2023-11-22
Payer: OTHER GOVERNMENT

## 2023-11-22 LAB
ALBUMIN SERPL-MCNC: 3.5 G/DL (ref 3.5–5.2)
ALBUMIN/GLOB SERPL: 0.9 G/DL
ALP SERPL-CCNC: 124 U/L (ref 39–117)
ALT SERPL W P-5'-P-CCNC: 10 U/L (ref 1–33)
ANION GAP SERPL CALCULATED.3IONS-SCNC: 17 MMOL/L (ref 5–15)
AST SERPL-CCNC: 21 U/L (ref 1–32)
BASOPHILS # BLD AUTO: 0.06 10*3/MM3 (ref 0–0.2)
BASOPHILS NFR BLD AUTO: 0.5 % (ref 0–1.5)
BH CV REST NUCLEAR ISOTOPE DOSE: 11.2 MCI
BH CV STRESS BP STAGE 1: NORMAL
BH CV STRESS COMMENTS STAGE 1: NORMAL
BH CV STRESS DOSE REGADENOSON STAGE 1: 0.4
BH CV STRESS DURATION MIN STAGE 1: 0
BH CV STRESS DURATION SEC STAGE 1: 10
BH CV STRESS HR STAGE 1: 100
BH CV STRESS NUCLEAR ISOTOPE DOSE: 35.8 MCI
BH CV STRESS PROTOCOL 1: NORMAL
BH CV STRESS RECOVERY BP: NORMAL MMHG
BH CV STRESS RECOVERY HR: 93 BPM
BH CV STRESS STAGE 1: 1
BILIRUB SERPL-MCNC: 0.3 MG/DL (ref 0–1.2)
BUN SERPL-MCNC: 48 MG/DL (ref 8–23)
BUN/CREAT SERPL: 10.9 (ref 7–25)
CALCIUM SPEC-SCNC: 9.3 MG/DL (ref 8.6–10.5)
CHLORIDE SERPL-SCNC: 94 MMOL/L (ref 98–107)
CO2 SERPL-SCNC: 21 MMOL/L (ref 22–29)
CREAT SERPL-MCNC: 4.39 MG/DL (ref 0.57–1)
DEPRECATED RDW RBC AUTO: 50.4 FL (ref 37–54)
EGFRCR SERPLBLD CKD-EPI 2021: 10.1 ML/MIN/1.73
EOSINOPHIL # BLD AUTO: 0.2 10*3/MM3 (ref 0–0.4)
EOSINOPHIL NFR BLD AUTO: 1.7 % (ref 0.3–6.2)
ERYTHROCYTE [DISTWIDTH] IN BLOOD BY AUTOMATED COUNT: 13.5 % (ref 12.3–15.4)
GLOBULIN UR ELPH-MCNC: 3.7 GM/DL
GLUCOSE BLDC GLUCOMTR-MCNC: 135 MG/DL (ref 70–130)
GLUCOSE BLDC GLUCOMTR-MCNC: 267 MG/DL (ref 70–130)
GLUCOSE BLDC GLUCOMTR-MCNC: 267 MG/DL (ref 70–130)
GLUCOSE BLDC GLUCOMTR-MCNC: 97 MG/DL (ref 70–130)
GLUCOSE SERPL-MCNC: 199 MG/DL (ref 65–99)
HCT VFR BLD AUTO: 34.7 % (ref 34–46.6)
HGB BLD-MCNC: 10.5 G/DL (ref 12–15.9)
IMM GRANULOCYTES # BLD AUTO: 0.05 10*3/MM3 (ref 0–0.05)
IMM GRANULOCYTES NFR BLD AUTO: 0.4 % (ref 0–0.5)
LYMPHOCYTES # BLD AUTO: 1.86 10*3/MM3 (ref 0.7–3.1)
LYMPHOCYTES NFR BLD AUTO: 15.7 % (ref 19.6–45.3)
MAXIMAL PREDICTED HEART RATE: 147 BPM
MCH RBC QN AUTO: 30.9 PG (ref 26.6–33)
MCHC RBC AUTO-ENTMCNC: 30.3 G/DL (ref 31.5–35.7)
MCV RBC AUTO: 102.1 FL (ref 79–97)
MONOCYTES # BLD AUTO: 0.95 10*3/MM3 (ref 0.1–0.9)
MONOCYTES NFR BLD AUTO: 8 % (ref 5–12)
NEUTROPHILS NFR BLD AUTO: 73.7 % (ref 42.7–76)
NEUTROPHILS NFR BLD AUTO: 8.74 10*3/MM3 (ref 1.7–7)
NRBC BLD AUTO-RTO: 0 /100 WBC (ref 0–0.2)
PERCENT MAX PREDICTED HR: 68.03 %
PLATELET # BLD AUTO: 225 10*3/MM3 (ref 140–450)
PMV BLD AUTO: 11 FL (ref 6–12)
POTASSIUM SERPL-SCNC: 4 MMOL/L (ref 3.5–5.2)
PROT SERPL-MCNC: 7.2 G/DL (ref 6–8.5)
RBC # BLD AUTO: 3.4 10*6/MM3 (ref 3.77–5.28)
SODIUM SERPL-SCNC: 132 MMOL/L (ref 136–145)
STRESS BASELINE BP: NORMAL MMHG
STRESS BASELINE HR: 82 BPM
STRESS PERCENT HR: 80 %
STRESS POST EXERCISE DUR SEC: 10 SEC
STRESS POST PEAK BP: NORMAL MMHG
STRESS POST PEAK HR: 100 BPM
STRESS TARGET HR: 125 BPM
WBC NRBC COR # BLD AUTO: 11.86 10*3/MM3 (ref 3.4–10.8)

## 2023-11-22 PROCEDURE — 25010000002 REGADENOSON 0.4 MG/5ML SOLUTION: Performed by: INTERNAL MEDICINE

## 2023-11-22 PROCEDURE — 78452 HT MUSCLE IMAGE SPECT MULT: CPT | Performed by: INTERNAL MEDICINE

## 2023-11-22 PROCEDURE — 93017 CV STRESS TEST TRACING ONLY: CPT

## 2023-11-22 PROCEDURE — A9502 TC99M TETROFOSMIN: HCPCS | Performed by: INTERNAL MEDICINE

## 2023-11-22 PROCEDURE — 82948 REAGENT STRIP/BLOOD GLUCOSE: CPT

## 2023-11-22 PROCEDURE — 80053 COMPREHEN METABOLIC PANEL: CPT | Performed by: INTERNAL MEDICINE

## 2023-11-22 PROCEDURE — 85025 COMPLETE CBC W/AUTO DIFF WBC: CPT | Performed by: FAMILY MEDICINE

## 2023-11-22 PROCEDURE — 25010000002 HYDROMORPHONE PER 4 MG: Performed by: FAMILY MEDICINE

## 2023-11-22 PROCEDURE — 63710000001 INSULIN LISPRO (HUMAN) PER 5 UNITS: Performed by: FAMILY MEDICINE

## 2023-11-22 PROCEDURE — 97530 THERAPEUTIC ACTIVITIES: CPT

## 2023-11-22 PROCEDURE — 93018 CV STRESS TEST I&R ONLY: CPT | Performed by: INTERNAL MEDICINE

## 2023-11-22 PROCEDURE — 78452 HT MUSCLE IMAGE SPECT MULT: CPT

## 2023-11-22 PROCEDURE — 99232 SBSQ HOSP IP/OBS MODERATE 35: CPT | Performed by: INTERNAL MEDICINE

## 2023-11-22 PROCEDURE — 0 TECHNETIUM TETROFOSMIN KIT: Performed by: INTERNAL MEDICINE

## 2023-11-22 RX ORDER — HYDROMORPHONE HYDROCHLORIDE 1 MG/ML
0.5 INJECTION, SOLUTION INTRAMUSCULAR; INTRAVENOUS; SUBCUTANEOUS EVERY 4 HOURS PRN
Status: CANCELLED | OUTPATIENT
Start: 2023-11-22 | End: 2023-11-25

## 2023-11-22 RX ORDER — QUETIAPINE FUMARATE 25 MG/1
25 TABLET, FILM COATED ORAL NIGHTLY
Status: DISCONTINUED | OUTPATIENT
Start: 2023-11-22 | End: 2023-11-24 | Stop reason: HOSPADM

## 2023-11-22 RX ORDER — REGADENOSON 0.08 MG/ML
0.4 INJECTION, SOLUTION INTRAVENOUS ONCE
Status: COMPLETED | OUTPATIENT
Start: 2023-11-22 | End: 2023-11-22

## 2023-11-22 RX ORDER — HYDROCODONE BITARTRATE AND ACETAMINOPHEN 7.5; 325 MG/1; MG/1
1 TABLET ORAL EVERY 6 HOURS PRN
Status: CANCELLED | OUTPATIENT
Start: 2023-11-22 | End: 2023-11-29

## 2023-11-22 RX ADMIN — HYDROMORPHONE HYDROCHLORIDE 0.5 MG: 1 INJECTION, SOLUTION INTRAMUSCULAR; INTRAVENOUS; SUBCUTANEOUS at 08:14

## 2023-11-22 RX ADMIN — HYDROMORPHONE HYDROCHLORIDE 0.5 MG: 1 INJECTION, SOLUTION INTRAMUSCULAR; INTRAVENOUS; SUBCUTANEOUS at 13:47

## 2023-11-22 RX ADMIN — HYDROCODONE BITARTRATE AND ACETAMINOPHEN 1 TABLET: 5; 325 TABLET ORAL at 21:26

## 2023-11-22 RX ADMIN — ISOSORBIDE MONONITRATE 30 MG: 30 TABLET, EXTENDED RELEASE ORAL at 13:30

## 2023-11-22 RX ADMIN — Medication 10 ML: at 21:50

## 2023-11-22 RX ADMIN — AMLODIPINE BESYLATE 5 MG: 5 TABLET ORAL at 13:30

## 2023-11-22 RX ADMIN — Medication 10 ML: at 13:31

## 2023-11-22 RX ADMIN — ASPIRIN 81 MG: 81 TABLET, CHEWABLE ORAL at 13:30

## 2023-11-22 RX ADMIN — LISINOPRIL 2.5 MG: 2.5 TABLET ORAL at 21:26

## 2023-11-22 RX ADMIN — REGADENOSON 0.4 MG: 0.08 INJECTION, SOLUTION INTRAVENOUS at 10:50

## 2023-11-22 RX ADMIN — QUETIAPINE FUMARATE 25 MG: 25 TABLET, FILM COATED ORAL at 21:26

## 2023-11-22 RX ADMIN — DOCUSATE SODIUM 50 MG AND SENNOSIDES 8.6 MG 2 TABLET: 8.6; 5 TABLET, FILM COATED ORAL at 13:30

## 2023-11-22 RX ADMIN — INSULIN LISPRO 6 UNITS: 100 INJECTION, SOLUTION INTRAVENOUS; SUBCUTANEOUS at 08:13

## 2023-11-22 RX ADMIN — LISINOPRIL 2.5 MG: 2.5 TABLET ORAL at 13:30

## 2023-11-22 RX ADMIN — INSULIN LISPRO 6 UNITS: 100 INJECTION, SOLUTION INTRAVENOUS; SUBCUTANEOUS at 17:16

## 2023-11-22 RX ADMIN — DOCUSATE SODIUM 50 MG AND SENNOSIDES 8.6 MG 2 TABLET: 8.6; 5 TABLET, FILM COATED ORAL at 21:26

## 2023-11-22 RX ADMIN — HYDROCODONE BITARTRATE AND ACETAMINOPHEN 1 TABLET: 5; 325 TABLET ORAL at 03:59

## 2023-11-22 RX ADMIN — TETROFOSMIN 1 DOSE: 1.38 INJECTION, POWDER, LYOPHILIZED, FOR SOLUTION INTRAVENOUS at 08:54

## 2023-11-22 RX ADMIN — PANTOPRAZOLE SODIUM 40 MG: 40 TABLET, DELAYED RELEASE ORAL at 06:08

## 2023-11-22 RX ADMIN — TETROFOSMIN 1 DOSE: 1.38 INJECTION, POWDER, LYOPHILIZED, FOR SOLUTION INTRAVENOUS at 10:35

## 2023-11-22 NOTE — PLAN OF CARE
Goal Outcome Evaluation:  Plan of Care Reviewed With: other (see comments)        Progress: no change  Outcome Evaluation: Ntn assessment completed. Unable to assess for oral intake due to insufficient intake per flowsheet. [facility] Boost Glucose Control BID. Con to follow for plan of care.

## 2023-11-22 NOTE — PLAN OF CARE
Goal Outcome Evaluation:  Plan of Care Reviewed With: patient        Progress: no change  Outcome Evaluation: alert, disoriented to situation, c/o left foot pain, prn pain meds given with relief, refused dressing to toe, up with assistance to BSC, voiding small amounts, Dialysis Mon. Wed. Fri, bed alarm set, safety maintained

## 2023-11-22 NOTE — PROGRESS NOTES
Nephrology ([facility]) Progress Note      Patient:  Harmony Montoya  YOB: 1950  Date of Service: 11/22/2023  MRN: [id number]   Acct: [id number]   Primary Care Physician: Provider, No Known  Advance Directive:   Code Status and Medical Interventions:   Ordered at: 11/15/23 5817     Code Status (Patient has no pulse and is not breathing):    CPR (Attempt to Resuscitate)     Medical Interventions (Patient has pulse or is breathing):    Full Support     Admit Date: 11/15/2023       Hospital Day: 7  Referring Provider: No ref. provider found      Patient personally seen and examined.  Complete chart including Consults, Notes, Operative Reports, Labs, Cardiology, and Radiology studies reviewed as able.        Subjective:  Harmony Montoya is a 73 y.o. female for whom we were consulted for evaluation and treatment of end stage renal disease. Maintenance hemodialysis Monday Wednesday Friday at [facility]. Missed HD on 11/15. History of type 2 diabetes and hypertension. Presented with pain in left foot. Admitted for vascular evaluation. Underwent make-up dialysis on 11/16. Tolerated treatment well but later developed some confusion and was moved to CCU. Tolerated HD on 11/18 &11/20. Plans for angiogram have now been put on hold per Dr Villagomez.  Moved to medical floor on 11/21.    Today is awake, alert.  No new complaint or overnight issues.      Allergies:  Statins    Home Meds:  Medications Prior to Admission   Medication Sig Dispense Refill Last Dose    Ascorbic Acid (Vitamin C) 500 MG capsule Take 2 capsules by mouth Daily.   11/15/2023    aspirin 81 MG chewable tablet Chew 1 tablet Daily.   11/14/2023    carvedilol (COREG) 25 MG tablet Take 1 tablet by mouth 2 (Two) Times a Day With Meals.   11/15/2023    clopidogrel (PLAVIX) 75 MG tablet Take 1 tablet by mouth Daily.   11/14/2023    diphenhydrAMINE (BENADRYL) 25 mg capsule Take 1 capsule by mouth Daily.   11/14/2023     insulin glargine (LANTUS, SEMGLEE) 100 UNIT/ML injection Inject 7 Units under the skin into the appropriate area as directed Daily.   11/15/2023    lisinopril (PRINIVIL,ZESTRIL) 20 MG tablet Take 0.5 tablets by mouth Daily.   11/15/2023    omeprazole (priLOSEC) 40 MG capsule Take 1 capsule by mouth Daily.   11/15/2023    vitamin D3 125 MCG (5000 UT) capsule capsule Take 1 capsule by mouth Daily.   11/15/2023    insulin regular (humuLIN R,novoLIN R) 100 UNIT/ML injection Inject 6 Units under the skin into the appropriate area as directed Daily Before Supper.       Polyvinyl Alcohol-Povidone PF (HYPOTEARS) 1.4-0.6 % ophthalmic solution Administer 1 drop to both eyes 4 (Four) Times a Day.          Medicines:  Current Facility-Administered Medications   Medication Dose Route Frequency Provider Last Rate Last Admin    acetaminophen (TYLENOL) tablet 650 mg  650 mg Oral Q6H PRN Salvatore Miller MD        amLODIPine (NORVASC) tablet 5 mg  5 mg Oral Q24H Salvatore Miller MD   5 mg at 11/21/23 0806    aspirin chewable tablet 81 mg  81 mg Oral Daily Salvatore Miller MD   81 mg at 11/21/23 0806    sennosides-docusate (PERICOLACE) 8.6-50 MG per tablet 2 tablet  2 tablet Oral BID Salvatore Miller MD   2 tablet at 11/21/23 0806    And    polyethylene glycol (MIRALAX) packet 17 g  17 g Oral Daily PRN Salvatore Miller MD        And    bisacodyl (DULCOLAX) EC tablet 5 mg  5 mg Oral Daily PRN Salvatore Miller MD        And    bisacodyl (DULCOLAX) suppository 10 mg  10 mg Rectal Daily PRN Salvatore Miller MD        cloNIDine (CATAPRES) tablet 0.1 mg  0.1 mg Oral Q6H PRN Salvatore Miller MD   0.1 mg at 11/20/23 1546    dextrose (D50W) (25 g/50 mL) IV injection 25 g  25 g Intravenous Q15 Min PRN Salvatore Miller MD        dextrose (GLUTOSE) oral gel 15 g  15 g Oral Q15 Min PRN Salvatore Miller MD        glucagon (GLUCAGEN) injection 1 mg  1 mg Intramuscular  Q15 Min PRN Salvatore Miller MD        heparin (porcine) injection 1,700 Units  30 Units/kg Intravenous PRN Salvatore Miller MD        heparin (porcine) injection 3,400 Units  60 Units/kg Intravenous PRN Salvatore Miller MD   3,400 Units at 11/20/23 0055    hydrALAZINE (APRESOLINE) injection 10 mg  10 mg Intravenous Q6H PRN Salvatore Miller MD   10 mg at 11/20/23 0033    HYDROcodone-acetaminophen (NORCO) 5-325 MG per tablet 1 tablet  1 tablet Oral Q4H PRN Salvatore Miller MD   1 tablet at 11/22/23 0359    HYDROmorphone (DILAUDID) injection 0.5 mg  0.5 mg Intravenous Q4H PRN Salvatore Miller MD   0.5 mg at 11/22/23 0814    Insulin Lispro (humaLOG) injection 2-9 Units  2-9 Units Subcutaneous 4x Daily AC & at Bedtime Salvatore Miller MD   6 Units at 11/22/23 0813    isosorbide mononitrate (IMDUR) 24 hr tablet 30 mg  30 mg Oral Q24H Salvatore Miller MD   30 mg at 11/21/23 0806    labetalol (NORMODYNE,TRANDATE) injection 10 mg  10 mg Intravenous Q4H PRN Salvatore Miller MD   10 mg at 11/20/23 0049    lisinopril (PRINIVIL,ZESTRIL) tablet 2.5 mg  2.5 mg Oral BID Salvatore Miller MD   2.5 mg at 11/21/23 2100    naloxone (NARCAN) injection 0.4 mg  0.4 mg Intravenous Q5 Min PRN Salvatore Miller MD        nitroglycerin (NITROSTAT) SL tablet 0.4 mg  0.4 mg Sublingual Q5 Min PRN Salvatore Miller MD   0.4 mg at 11/20/23 0437    ondansetron (ZOFRAN) injection 4 mg  4 mg Intravenous Q6H PRN Salvtaore Miller MD   4 mg at 11/20/23 0126    pantoprazole (PROTONIX) EC tablet 40 mg  40 mg Oral Q AM Salvatore Miller MD   40 mg at 11/22/23 0608    regadenoson (LEXISCAN) injection 0.4 mg  0.4 mg Intravenous Once Christian Donovan MD        sodium chloride 0.9 % flush 10 mL  10 mL Intravenous Q12H Salvatore Miller MD   10 mL at 11/21/23 2101    sodium chloride 0.9 % flush 10 mL  10 mL Intravenous PRN Salvatore Miller MD  "          Past Medical History:  Past Medical History:   Diagnosis Date    Diabetes mellitus     Hypertension        Past Surgical History:  History reviewed. No pertinent surgical history.    Family History  History reviewed. No pertinent family history.    Social History  Social History     Socioeconomic History    Marital status: Single   Tobacco Use    Smoking status: Former     Types: Cigarettes     Quit date: 2021     Years since quittin.5    Smokeless tobacco: Never   Vaping Use    Vaping Use: Never used   Substance and Sexual Activity    Alcohol use: Never    Drug use: Never       Review of Systems:  History obtained from chart review and the patient  General ROS: No fever or chills  Respiratory ROS: No cough, shortness of breath, wheezing  Cardiovascular ROS: No chest pain or palpitations  Gastrointestinal ROS: No abdominal pain or melena  Genito-Urinary ROS: No dysuria or hematuria  Psych ROS: No anxiety and depression  14 point ROS reviewed with the patient and negative except as noted above and in the HPI unless unable to obtain.    Objective:  Patient Vitals for the past 24 hrs:   BP Temp Temp src Pulse Resp SpO2 Height Weight   23 1037 139/54 -- -- 82 -- -- 160 cm (63\") 59.4 kg (131 lb)   23 0754 149/60 97.8 °F (36.6 °C) Oral 87 16 100 % -- --   23 0348 136/63 97.6 °F (36.4 °C) Oral 83 16 100 % -- --   23 0027 147/70 97.6 °F (36.4 °C) Oral 85 16 100 % -- --   23 2045 153/67 97.7 °F (36.5 °C) Oral 91 16 100 % -- --   23 1524 110/50 98 °F (36.7 °C) Oral 84 18 100 % -- --   23 1345 -- -- -- 76 -- -- -- --   23 1330 -- -- -- 82 -- -- -- --   23 1315 -- -- -- 80 -- -- -- --   23 1300 138/56 -- -- 82 -- -- -- --   23 1245 129/90 -- -- 84 -- -- -- --   23 1230 -- -- -- 83 -- -- -- --   23 1215 124/50 -- -- 83 -- -- -- --   23 1200 115/60 98.7 °F (37.1 °C) Oral 84 -- -- -- --   23 1145 127/57 -- -- 86 -- -- -- -- "   11/21/23 1115 130/46 -- -- 91 -- -- -- --   11/21/23 1110 108/94 -- -- 82 -- -- -- --     No intake or output data in the 24 hours ending 11/22/23 1050    General: awake/alert   Chest:  clear to auscultation bilaterally without respiratory distress  CVS: regular rate and rhythm  Abdominal: soft, nontender, positive bowel sounds  Extremities: no cyanosis or edema  Skin: warm and dry without rash      Labs:  Results from last 7 days   Lab Units 11/22/23  0903 11/19/23  0651 11/18/23  0655   WBC 10*3/mm3 11.86* 8.11 8.52   HEMOGLOBIN g/dL 10.5* 10.3* 10.0*   HEMATOCRIT % 34.7 33.0* 31.9*   PLATELETS 10*3/mm3 225 206 188         Results from last 7 days   Lab Units 11/22/23  0903 11/19/23  0651 11/18/23  0655 11/16/23  0627 11/15/23  1158   SODIUM mmol/L 132* 138 135*   < > 141   POTASSIUM mmol/L 4.0 4.1 4.2   < > 4.2   CHLORIDE mmol/L 94* 98 96*   < > 105   CO2 mmol/L 21.0* 27.0 26.0   < > 25.0   BUN mg/dL 48* 42* 27*   < > 49*   CREATININE mg/dL 4.39* 3.85* 2.99*   < > 4.31*   CALCIUM mg/dL 9.3 9.6 9.2   < > 9.5   EGFR mL/min/1.73 10.1* 11.8* 16.0*   < > 10.3*   BILIRUBIN mg/dL 0.3  --   --   --  0.3   ALK PHOS U/L 124*  --   --   --  130*   ALT (SGPT) U/L 10  --   --   --  7   AST (SGOT) U/L 21  --   --   --  12   GLUCOSE mg/dL 199* 236* 212*   < > 139*    < > = values in this interval not displayed.       Radiology:   Imaging Results (Last 72 Hours)       ** No results found for the last 72 hours. **            Culture:  Blood Culture   Date Value Ref Range Status   11/15/2023 No growth at 24 hours  Preliminary   11/15/2023 No growth at 24 hours  Preliminary         Assessment    End stage renal disease on HD MWF  Type 2 diabetes  Hypertension  Peripheral vascular disease  Anemia of CKD  Metabolic encephalopathy--improving    Plan:  Dialysis today  Monitor labs      Reji Valadez, APRN  11/22/2023  10:50 CST

## 2023-11-22 NOTE — PLAN OF CARE
Goal Outcome Evaluation:  Plan of Care Reviewed With: patient        Progress: improving  Outcome Evaluation: PT tx completed. Pt sitting edge of bed. Pleasantly confused.  Stood at bedside x 2 HHA( would not use wx), refused to attempt ambulation. Performed ex's BLE. Recommend SNF for cont PT.

## 2023-11-22 NOTE — THERAPY TREATMENT NOTE
Acute Care - Physical Therapy Treatment Note  [facility]     Patient Name: Harmony Montoya  : 1950  MRN: [id number]  Today's Date: 2023      Visit Dx:     ICD-10-CM ICD-9-CM   1. End stage renal disease on dialysis  N18.6 585.6    Z99.2 V45.11   2. Arterial occlusion, lower extremity  I70.209 444.22   3. Intractable pain  R52 780.96   4. Ischemic rest pain of lower extremity  M79.606 729.5    I99.8 459.9   5. Impaired mobility [Z74.09]  Z74.09 799.89     Patient Active Problem List   Diagnosis    Pleural effusion    End stage renal disease on dialysis    Ischemic rest pain of lower extremity    Type 2 diabetes mellitus, with long-term current use of insulin    Primary hypertension    Coronary artery disease involving native coronary artery of native heart without angina pectoris    Chronic GERD    Acute encephalopathy    Hypertensive encephalopathy     Past Medical History:   Diagnosis Date    Diabetes mellitus     Hypertension      History reviewed. No pertinent surgical history.  PT Assessment (last 12 hours)       PT Evaluation and Treatment       Row Name 23 [facility] 23 1117       Physical Therapy Time and Intention    Subjective Information no complaints  -KJ --    Document Type therapy note (daily note)  -KJ --    Mode of Treatment physical therapy  -KJ physical therapy  -KJ    Patient Effort fair  -KJ --    Comment sitting edge of bed  -KJ down for ALEXIA  -KJ      Row Name 23 1335          General Information    Existing Precautions/Restrictions fall  -KJ       Row Name 23 1335          Pain    Pretreatment Pain Rating 0/10 - no pain  -KJ     Posttreatment Pain Rating 0/10 - no pain  -KJ       Row Name 23 1335          Bed Mobility    Comment, (Bed Mobility) sitting edge of bed  -KJ       Row Name 23 1335          Sit-Stand Transfer    Sit-Stand [place] (Transfers) verbal cues;minimum assist (75% patient effort);2 person assist  -KJ     Comment, (Sit-Stand  Transfer) tried r wx, but she did not want to stand. With another person she stood x 2. Would not take any steps  -KJ       Row Name 11/22/23 1335          Stand-Sit Transfer    Stand-Sit Mobile (Transfers) verbal cues;minimum assist (75% patient effort)  -KJ       Row Name 11/22/23 1335          Gait/Stairs (Locomotion)    Comment, (Gait/Stairs) would not attempt any steps.  -KJ       Row Name 11/22/23 1335          Balance    Balance Assessment sitting static balance  -KJ     Static Sitting Balance independent  -KJ     Position, Sitting Balance unsupported;sitting edge of bed  -KJ       Row Name             Wound 11/21/23 0942 Left anterior fourth toe    Wound - Properties Group Placement Date: 11/21/23  -MG Placement Time: 0942  -MG Present on Original Admission: Y  -MG Side: Left  -MG Orientation: anterior  -MG Location: fourth toe  -MG    Retired Wound - Properties Group Placement Date: 11/21/23  -MG Placement Time: 0942  -MG Present on Original Admission: Y  -MG Side: Left  -MG Orientation: anterior  -MG Location: fourth toe  -MG    Retired Wound - Properties Group Date first assessed: 11/21/23  -MG Time first assessed: 0942  -MG Present on Original Admission: Y  -MG Side: Left  -MG Location: fourth toe  -MG      Row Name 11/22/23 1335          Positioning and Restraints    Pre-Treatment Position in bed  -KJ     Post Treatment Position bed  -KJ     In Bed call light within reach;sitting EOB  -KJ               User Key  (r) = Recorded By, (t) = Taken By, (c) = Cosigned By      Initials Name Provider Type    Alyse Cerda PTA Physical Therapist Assistant    Carlos Hawkins, RN Registered Nurse                    Physical Therapy Education       Title: PT OT SLP Therapies (In Progress)       Topic: Physical Therapy (In Progress)       Point: Mobility training (In Progress)       Learning Progress Summary             Patient Acceptance, E,D, NR by JULIA at 11/21/2023 1186    Comment: Safety with sit to  stand to sit transfers    Acceptance, E, NR by SILVINA at 11/20/2023 1300    Comment: benefits of activity, progression of PT                         Point: Home exercise program (Not Started)       Learner Progress:  Not documented in this visit.              Point: Body mechanics (Not Started)       Learner Progress:  Not documented in this visit.              Point: Precautions (Not Started)       Learner Progress:  Not documented in this visit.                              User Key       Initials Effective Dates Name Provider Type Discipline    SILVINA 02/03/23 -  Bashir Burns, PT DPT Physical Therapist PT    JULIA 02/03/23 -  Ivon Briceno, PTA Physical Therapist Assistant PT                  PT Recommendation and Plan     Plan of Care Reviewed With: patient  Progress: improving  Outcome Evaluation: PT tx completed. Pt sitting edge of bed. Pleasantly confused.  Stood at bedside x 2 HHA( would not use wx), refused to attempt ambulation. Performed ex's BLE. Recommend SNF for cont PT.   Outcome Measures       Row Name 11/22/23 1500 11/21/23 1100          How much help from another person do you currently need...    Turning from your back to your side while in flat bed without using bedrails? 4  -KJ 4  -JULIA     Moving from lying on back to sitting on the side of a flat bed without bedrails? 4  -KJ 4  -JULIA     Moving to and from a bed to a chair (including a wheelchair)? 3  -KJ 3  -JULIA     Standing up from a chair using your arms (e.g., wheelchair, bedside chair)? 2  -KJ 3  -JULIA     Climbing 3-5 steps with a railing? 1  -KJ 1  -JULIA     To walk in hospital room? 2  -KJ 2  -JULIA     AM-PAC 6 Clicks Score (PT) 16  -KJ 17  -JULIA     Highest Level of Mobility Goal 5 --> Static standing  -KJ 5 --> Static standing  -JULIA        Functional Assessment    Outcome Measure Options AM-PAC 6 Clicks Basic Mobility (PT)  -KJ --               User Key  (r) = Recorded By, (t) = Taken By, (c) = Cosigned By      Initials Name Provider Type    KJ  Alyse Tomlinson PTA Physical Therapist Assistant    Ivon Christopher PTA Physical Therapist Assistant                     Time Calculation:    PT Charges       Row Name 11/22/23 1502             Time Calculation    Start Time 1335  -KJ      Stop Time 1400  -KJ      Time Calculation (min) 25 min  -KJ      PT Received On 11/22/23  -KJ      PT Goal Re-Cert Due Date 11/30/23  -KJ         Time Calculation- PT    Total Timed Code Minutes- PT 25 minute(s)  -KJ                User Key  (r) = Recorded By, (t) = Taken By, (c) = Cosigned By      Initials Name Provider Type    Alyse Cerda PTA Physical Therapist Assistant                  Therapy Charges for Today       Code Description Service Date Service Provider Modifiers Qty    [id number] HC PT THERAPEUTIC ACT EA 15 MIN 11/22/2023 Alyse Tomlinson PTA GP 2            PT G-Codes  Outcome Measure Options: AM-PAC 6 Clicks Basic Mobility (PT)  AM-PAC 6 Clicks Score (PT): 16    Alyse Tomlinson PTA  11/22/2023

## 2023-11-22 NOTE — PROGRESS NOTES
"   LOS: 7 days   Patient Care Team:  Provider, No Known as PCP - General    Chief Complaint: Transient chest pain     Subjective    Harmony Montoya is a 73 y.o. female who is being seen in follow-up.  Much more awake  Sitting up in bed and able to carry on a conversation and smiling  No chest pain  No palpitation  No presyncope  No syncope  Orthopnea  No paroxysmal nocturnal dyspnea  No bleeding issues  No falls   Results of echocardiogram as below    Telemetry: no malignant arrhythmia. No significant pauses.    Review of Systems   Constitutional: No chills   Has fatigue   No fever.   HENT: Negative.    Eyes: Negative.    Respiratory: Negative for cough,   No chest wall soreness,   Shortness of breath,   no wheezing, no stridor.    Cardiovascular: As above  Gastrointestinal: Negative for abdominal distention,  No abdominal pain,   No blood in stool,   No constipation,   No diarrhea,   No nausea   No vomiting.   Endocrine: Negative.    Genitourinary: Negative for difficulty urinating, dysuria, flank pain and hematuria.   Musculoskeletal: Negative.    Skin: Negative for rash and wound.   Allergic/Immunologic: Negative.    Neurological: Negative for dizziness, syncope, weakness,   No light-headedness  No  headaches.   Hematological: Does not bruise/bleed easily.   Psychiatric/Behavioral: Negative for agitation or behavioral problems,   No confusion,   the patient is  nervous/anxious.       History:   History reviewed. No pertinent past medical history.  History reviewed. No pertinent surgical history.  Social History     Socioeconomic History    Marital status: Single   Tobacco Use    Smoking status: Former     Types: Cigarettes     Quit date: 2021     Years since quittin.5    Smokeless tobacco: Never   Vaping Use    Vaping Use: Never used   Substance and Sexual Activity    Alcohol use: Never    Drug use: Never     History reviewed. No pertinent family history.    Labs:  WBC No results found for: \"WBC\"     HGB No " "results found for: \"HGB\"     HCT No results found for: \"HCT\"     Platelets No results found for: \"PLT\"     MCV No results found for: \"MCV\"       Results from last 7 days   Lab Units 11/19/23  0651 11/18/23  0655 11/17/23  0825 11/16/23  0627 11/15/23  1158   SODIUM mmol/L 138 135* 135*   < > 141   POTASSIUM mmol/L 4.1 4.2 3.9   < > 4.2   CHLORIDE mmol/L 98 96* 95*   < > 105   CO2 mmol/L 27.0 26.0 24.0   < > 25.0   BUN mg/dL 42* 27* 26*   < > 49*   CREATININE mg/dL 3.85* 2.99* 2.80*   < > 4.31*   CALCIUM mg/dL 9.6 9.2 7.9*   < > 9.5   BILIRUBIN mg/dL  --   --   --   --  0.3   ALK PHOS U/L  --   --   --   --  130*   ALT (SGPT) U/L  --   --   --   --  7   AST (SGOT) U/L  --   --   --   --  12   GLUCOSE mg/dL 236* 212* 211*   < > 139*    < > = values in this interval not displayed.     Lab Results   Component Value Date    TROPONINT 129 (C) 11/20/2023     PT/INR:  No results found for: \"PROTIME\"/No results found for: \"INR\"    Imaging Results (Last 72 Hours)       ** No results found for the last 72 hours. **            Objective     Allergies   Allergen Reactions    Statins Swelling       Medication Review: Performed  Current Facility-Administered Medications   Medication Dose Route Frequency Provider Last Rate Last Admin    acetaminophen (TYLENOL) tablet 650 mg  650 mg Oral Q6H PRN Salvatore Miller MD        amLODIPine (NORVASC) tablet 5 mg  5 mg Oral Q24H Salvatore Miller MD   5 mg at 11/21/23 0806    aspirin chewable tablet 81 mg  81 mg Oral Daily Salvatore Miller MD   81 mg at 11/21/23 0806    sennosides-docusate (PERICOLACE) 8.6-50 MG per tablet 2 tablet  2 tablet Oral BID Salvatore Miller MD   2 tablet at 11/21/23 0806    And    polyethylene glycol (MIRALAX) packet 17 g  17 g Oral Daily PRN Salvatore Miller MD        And    bisacodyl (DULCOLAX) EC tablet 5 mg  5 mg Oral Daily PRN Salvatore Miller MD        And    bisacodyl (DULCOLAX) suppository 10 mg  10 mg Rectal " Daily PRN Salvatore Miller MD        cloNIDine (CATAPRES) tablet 0.1 mg  0.1 mg Oral Q6H PRN Salvatore Miller MD   0.1 mg at 11/20/23 1546    dextrose (D50W) (25 g/50 mL) IV injection 25 g  25 g Intravenous Q15 Min PRN Salvatore Miller MD        dextrose (GLUTOSE) oral gel 15 g  15 g Oral Q15 Min PRN Salvatore Miller MD        glucagon (GLUCAGEN) injection 1 mg  1 mg Intramuscular Q15 Min PRN Salvatore Miller MD        heparin (porcine) injection 1,700 Units  30 Units/kg Intravenous PRN Salvatore Miller MD        heparin (porcine) injection 3,400 Units  60 Units/kg Intravenous PRN Salvatore Miller MD   3,400 Units at 11/20/23 0055    hydrALAZINE (APRESOLINE) injection 10 mg  10 mg Intravenous Q6H PRN Salvatore Miller MD   10 mg at 11/20/23 0033    HYDROcodone-acetaminophen (NORCO) 5-325 MG per tablet 1 tablet  1 tablet Oral Q4H PRN Salvatore Miller MD   1 tablet at 11/22/23 0359    HYDROmorphone (DILAUDID) injection 0.5 mg  0.5 mg Intravenous Q4H PRN Salvatore Miller MD   0.5 mg at 11/21/23 2100    Insulin Lispro (humaLOG) injection 2-9 Units  2-9 Units Subcutaneous 4x Daily AC & at Bedtime Salvatore Miller MD   2 Units at 11/21/23 2113    isosorbide mononitrate (IMDUR) 24 hr tablet 30 mg  30 mg Oral Q24H Salvatore Miller MD   30 mg at 11/21/23 0806    labetalol (NORMODYNE,TRANDATE) injection 10 mg  10 mg Intravenous Q4H PRN Salvatore Miller MD   10 mg at 11/20/23 0049    lisinopril (PRINIVIL,ZESTRIL) tablet 2.5 mg  2.5 mg Oral BID Salvatore Miller MD   2.5 mg at 11/21/23 2100    naloxone (NARCAN) injection 0.4 mg  0.4 mg Intravenous Q5 Min PRN Salvtaore Miller MD        nitroglycerin (NITROSTAT) SL tablet 0.4 mg  0.4 mg Sublingual Q5 Min PRN Salvatore Miller MD   0.4 mg at 11/20/23 0437    ondansetron (ZOFRAN) injection 4 mg  4 mg Intravenous Q6H PRN Salvatore Miller MD   4 mg at  "11/20/23 0126    pantoprazole (PROTONIX) EC tablet 40 mg  40 mg Oral Q AM Salvatore Miller MD   40 mg at 11/22/23 0608    sodium chloride 0.9 % flush 10 mL  10 mL Intravenous Q12H Salvatore Miller MD   10 mL at 11/21/23 2101    sodium chloride 0.9 % flush 10 mL  10 mL Intravenous PRN Salvatore Miller MD           Vital Sign Min/Max for last 24 hours  Temp  Min: 97.6 °F (36.4 °C)  Max: 98.7 °F (37.1 °C)   BP  Min: 104/87  Max: 153/67   Pulse  Min: 76  Max: 93   Resp  Min: 16  Max: 18   SpO2  Min: 95 %  Max: 100 %   No data recorded   No data recorded     Flowsheet Rows      Flowsheet Row First Filed Value   Admission Height 160 cm (63\") Documented at 11/15/2023 1045   Admission Weight 56.7 kg (125 lb) Documented at 11/15/2023 1045            Results for orders placed during the hospital encounter of 11/15/23    Adult Transthoracic Echo Complete W/ Cont if Necessary Per Protocol    Interpretation Summary    Left ventricular systolic function is low normal. Left ventricular ejection fraction appears to be 41 - 45%.    The following left ventricular wall segments are akinetic: apical anterior, apical lateral, apical inferior, apical septal, apex and mid anteroseptal.    Left ventricular diastolic function was indeterminate.    Estimated right ventricular systolic pressure from tricuspid regurgitation is normal (<35 mmHg).      Physical Exam:    General Appearance: Awake, alert, in no acute distress  Eyes: Pupils equal and reactive    Ears: Appear intact with no abnormalities noted  Nose: Nares normal, no drainage  Neck: supple, trachea midline, no carotid bruit and no JVD  Back: no kyphosis present,    Lungs: respirations regular, respirations even and respirations unlabored  Heart: normal S1, S2, no significant murmurs   No gallops or rubs  no rub and no click  Abdomen: normal bowel sounds, no tenderness   Skin: no bleeding, bruising or rash  Extremities: no cyanosis  Psychiatric/Behavioral: " Negative for agitation, behavioral problems, confusion, the patient does  appear to be nervous/anxious.       Results Review:   I reviewed the patient's new clinical results.  I reviewed the patient's new imaging results and agree with the interpretation.  I reviewed the patient's other test results and agree with the interpretation  I personally viewed and interpreted the patient's EKG/Telemetry data    Discussed with patient  Updated patient regarding any new or relevant abnormalities on review of records or any new findings on physical exam.   Mentioned to patient about purpose of visit and desirable health short and long term goals and objectives.     Reviewed available prior notes, consults, prior visits, laboratory findings, radiology and cardiology relevant reports.   Updated chart as applicable.   I have reviewed the patient's medical history in detail and updated the computerized patient record as relevant.          Assessment & Plan       Ischemic rest pain of lower extremity    End stage renal disease on dialysis    Type 2 diabetes mellitus, with long-term current use of insulin    Primary hypertension    Coronary artery disease involving native coronary artery of native heart without angina pectoris    Chronic GERD    Acute encephalopathy    Hypertensive encephalopathy      Plan    Overall much more awake today  Will order Lexiscan Cardiolite stress test  Care plan discussed with her as well as nursing by bedside  Further recommendation pending results of above  Echocardiogram results reviewed      Vascular surgery input reviewed from yesterday  Plans to hold off angiogram of the lower extremity  Possible amputation due to chronic disease per Dr. Villagomez  At some point in time will require myocardial ischemia workup  Currently patient is not in a condition to undergo stress  Telemetry    Deep vein thrombosis prophylaxis/precautions  Appropriate diet, fluid, sodium, caffeine, stimulants intake   Questions  were encouraged, asked and answered to the patient's  understanding and satisfaction.  Compliance to diet and medications       Christian Donovan MD  11/22/23  07:09 CST    EMR Dragon/Transcription was used to dictate part of this note

## 2023-11-23 ENCOUNTER — APPOINTMENT (OUTPATIENT)
Dept: CT IMAGING | Facility: HOSPITAL | Age: 73
DRG: 299 | End: 2023-11-23
Payer: OTHER GOVERNMENT

## 2023-11-23 LAB
ALBUMIN SERPL-MCNC: 3.5 G/DL (ref 3.5–5.2)
ALBUMIN/GLOB SERPL: 1.1 G/DL
ALP SERPL-CCNC: 119 U/L (ref 39–117)
ALT SERPL W P-5'-P-CCNC: 10 U/L (ref 1–33)
AMMONIA BLD-SCNC: 19 UMOL/L (ref 11–51)
ANION GAP SERPL CALCULATED.3IONS-SCNC: 14 MMOL/L (ref 5–15)
AST SERPL-CCNC: 16 U/L (ref 1–32)
BASOPHILS # BLD AUTO: 0.06 10*3/MM3 (ref 0–0.2)
BASOPHILS NFR BLD AUTO: 0.5 % (ref 0–1.5)
BILIRUB SERPL-MCNC: 0.3 MG/DL (ref 0–1.2)
BUN SERPL-MCNC: 58 MG/DL (ref 8–23)
BUN/CREAT SERPL: 10.7 (ref 7–25)
CALCIUM SPEC-SCNC: 9.1 MG/DL (ref 8.6–10.5)
CHLORIDE SERPL-SCNC: 95 MMOL/L (ref 98–107)
CO2 SERPL-SCNC: 24 MMOL/L (ref 22–29)
CREAT SERPL-MCNC: 5.44 MG/DL (ref 0.57–1)
DEPRECATED RDW RBC AUTO: 50.1 FL (ref 37–54)
EGFRCR SERPLBLD CKD-EPI 2021: 7.8 ML/MIN/1.73
EOSINOPHIL # BLD AUTO: 0.26 10*3/MM3 (ref 0–0.4)
EOSINOPHIL NFR BLD AUTO: 2.2 % (ref 0.3–6.2)
ERYTHROCYTE [DISTWIDTH] IN BLOOD BY AUTOMATED COUNT: 13.4 % (ref 12.3–15.4)
GLOBULIN UR ELPH-MCNC: 3.1 GM/DL
GLUCOSE BLDC GLUCOMTR-MCNC: 140 MG/DL (ref 70–130)
GLUCOSE BLDC GLUCOMTR-MCNC: 212 MG/DL (ref 70–130)
GLUCOSE BLDC GLUCOMTR-MCNC: 224 MG/DL (ref 70–130)
GLUCOSE BLDC GLUCOMTR-MCNC: 262 MG/DL (ref 70–130)
GLUCOSE SERPL-MCNC: 221 MG/DL (ref 65–99)
HCT VFR BLD AUTO: 29.9 % (ref 34–46.6)
HGB BLD-MCNC: 9.1 G/DL (ref 12–15.9)
IMM GRANULOCYTES # BLD AUTO: 0.06 10*3/MM3 (ref 0–0.05)
IMM GRANULOCYTES NFR BLD AUTO: 0.5 % (ref 0–0.5)
LYMPHOCYTES # BLD AUTO: 1.82 10*3/MM3 (ref 0.7–3.1)
LYMPHOCYTES NFR BLD AUTO: 15.3 % (ref 19.6–45.3)
MCH RBC QN AUTO: 31 PG (ref 26.6–33)
MCHC RBC AUTO-ENTMCNC: 30.4 G/DL (ref 31.5–35.7)
MCV RBC AUTO: 101.7 FL (ref 79–97)
MONOCYTES # BLD AUTO: 1.48 10*3/MM3 (ref 0.1–0.9)
MONOCYTES NFR BLD AUTO: 12.5 % (ref 5–12)
NEUTROPHILS NFR BLD AUTO: 69 % (ref 42.7–76)
NEUTROPHILS NFR BLD AUTO: 8.19 10*3/MM3 (ref 1.7–7)
NRBC BLD AUTO-RTO: 0 /100 WBC (ref 0–0.2)
PLATELET # BLD AUTO: 237 10*3/MM3 (ref 140–450)
PMV BLD AUTO: 11.1 FL (ref 6–12)
POTASSIUM SERPL-SCNC: 4.3 MMOL/L (ref 3.5–5.2)
PROT SERPL-MCNC: 6.6 G/DL (ref 6–8.5)
RBC # BLD AUTO: 2.94 10*6/MM3 (ref 3.77–5.28)
SODIUM SERPL-SCNC: 133 MMOL/L (ref 136–145)
TSH SERPL DL<=0.05 MIU/L-ACNC: 0.91 UIU/ML (ref 0.27–4.2)
WBC NRBC COR # BLD AUTO: 11.87 10*3/MM3 (ref 3.4–10.8)

## 2023-11-23 PROCEDURE — 99232 SBSQ HOSP IP/OBS MODERATE 35: CPT | Performed by: INTERNAL MEDICINE

## 2023-11-23 PROCEDURE — 84443 ASSAY THYROID STIM HORMONE: CPT | Performed by: PSYCHIATRY & NEUROLOGY

## 2023-11-23 PROCEDURE — 85025 COMPLETE CBC W/AUTO DIFF WBC: CPT | Performed by: INTERNAL MEDICINE

## 2023-11-23 PROCEDURE — 70450 CT HEAD/BRAIN W/O DYE: CPT

## 2023-11-23 PROCEDURE — 97530 THERAPEUTIC ACTIVITIES: CPT

## 2023-11-23 PROCEDURE — 82607 VITAMIN B-12: CPT | Performed by: PSYCHIATRY & NEUROLOGY

## 2023-11-23 PROCEDURE — 82140 ASSAY OF AMMONIA: CPT | Performed by: PSYCHIATRY & NEUROLOGY

## 2023-11-23 PROCEDURE — 63710000001 INSULIN LISPRO (HUMAN) PER 5 UNITS: Performed by: FAMILY MEDICINE

## 2023-11-23 PROCEDURE — 82746 ASSAY OF FOLIC ACID SERUM: CPT | Performed by: PSYCHIATRY & NEUROLOGY

## 2023-11-23 PROCEDURE — 63710000001 INSULIN DETEMIR PER 5 UNITS: Performed by: INTERNAL MEDICINE

## 2023-11-23 PROCEDURE — 80053 COMPREHEN METABOLIC PANEL: CPT | Performed by: INTERNAL MEDICINE

## 2023-11-23 PROCEDURE — 82948 REAGENT STRIP/BLOOD GLUCOSE: CPT

## 2023-11-23 PROCEDURE — 99221 1ST HOSP IP/OBS SF/LOW 40: CPT | Performed by: PSYCHIATRY & NEUROLOGY

## 2023-11-23 RX ORDER — HYDROCODONE BITARTRATE AND ACETAMINOPHEN 5; 325 MG/1; MG/1
1 TABLET ORAL EVERY 4 HOURS PRN
Status: DISCONTINUED | OUTPATIENT
Start: 2023-11-23 | End: 2023-11-24 | Stop reason: HOSPADM

## 2023-11-23 RX ORDER — ACETAMINOPHEN 325 MG/1
650 TABLET ORAL EVERY 6 HOURS PRN
Status: DISCONTINUED | OUTPATIENT
Start: 2023-11-23 | End: 2023-11-24 | Stop reason: HOSPADM

## 2023-11-23 RX ADMIN — PANTOPRAZOLE SODIUM 40 MG: 40 TABLET, DELAYED RELEASE ORAL at 06:10

## 2023-11-23 RX ADMIN — Medication 10 ML: at 20:39

## 2023-11-23 RX ADMIN — INSULIN LISPRO 6 UNITS: 100 INJECTION, SOLUTION INTRAVENOUS; SUBCUTANEOUS at 20:37

## 2023-11-23 RX ADMIN — LISINOPRIL 2.5 MG: 2.5 TABLET ORAL at 20:38

## 2023-11-23 RX ADMIN — QUETIAPINE FUMARATE 25 MG: 25 TABLET, FILM COATED ORAL at 20:38

## 2023-11-23 RX ADMIN — LISINOPRIL 2.5 MG: 2.5 TABLET ORAL at 08:45

## 2023-11-23 RX ADMIN — INSULIN LISPRO 4 UNITS: 100 INJECTION, SOLUTION INTRAVENOUS; SUBCUTANEOUS at 11:31

## 2023-11-23 RX ADMIN — INSULIN DETEMIR 7 UNITS: 100 INJECTION, SOLUTION SUBCUTANEOUS at 14:58

## 2023-11-23 RX ADMIN — HYDROCODONE BITARTRATE AND ACETAMINOPHEN 1 TABLET: 5; 325 TABLET ORAL at 20:38

## 2023-11-23 RX ADMIN — HYDROCODONE BITARTRATE AND ACETAMINOPHEN 1 TABLET: 5; 325 TABLET ORAL at 14:27

## 2023-11-23 RX ADMIN — INSULIN LISPRO 4 UNITS: 100 INJECTION, SOLUTION INTRAVENOUS; SUBCUTANEOUS at 07:43

## 2023-11-23 NOTE — PROGRESS NOTES
"    [facility] Medicine Services  INPATIENT PROGRESS NOTE    Patient Name: Harmony Montoya  Date of Admission: 11/15/2023  Today's Date: 11/22/23  Length of Stay: 7  Primary Care Physician: Provider, No Known    Subjective     No acute events overnight.         Objective    Temp:  [97.6 °F (36.4 °C)-97.8 °F (36.6 °C)] 97.8 °F (36.6 °C)  Heart Rate:  [82-98] 98  Resp:  [16] 16  BP: (121-158)/(54-70) 121/67    General: No acute distress  HEENT: normocephalic, atraumatic  Neck: Supple  CV: RRR  Lung: Clear to auscultation bilaterally.  No wheeze, rales, or rhonchi noted.  Abdominal exam: Positive bowel sounds, nontender, non distended  Extremity: No edema noted  Neurological exam: AAO x3. Cranial nerve II to XII grossly intact  Skin exam: Dry and warm  Psychiatric exam: Calm, cooperative, and normal affect       Results Review:  I have reviewed the labs, radiology results, and diagnostic studies.    Laboratory Data:   Results from last 7 days   Lab Units 11/22/23  0903 11/19/23  0651 11/18/23  0655   WBC 10*3/mm3 11.86* 8.11 8.52   HEMOGLOBIN g/dL 10.5* 10.3* 10.0*   HEMATOCRIT % 34.7 33.0* 31.9*   PLATELETS 10*3/mm3 225 206 188        Results from last 7 days   Lab Units 11/22/23  0903 11/19/23  0651 11/18/23  0655   SODIUM mmol/L 132* 138 135*   POTASSIUM mmol/L 4.0 4.1 4.2   CHLORIDE mmol/L 94* 98 96*   CO2 mmol/L 21.0* 27.0 26.0   BUN mg/dL 48* 42* 27*   CREATININE mg/dL 4.39* 3.85* 2.99*   CALCIUM mg/dL 9.3 9.6 9.2   BILIRUBIN mg/dL 0.3  --   --    ALK PHOS U/L 124*  --   --    ALT (SGPT) U/L 10  --   --    AST (SGOT) U/L 21  --   --    GLUCOSE mg/dL 199* 236* 212*       Culture Data:   No results found for: \"BLOODCX\", \"URINECX\", \"WOUNDCX\", \"MRSACX\", \"RESPCX\", \"STOOLCX\"    Radiology Data:   Imaging Results (Last 24 Hours)       ** No results found for the last 24 hours. **            I have reviewed the patient's current medications.     Assessment/Plan   Assessment  Active [facility] " Problems    Diagnosis     **Ischemic rest pain of lower extremity     Acute encephalopathy     Hypertensive encephalopathy     End stage renal disease on dialysis     Type 2 diabetes mellitus, with long-term current use of insulin     Primary hypertension     Coronary artery disease involving native coronary artery of native heart without angina pectoris     Chronic GERD        Assessment and Plan:    Delirium and hypertensive encephalopathy > appears resolved  Off Heparin drip. At this time she is stable to transfer back to telemetry.      Ativan IV prn agitation  Vitals per protocol  Diet cardiac ADA  Up with assistance, fall precautions  Pain control > Tylenol 650 > Lortab 5 > Dilaudid 0.5 IV q4h prn  Heparin drip protocol D for renal failure > stopped by vascular.   Vascular surgery consult > plan for angiogram of LLE yesterday, but patient refused.      CP, elevated troponin  Cardiology consulted  Deferred stress testing     ESRD > Nephrology consult in place     DM > Hold Levemir  Check A1C, lipid panel  Humalog low dose sliding scale     HTN>Continue home medications Lisinopril     DVT prophylaxis > Patient currently fully anticoagulated      Da Alexander MD 11/22/23, 20:44 CST.                  Treatment Plan      Medical Decision Making  Number and Complexity of problems:   Differential Diagnosis:     Conditions and Status             MDM Data  External documents reviewed:   Cardiac tracing (EKG, telemetry) interpretation:   Radiology interpretation:   Labs reviewed:   Any tests that were considered but not ordered:      Decision rules/scores evaluated (example [id number], Wells, etc):      Discussed with:      Care Planning  Shared decision making:   Code status and discussions:     Disposition  Social Determinants of Health that impact treatment or disposition:   I expect the patient to be discharged to  in  days.     Electronically signed by Da Alexander MD, 11/22/23, 20:44 CST.

## 2023-11-23 NOTE — CONSULTS
NEUROLOGY CONSULT     DOS: 2023  NAME: Harmony Montoya   : 1950  PCP: Provider, No Known  CC: Stroke  Referring MD: No ref. provider found      Neurological Problem and Interval History:  73 y.o. female  with multiple cv risk factors and ESRD on HD and presents with chief complaint of altered mental status. Patient presented with left foot pain thought to be vascular in etiology. She did receive pain medications. She had missed HD  and had a make up session  and since that time was noted to have fluctuating mental status. She had some hypertension as well as some hyponatremia. She has a sitter who has noted waxing and waning mentation and occasional hallucination. When seen by this physician in consultation she is awe, and cooperative and answered all questions appropriately. She did, however, fall asleep during conversation but was easily aroused again. She has not been sleeping well given her pain.      Past Medical/Surgical Hx:  Past Medical History:   Diagnosis Date    Diabetes mellitus     Hypertension      History reviewed. No pertinent surgical history.    Review of Systems:        No fever, sweats or chills,  No neck pain, back pain   Left foot pain  No loss of hearing or tinnitus  No blurry or double vision  No rashes or edema  No chest pain or palpitations  No shortness of breath or wheezing  No diarrhea or constipation  No urinary incontinence or dysuria  No seizures or syncope  No depression or anxiety    Medications On Admission  Medications Prior to Admission   Medication Sig Dispense Refill Last Dose    Ascorbic Acid (Vitamin C) 500 MG capsule Take 2 capsules by mouth Daily.   11/15/2023    aspirin 81 MG chewable tablet Chew 1 tablet Daily.   2023    carvedilol (COREG) 25 MG tablet Take 1 tablet by mouth 2 (Two) Times a Day With Meals.   11/15/2023    clopidogrel (PLAVIX) 75 MG tablet Take 1 tablet by mouth Daily.   2023    diphenhydrAMINE (BENADRYL) 25 mg capsule  "Take 1 capsule by mouth Daily.   2023    insulin glargine (LANTUS, SEMGLEE) 100 UNIT/ML injection Inject 7 Units under the skin into the appropriate area as directed Daily.   11/15/2023    lisinopril (PRINIVIL,ZESTRIL) 20 MG tablet Take 0.5 tablets by mouth Daily.   11/15/2023    omeprazole (priLOSEC) 40 MG capsule Take 1 capsule by mouth Daily.   11/15/2023    vitamin D3 125 MCG (5000 UT) capsule capsule Take 1 capsule by mouth Daily.   11/15/2023    insulin regular (humuLIN R,novoLIN R) 100 UNIT/ML injection Inject 6 Units under the skin into the appropriate area as directed Daily Before Supper.       Polyvinyl Alcohol-Povidone PF (HYPOTEARS) 1.4-0.6 % ophthalmic solution Administer 1 drop to both eyes 4 (Four) Times a Day.          Allergies:  Allergies   Allergen Reactions    Statins Swelling       Social Hx:  Social History     Socioeconomic History    Marital status: Single   Tobacco Use    Smoking status: Former     Types: Cigarettes     Quit date: 2021     Years since quittin.5    Smokeless tobacco: Never   Vaping Use    Vaping Use: Never used   Substance and Sexual Activity    Alcohol use: Never    Drug use: Never       Family Hx:  History reviewed. No pertinent family history.    Review of Imaging (Interpretation of images not reports):  -HCT unremarkable    Laboratory Results:   Lab Results   Component Value Date    GLUCOSE 221 (H) 2023    CALCIUM 9.1 2023     (L) 2023    K 4.3 2023    CO2 24.0 2023    CL 95 (L) 2023    BUN 58 (H) 2023    CREATININE 5.44 (H) 2023    BCR 10.7 2023    ANIONGAP 14.0 2023     Lab Results   Component Value Date    WBC 11.87 (H) 2023    HGB 9.1 (L) 2023    HCT 29.9 (L) 2023    .7 (H) 2023     2023     No results found for: \"CHOL\"  No results found for: \"HDL\"  No results found for: \"LDL\"  No results found for: \"TRIG\"  No results found for: \"HGBA1C\"  Lab " "Results   Component Value Date    INR 1.10 (H) 11/15/2023    PROTIME 14.3 11/15/2023         Physical Examination:   /55 (BP Location: Right arm, Patient Position: Lying)   Pulse 82   Temp 98 °F (36.7 °C) (Axillary)   Resp 16   Ht 160 cm (63\")   Wt 59.4 kg (131 lb)   SpO2 99%   BMI 23.21 kg/m²     Neurological examination:  Higher Integrative  Function: Oriented to time, place and person.   CN II: Pupils are equal, round, and reactive to light. Blinks to visual threat and counts fingers in all fields  CN III IV VI: Extraocular movements are full without nystagmus.   CN V: Normal facial sensation   CN VII: Facial movements are symmetric. No labial dysarthria  CN VIII:   Auditory acuity is normal.  CN IX & X:   No palatal or pharnygeal dysarthria.  CN XI: Turns head without abnormality.   CN XII:   The tongue is midline.  No lingual dysarthria.   Motor: Normal muscle strength, bulk and tone in upper and lower extremities.  No fasciculations, rigidity, spasticity, or abnormal movements.  Reflexes: Symmetrically reduced in the upper and lower extremities. Plantar responses are flexor.  Sensation: Normal to light touch throughout, decreased in stocking glove pattern  Station and Gait: Deferred for bed rest    Coordination: Finger-to-nose test shows no dysmetria.          Diagnoses / Discussion:   73 y.o. female  with multiple cv risk factors and ESRD on HD and presents with chief complaint of altered mental status in the setting of acute pain, recent pain medications, hospitalization, and hyponatremia.This likely represents a toxic-metabolic encephalopathy with a component of delirium.    Neurological exam is non-focal    Mental status is waxing and waning    HCT unremarkable    B12, folate, TSH, ammonia    Limit sedating medications and narcotics    Delirium precautions    No indication for further neurological diagnostic testing at this time, but will follow clinically at this time.     I have discussed " the above with the patient and family.  Time spent with patient: 60min    MDM  Reviewed: previous chart, nursing note and vitals  Reviewed previous: labs and CT scan  Interpretation: labs and CT scan  Total time providing critical care: 30-74 minutes. This excludes time spent performing separately reportable procedures and services.  Consults: neurology         Vandana Darden MD   Neurology

## 2023-11-23 NOTE — PROGRESS NOTES
LOS: 8 days   Patient Care Team:  Provider, No Known as PCP - General    Chief Complaint: Transient chest pain     Subjective    Harmony Montoya is a 73 y.o. female who is being seen in follow-up.    Able to communicate  Denies any chest pain  No palpitation  No presyncope  No syncope  No orthopnea  No paroxysmal nocturnal dyspnea  No bleeding issues  No falls    Relevant testing      Nuclear stress test shows large anterior anteroapical apical lateral and apical septal infarction without significant ischemia  Left ventricular ejection fraction of 45% with apical akinesis    Telemetry: no malignant arrhythmia. No significant pauses.    Review of Systems   Constitutional: No chills   Has fatigue   No fever.   HENT: Negative.    Eyes: Negative.    Respiratory: Negative for cough,   No chest wall soreness,   Shortness of breath,   no wheezing, no stridor.    Cardiovascular: As above  Gastrointestinal: Negative for abdominal distention,  No abdominal pain,   No blood in stool,   No constipation,   No diarrhea,   No nausea   No vomiting.   Endocrine: Negative.    Genitourinary: Negative for difficulty urinating, dysuria, flank pain and hematuria.   Musculoskeletal: Negative.    Skin: Negative for rash and wound.   Allergic/Immunologic: Negative.    Neurological: Negative for dizziness, syncope, weakness,   No light-headedness  No  headaches.   Hematological: Does not bruise/bleed easily.   Psychiatric/Behavioral: Negative for agitation or behavioral problems,   No confusion,   the patient is  nervous/anxious.       History:   Past Medical History:   Diagnosis Date    Diabetes mellitus     Hypertension      History reviewed. No pertinent surgical history.  Social History     Socioeconomic History    Marital status: Single   Tobacco Use    Smoking status: Former     Types: Cigarettes     Quit date: 2021     Years since quittin.5    Smokeless tobacco: Never   Vaping Use    Vaping Use: Never used   Substance and  "Sexual Activity    Alcohol use: Never    Drug use: Never     History reviewed. No pertinent family history.    Labs:  WBC WBC   Date Value Ref Range Status   11/23/2023 11.87 (H) 3.40 - 10.80 10*3/mm3 Final   11/22/2023 11.86 (H) 3.40 - 10.80 10*3/mm3 Final        HGB Hemoglobin   Date Value Ref Range Status   11/23/2023 9.1 (L) 12.0 - 15.9 g/dL Final   11/22/2023 10.5 (L) 12.0 - 15.9 g/dL Final        HCT Hematocrit   Date Value Ref Range Status   11/23/2023 29.9 (L) 34.0 - 46.6 % Final   11/22/2023 34.7 34.0 - 46.6 % Final        Platelets Platelets   Date Value Ref Range Status   11/23/2023 237 140 - 450 10*3/mm3 Final   11/22/2023 225 140 - 450 10*3/mm3 Final        MCV MCV   Date Value Ref Range Status   11/23/2023 101.7 (H) 79.0 - 97.0 fL Final   11/22/2023 102.1 (H) 79.0 - 97.0 fL Final          Results from last 7 days   Lab Units 11/23/23  0454 11/22/23  0903 11/19/23  0651   SODIUM mmol/L 133* 132* 138   POTASSIUM mmol/L 4.3 4.0 4.1   CHLORIDE mmol/L 95* 94* 98   CO2 mmol/L 24.0 21.0* 27.0   BUN mg/dL 58* 48* 42*   CREATININE mg/dL 5.44* 4.39* 3.85*   CALCIUM mg/dL 9.1 9.3 9.6   BILIRUBIN mg/dL 0.3 0.3  --    ALK PHOS U/L 119* 124*  --    ALT (SGPT) U/L 10 10  --    AST (SGOT) U/L 16 21  --    GLUCOSE mg/dL 221* 199* 236*     Lab Results   Component Value Date    TROPONINT 129 (C) 11/20/2023     PT/INR:  No results found for: \"PROTIME\"/No results found for: \"INR\"    Imaging Results (Last 72 Hours)       ** No results found for the last 72 hours. **            Objective     Allergies   Allergen Reactions    Statins Swelling       Medication Review: Performed  Current Facility-Administered Medications   Medication Dose Route Frequency Provider Last Rate Last Admin    acetaminophen (TYLENOL) tablet 650 mg  650 mg Oral Q6H PRN Salvatore Miller MD        amLODIPine (NORVASC) tablet 5 mg  5 mg Oral Q24H Salvatore Miller MD   5 mg at 11/22/23 1330    aspirin chewable tablet 81 mg  81 mg Oral Daily " Salvatore Miller MD   81 mg at 11/22/23 1330    sennosides-docusate (PERICOLACE) 8.6-50 MG per tablet 2 tablet  2 tablet Oral BID Salvatore Miller MD   2 tablet at 11/22/23 2126    And    polyethylene glycol (MIRALAX) packet 17 g  17 g Oral Daily PRN Salvatore Miller MD        And    bisacodyl (DULCOLAX) EC tablet 5 mg  5 mg Oral Daily PRN Salvatore Miller MD        And    bisacodyl (DULCOLAX) suppository 10 mg  10 mg Rectal Daily PRN Salvatore Miller MD        cloNIDine (CATAPRES) tablet 0.1 mg  0.1 mg Oral Q6H PRN Salvatore Miller MD   0.1 mg at 11/20/23 1546    dextrose (D50W) (25 g/50 mL) IV injection 25 g  25 g Intravenous Q15 Min PRN Salvatore Miller MD        dextrose (GLUTOSE) oral gel 15 g  15 g Oral Q15 Min PRN Salvatore Miller MD        glucagon (GLUCAGEN) injection 1 mg  1 mg Intramuscular Q15 Min PRN Salvatore Miller MD        heparin (porcine) injection 1,700 Units  30 Units/kg Intravenous PRN Salvatore Miller MD        heparin (porcine) injection 3,400 Units  60 Units/kg Intravenous PRN Salvatore Miller MD   3,400 Units at 11/20/23 0055    hydrALAZINE (APRESOLINE) injection 10 mg  10 mg Intravenous Q6H PRN Salvatore Miller MD   10 mg at 11/20/23 0033    HYDROcodone-acetaminophen (NORCO) 5-325 MG per tablet 1 tablet  1 tablet Oral Q4H PRN Salvatore Miller MD   1 tablet at 11/22/23 2126    HYDROmorphone (DILAUDID) injection 0.5 mg  0.5 mg Intravenous Q4H PRN Salvatore Miller MD   0.5 mg at 11/22/23 1347    Insulin Lispro (humaLOG) injection 2-9 Units  2-9 Units Subcutaneous 4x Daily AC & at Bedtime Salvatore Miller MD   4 Units at 11/23/23 0743    isosorbide mononitrate (IMDUR) 24 hr tablet 30 mg  30 mg Oral Q24H Salvatore Miller MD   30 mg at 11/22/23 1330    labetalol (NORMODYNE,TRANDATE) injection 10 mg  10 mg Intravenous Q4H PRN Salvatore Miller MD   10 mg at  "11/20/23 0049    lisinopril (PRINIVIL,ZESTRIL) tablet 2.5 mg  2.5 mg Oral BID Salvatore Miller MD   2.5 mg at 11/22/23 2126    naloxone (NARCAN) injection 0.4 mg  0.4 mg Intravenous Q5 Min PRN Salvatore Miller MD        nitroglycerin (NITROSTAT) SL tablet 0.4 mg  0.4 mg Sublingual Q5 Min PRN Salvatore Miller MD   0.4 mg at 11/20/23 0437    ondansetron (ZOFRAN) injection 4 mg  4 mg Intravenous Q6H PRN Salvatore Miller MD   4 mg at 11/20/23 0126    pantoprazole (PROTONIX) EC tablet 40 mg  40 mg Oral Q AM Salvatore Miller MD   40 mg at 11/23/23 0610    QUEtiapine (SEROquel) tablet 25 mg  25 mg Oral Nightly Da Alexander MD   25 mg at 11/22/23 2126    sodium chloride 0.9 % flush 10 mL  10 mL Intravenous Q12H Salvatore Miller MD   10 mL at 11/22/23 2150    sodium chloride 0.9 % flush 10 mL  10 mL Intravenous PRN Salvatore Miller MD           Vital Sign Min/Max for last 24 hours  Temp  Min: 97.8 °F (36.6 °C)  Max: 98.7 °F (37.1 °C)   BP  Min: 121/67  Max: 158/67   Pulse  Min: 82  Max: 101   Resp  Min: 15  Max: 16   SpO2  Min: 94 %  Max: 100 %   No data recorded   Weight  Min: 59.4 kg (131 lb)  Max: 59.4 kg (131 lb)     Flowsheet Rows      Flowsheet Row First Filed Value   Admission Height 160 cm (63\") Documented at 11/15/2023 1045   Admission Weight 56.7 kg (125 lb) Documented at 11/15/2023 1045            Results for orders placed during the hospital encounter of 11/15/23    Adult Transthoracic Echo Complete W/ Cont if Necessary Per Protocol    Interpretation Summary    Left ventricular systolic function is low normal. Left ventricular ejection fraction appears to be 41 - 45%.    The following left ventricular wall segments are akinetic: apical anterior, apical lateral, apical inferior, apical septal, apex and mid anteroseptal.    Left ventricular diastolic function was indeterminate.    Estimated right ventricular systolic pressure from tricuspid regurgitation " is normal (<35 mmHg).      Physical Exam:    General Appearance: Awake, alert, in no acute distress  Eyes: Pupils equal and reactive    Ears: Appear intact with no abnormalities noted  Nose: Nares normal, no drainage  Neck: supple, trachea midline, no carotid bruit and no JVD  Back: no kyphosis present,    Lungs: respirations regular, respirations even and respirations unlabored  Heart: normal S1, S2, no significant murmurs   No gallops or rubs  no rub and no click  Abdomen: normal bowel sounds, no tenderness   Skin: no bleeding, bruising or rash  Extremities: no cyanosis  Psychiatric/Behavioral: Negative for agitation, behavioral problems, confusion, the patient does  appear to be nervous/anxious.       Results Review:   I reviewed the patient's new clinical results.  I reviewed the patient's new imaging results and agree with the interpretation.  I reviewed the patient's other test results and agree with the interpretation  I personally viewed and interpreted the patient's EKG/Telemetry data    Discussed with patient  Updated patient regarding any new or relevant abnormalities on review of records or any new findings on physical exam.   Mentioned to patient about purpose of visit and desirable health short and long term goals and objectives.     Reviewed available prior notes, consults, prior visits, laboratory findings, radiology and cardiology relevant reports.   Updated chart as applicable.   I have reviewed the patient's medical history in detail and updated the computerized patient record as relevant.          Assessment & Plan       Ischemic rest pain of lower extremity    End stage renal disease on dialysis    Type 2 diabetes mellitus, with long-term current use of insulin    Primary hypertension    Coronary artery disease involving native coronary artery of native heart without angina pectoris    Chronic GERD    Acute encephalopathy    Hypertensive encephalopathy      Plan    Lexiscan Cardiolite stress  test shows infarction without ischemia  No additional cardiac testing currently required  Follow-up with cardiology as outpatient  Care plan discussed with her    Deep vein thrombosis prophylaxis/precautions  Appropriate diet, fluid, sodium, caffeine, stimulants intake   Questions were encouraged, asked and answered to the patient's  understanding and satisfaction.  Compliance to diet and medications       Christian Donovan MD  11/23/23  08:11 CST    EMR Dragon/Transcription was used to dictate part of this note

## 2023-11-23 NOTE — PROGRESS NOTES
"    [facility] Medicine Services  INPATIENT PROGRESS NOTE    Patient Name: Harmony Montoya  Date of Admission: 11/15/2023  Today's Date: 11/23/23  Length of Stay: 8  Primary Care Physician: Provider, No Known    Subjective     No acute events overnight.         Objective    Temp:  [97.6 °F (36.4 °C)-98.7 °F (37.1 °C)] 98.2 °F (36.8 °C)  Heart Rate:  [] 99  Resp:  [15-18] 18  BP: (121-158)/(62-97) 150/72    General: No acute distress  HEENT: normocephalic, atraumatic  Neck: Supple  CV: RRR  Lung: Clear to auscultation bilaterally.  No wheeze, rales, or rhonchi noted.  Abdominal exam: Positive bowel sounds, nontender, non distended  Extremity: No edema noted  Neurological exam: AAO x3. Cranial nerve II to XII grossly intact  Skin exam: Dry and warm  Psychiatric exam: Calm, cooperative, and normal affect       Results Review:  I have reviewed the labs, radiology results, and diagnostic studies.    Laboratory Data:   Results from last 7 days   Lab Units 11/23/23  0454 11/22/23  0903 11/19/23  0651   WBC 10*3/mm3 11.87* 11.86* 8.11   HEMOGLOBIN g/dL 9.1* 10.5* 10.3*   HEMATOCRIT % 29.9* 34.7 33.0*   PLATELETS 10*3/mm3 237 225 206        Results from last 7 days   Lab Units 11/23/23  0454 11/22/23  0903 11/19/23  0651   SODIUM mmol/L 133* 132* 138   POTASSIUM mmol/L 4.3 4.0 4.1   CHLORIDE mmol/L 95* 94* 98   CO2 mmol/L 24.0 21.0* 27.0   BUN mg/dL 58* 48* 42*   CREATININE mg/dL 5.44* 4.39* 3.85*   CALCIUM mg/dL 9.1 9.3 9.6   BILIRUBIN mg/dL 0.3 0.3  --    ALK PHOS U/L 119* 124*  --    ALT (SGPT) U/L 10 10  --    AST (SGOT) U/L 16 21  --    GLUCOSE mg/dL 221* 199* 236*       Culture Data:   No results found for: \"BLOODCX\", \"URINECX\", \"WOUNDCX\", \"MRSACX\", \"RESPCX\", \"STOOLCX\"    Radiology Data:   Imaging Results (Last 24 Hours)       ** No results found for the last 24 hours. **            I have reviewed the patient's current medications.     Assessment/Plan   Assessment  Active Hospital " Problems    Diagnosis     **Ischemic rest pain of lower extremity     Acute encephalopathy     Hypertensive encephalopathy     End stage renal disease on dialysis     Type 2 diabetes mellitus, with long-term current use of insulin     Primary hypertension     Coronary artery disease involving native coronary artery of native heart without angina pectoris     Chronic GERD        Assessment and Plan:    Delirium and hypertensive encephalopathy > appears resolved  Off Heparin drip. At this time she is stable to transfer back to telemetry.      Ativan IV prn agitation  Vitals per protocol  Diet cardiac ADA  Up with assistance, fall precautions  Pain control > Tylenol 650 > Lortab 5 > Dilaudid 0.5 IV q4h prn  Heparin drip protocol D for renal failure > stopped by vascular.   Vascular surgery consult > plan for angiogram of LLE yesterday, but patient refused.      CP, elevated troponin  Cardiology consulted  Deferred stress testing     ESRD > Nephrology consult in place     DM > Hold Levemir  Check A1C, lipid panel  Humalog low dose sliding scale     HTN>Continue home medications Lisinopril     DVT prophylaxis > Patient currently fully anticoagulated    Dispo: pt has had fluctuating consciousness since admission, now it is affecting dialysis since she refused yesterday, CT head pending, Neurology consult pending.      Da Alexander MD 11/23/23, 11:36 CST.                  Treatment Plan      Medical Decision Making  Number and Complexity of problems:   Differential Diagnosis:     Conditions and Status             MDM Data  External documents reviewed:   Cardiac tracing (EKG, telemetry) interpretation:   Radiology interpretation:   Labs reviewed:   Any tests that were considered but not ordered:      Decision rules/scores evaluated (example [id number], Wells, etc):      Discussed with:      Care Planning  Shared decision making:   Code status and discussions:     Disposition  Social Determinants of Health that impact  treatment or disposition:   I expect the patient to be discharged to  in  days.     Electronically signed by Da Alexander MD, 11/23/23, 11:36 CST.

## 2023-11-23 NOTE — PLAN OF CARE
Goal Outcome Evaluation:  Plan of Care Reviewed With: patient        Progress: no change  Outcome Evaluation: alert, disoriented to time and situation, c/o left foot pain, prn pain meds given with relief, removed dressing to foot, replaced dressing this am, up with assistance to BSC, voiding, Dialysis Mon. Wed. Fri, refused dialysis yesterday, refused pericolace and protonix, drowsy this am but has been awake most of the night, bed alarm set, safety maintained

## 2023-11-23 NOTE — THERAPY TREATMENT NOTE
Acute Care - Physical Therapy Treatment Note  [facility]     Patient Name: Harmony Montoya  : 1950  MRN: [id number]  Today's Date: 2023      Visit Dx:     ICD-10-CM ICD-9-CM   1. End stage renal disease on dialysis  N18.6 585.6    Z99.2 V45.11   2. Arterial occlusion, lower extremity  I70.209 444.22   3. Intractable pain  R52 780.96   4. Ischemic rest pain of lower extremity  M79.606 729.5    I99.8 459.9   5. Impaired mobility [Z74.09]  Z74.09 799.89     Patient Active Problem List   Diagnosis    Pleural effusion    End stage renal disease on dialysis    Ischemic rest pain of lower extremity    Type 2 diabetes mellitus, with long-term current use of insulin    Primary hypertension    Coronary artery disease involving native coronary artery of native heart without angina pectoris    Chronic GERD    Acute encephalopathy    Hypertensive encephalopathy     Past Medical History:   Diagnosis Date    Diabetes mellitus     Hypertension      History reviewed. No pertinent surgical history.  PT Assessment (last 12 hours)       PT Evaluation and Treatment       Row Name 23 1116          Physical Therapy Time and Intention    Document Type therapy note (daily note)  -TB     Mode of Treatment physical therapy  -TB     Comment Pt very drowsy and not speaking much  -TB       Row Name 23 1116          General Information    Existing Precautions/Restrictions fall  -TB       Row Name 23 1116          Pain    Pre/Posttreatment Pain Comment Pt holding RLE and c/o pain with any touch to R foot/shin  -TB       Row Name 23 1116          Bed Mobility    Bed Mobility scooting/bridging;supine-sit;sit-supine  -TB     Scooting/Bridging Appling (Bed Mobility) dependent (less than 25% patient effort)  -TB     Supine-Sit [place] (Bed Mobility) maximum assist (25% patient effort);verbal cues  -TB     Sit-Supine App[place] (Bed Mobility) maximum assist (25% patient effort);verbal cues  -TB       Row  Name 11/23/23 1116          Transfers    Transfers sit-stand transfer;stand-sit transfer;bed-chair transfer;chair-bed transfer  -TB     Comment, (Transfers) Stood x3 times  -TB       Row Name 11/23/23 1116          Bed-Chair Transfer    Bed-Chair Miller (Transfers) dependent (less than 25% patient effort);2 person assist  -TB       Row Name 11/23/23 1116          Chair-Bed Transfer    Chair-Bed Miller (Transfers) dependent (less than 25% patient effort);2 person assist  -TB       Row Name 11/23/23 1116          Sit-Stand Transfer    Sit-Stand Miller (Transfers) dependent (less than 25% patient effort);2 person assist  -TB       Row Name 11/23/23 1116          Stand-Sit Transfer    Stand-Sit Miller (Transfers) dependent (less than 25% patient effort);2 person assist  -TB       Row Name             Wound 11/21/23 0942 Left anterior fourth toe    Wound - Properties Group Placement Date: 11/21/23  -MG Placement Time: 0942  -MG Present on Original Admission: Y  -MG Side: Left  -MG Orientation: anterior  -MG Location: fourth toe  -MG    Retired Wound - Properties Group Placement Date: 11/21/23  -MG Placement Time: 0942  -MG Present on Original Admission: Y  -MG Side: Left  -MG Orientation: anterior  -MG Location: fourth toe  -MG    Retired Wound - Properties Group Date first assessed: 11/21/23  -MG Time first assessed: 0942  -MG Present on Original Admission: Y  -MG Side: Left  -MG Location: fourth toe  -MG      Row Name 11/23/23 1116          Positioning and Restraints    Pre-Treatment Position in bed  -TB     Post Treatment Position bed  -TB     In Bed side lying right;call light within reach;encouraged to call for assist;exit alarm on;patient within staff view  Sitter/all rails  -TB               User Key  (r) = Recorded By, (t) = Taken By, (c) = Cosigned By      Initials Name Provider Type    TB Yasmin Cortes, PTA Physical Therapist Assistant    Carlos Hawkins, RN Registered Nurse                     Physical Therapy Education       Title: PT OT SLP Therapies (In Progress)       Topic: Physical Therapy (In Progress)       Point: Mobility training (In Progress)       Learning Progress Summary             Patient Acceptance, E,D, NR by JULIA at 11/21/2023 1159    Comment: Safety with sit to stand to sit transfers    Acceptance, E, NR by SILVINA at 11/20/2023 1300    Comment: benefits of activity, progression of PT                         Point: Home exercise program (Not Started)       Learner Progress:  Not documented in this visit.              Point: Body mechanics (Not Started)       Learner Progress:  Not documented in this visit.              Point: Precautions (Not Started)       Learner Progress:  Not documented in this visit.                              User Key       Initials Effective Dates Name Provider Type Discipline    SILVINA 02/03/23 -  Bashir Burns, PT DPT Physical Therapist PT    JULIA 02/03/23 -  Ivon Briceno PTA Physical Therapist Assistant PT                  PT Recommendation and Plan         Outcome Measures       Row Name 11/23/23 1100 11/22/23 1500 11/21/23 1100       How much help from another person do you currently need...    Turning from your back to your side while in flat bed without using bedrails? 1  -TB 4  -KJ 4  -JULIA    Moving from lying on back to sitting on the side of a flat bed without bedrails? 1  -TB 4  -KJ 4  -JULIA    Moving to and from a bed to a chair (including a wheelchair)? 1  -TB 3  -KJ 3  -JULIA    Standing up from a chair using your arms (e.g., wheelchair, bedside chair)? 1  -TB 2  -KJ 3  -JULIA    Climbing 3-5 steps with a railing? 1  -TB 1  -KJ 1  -JULIA    To walk in hospital room? 1  -TB 2  -KJ 2  -JULIA    AM-PAC 6 Clicks Score (PT) 6  -TB 16  -KJ 17  -JULIA    Highest Level of Mobility Goal 2 --> Bed activities/dependent transfer  -TB 5 --> Static standing  -KJ 5 --> Static standing  -JULIA       Functional Assessment    Outcome Measure Options AM-PAC 6 Clicks  Basic Mobility (PT)  -TB AM-PAC 6 Clicks Basic Mobility (PT)  -KJ --              User Key  (r) = Recorded By, (t) = Taken By, (c) = Cosigned By      Initials Name Provider Type    KJ Alyse Tomlinson, PTA Physical Therapist Assistant    JULIA Ivon Briceno, PTA Physical Therapist Assistant    Yasmin Hunt, KATE Physical Therapist Assistant                     Time Calculation:    PT Charges       Row Name 11/23/23 1151             Time Calculation    Start Time 1116  -TB      Stop Time 1140  -TB      Time Calculation (min) 24 min  -TB      PT Received On 11/23/23  -TB         Time Calculation- PT    Total Timed Code Minutes- PT 24 minute(s)  -TB                User Key  (r) = Recorded By, (t) = Taken By, (c) = Cosigned By      Initials Name Provider Type    Yasmin Hunt, KATE Physical Therapist Assistant                  Therapy Charges for Today       Code Description Service Date Service Provider Modifiers Qty    97003304784 HC PT THERAPEUTIC ACT EA 15 MIN 11/23/2023 Yasmin Cortes PTA GP 2            PT G-Codes  Outcome Measure Options: AM-PAC 6 Clicks Basic Mobility (PT)  AM-PAC 6 Clicks Score (PT): 6    Yasmin Cortes PTA  11/23/2023

## 2023-11-23 NOTE — PLAN OF CARE
Goal Outcome Evaluation:  Plan of Care Reviewed With: patient        Progress: no change   Pt alert and oriented x2 this morning with bouts of confusion. Pt A/O x2 this evening. VSS. Pt c/o Left toe pain. PRN medications given with moderte relief of symptoms. AMAYA. PPP. No orders for VTE. , room air. Tele, NSR.  Tolerating prescribed diet. Wound care done per order this evening, pt refused this morning. Pt on dialysis, anuric. Up x 1.  Last BM 11/21. BS monitored, pt will try to refuse finger sticks until you give her the reason why. Sitter in room. Bed alarm set. Call light within reach. Safety maintained. No changes this shift.

## 2023-11-23 NOTE — PROGRESS NOTES
Nephrology ([facility]) Progress Note      Patient:  Harmony Montoya  YOB: 1950  Date of Service: 11/23/2023  MRN: [id number]   Acct: [id number]   Primary Care Physician: Provider, No Known  Advance Directive:   Code Status and Medical Interventions:   Ordered at: 11/15/23 3955     Code Status (Patient has no pulse and is not breathing):    CPR (Attempt to Resuscitate)     Medical Interventions (Patient has pulse or is breathing):    Full Support     Admit Date: 11/15/2023       Hospital Day: 8  Referring Provider: No ref. provider found      Patient personally seen and examined.  Complete chart including Consults, Notes, Operative Reports, Labs, Cardiology, and Radiology studies reviewed as able.    Chief complaint: Abnormal labs.    Subjective:  Harmony Montoya is a 73 y.o. female for whom we were consulted for evaluation and treatment of end stage renal disease. Maintenance hemodialysis Monday Wednesday Friday at [facility]. Missed HD on 11/15. History of type 2 diabetes and hypertension. Presented with pain in left foot. Admitted for vascular evaluation. Underwent make-up dialysis on 11/16. Tolerated treatment well but later developed some confusion and was moved to CCU. Tolerated HD on 11/18 &11/20. Plans for angiogram have now been put on hold per Dr Villagomez.  Moved to medical floor on 11/21.    This morning patient feels well.  She has refused dialysis yesterday and and has been confused.  She denies any shortness of breath or swelling.  Allergies:  Statins    Home Meds:  Medications Prior to Admission   Medication Sig Dispense Refill Last Dose    Ascorbic Acid (Vitamin C) 500 MG capsule Take 2 capsules by mouth Daily.   11/15/2023    aspirin 81 MG chewable tablet Chew 1 tablet Daily.   11/14/2023    carvedilol (COREG) 25 MG tablet Take 1 tablet by mouth 2 (Two) Times a Day With Meals.   11/15/2023    clopidogrel (PLAVIX) 75 MG tablet Take 1 tablet by mouth Daily.    11/14/2023    diphenhydrAMINE (BENADRYL) 25 mg capsule Take 1 capsule by mouth Daily.   11/14/2023    insulin glargine (LANTUS, SEMGLEE) 100 UNIT/ML injection Inject 7 Units under the skin into the appropriate area as directed Daily.   11/15/2023    lisinopril (PRINIVIL,ZESTRIL) 20 MG tablet Take 0.5 tablets by mouth Daily.   11/15/2023    omeprazole (priLOSEC) 40 MG capsule Take 1 capsule by mouth Daily.   11/15/2023    vitamin D3 125 MCG (5000 UT) capsule capsule Take 1 capsule by mouth Daily.   11/15/2023    insulin regular (humuLIN R,novoLIN R) 100 UNIT/ML injection Inject 6 Units under the skin into the appropriate area as directed Daily Before Supper.       Polyvinyl Alcohol-Povidone PF (HYPOTEARS) 1.4-0.6 % ophthalmic solution Administer 1 drop to both eyes 4 (Four) Times a Day.          Medicines:  Current Facility-Administered Medications   Medication Dose Route Frequency Provider Last Rate Last Admin    acetaminophen (TYLENOL) tablet 650 mg  650 mg Oral Q6H PRN Salvatore Miller MD        amLODIPine (NORVASC) tablet 5 mg  5 mg Oral Q24H Salvatore Miller MD   5 mg at 11/22/23 1330    aspirin chewable tablet 81 mg  81 mg Oral Daily Salvatore Miller MD   81 mg at 11/22/23 1330    sennosides-docusate (PERICOLACE) 8.6-50 MG per tablet 2 tablet  2 tablet Oral BID Salvatore Miller MD   2 tablet at 11/22/23 2126    And    polyethylene glycol (MIRALAX) packet 17 g  17 g Oral Daily PRN Salvatore Miller MD        And    bisacodyl (DULCOLAX) EC tablet 5 mg  5 mg Oral Daily PRN Salvatore Miller MD        And    bisacodyl (DULCOLAX) suppository 10 mg  10 mg Rectal Daily PRN Salvatore Miller MD        cloNIDine (CATAPRES) tablet 0.1 mg  0.1 mg Oral Q6H PRN Salvatore Miller MD   0.1 mg at 11/20/23 1546    dextrose (D50W) (25 g/50 mL) IV injection 25 g  25 g Intravenous Q15 Min PRN Salvatore Miller MD        dextrose (GLUTOSE) oral gel 15 g  15 g Oral  Q15 Min PRN Salvatore Miller MD        glucagon (GLUCAGEN) injection 1 mg  1 mg Intramuscular Q15 Min PRN Salvatore Miller MD        heparin (porcine) injection 1,700 Units  30 Units/kg Intravenous PRN Salvatore Miller MD        heparin (porcine) injection 3,400 Units  60 Units/kg Intravenous PRN Salvatore Miller MD   3,400 Units at 11/20/23 0055    hydrALAZINE (APRESOLINE) injection 10 mg  10 mg Intravenous Q6H PRN Salvatore Miller MD   10 mg at 11/20/23 0033    HYDROcodone-acetaminophen (NORCO) 5-325 MG per tablet 1 tablet  1 tablet Oral Q4H PRN Salvatore Miller MD   1 tablet at 11/22/23 2126    HYDROmorphone (DILAUDID) injection 0.5 mg  0.5 mg Intravenous Q4H PRN Salvatore Miller MD   0.5 mg at 11/22/23 1347    Insulin Lispro (humaLOG) injection 2-9 Units  2-9 Units Subcutaneous 4x Daily AC & at Bedtime Salvatore Miller MD   4 Units at 11/23/23 0743    isosorbide mononitrate (IMDUR) 24 hr tablet 30 mg  30 mg Oral Q24H Salvatore Miller MD   30 mg at 11/22/23 1330    labetalol (NORMODYNE,TRANDATE) injection 10 mg  10 mg Intravenous Q4H PRN Salvatore Miller MD   10 mg at 11/20/23 0049    lisinopril (PRINIVIL,ZESTRIL) tablet 2.5 mg  2.5 mg Oral BID Salvatore Miller MD   2.5 mg at 11/23/23 0845    naloxone (NARCAN) injection 0.4 mg  0.4 mg Intravenous Q5 Min PRN Salvatore Miller MD        nitroglycerin (NITROSTAT) SL tablet 0.4 mg  0.4 mg Sublingual Q5 Min PRN Salvatore Miller MD   0.4 mg at 11/20/23 0437    ondansetron (ZOFRAN) injection 4 mg  4 mg Intravenous Q6H PRN Salvatore Miller MD   4 mg at 11/20/23 0126    pantoprazole (PROTONIX) EC tablet 40 mg  40 mg Oral Q AM Salvatore Miller MD   40 mg at 11/23/23 0610    QUEtiapine (SEROquel) tablet 25 mg  25 mg Oral Nightly Da Alexander MD   25 mg at 11/22/23 2122    sodium chloride 0.9 % flush 10 mL  10 mL Intravenous Q12H Salvatore Miller  "MD Kishore   10 mL at 23 2150    sodium chloride 0.9 % flush 10 mL  10 mL Intravenous PRN Salvatore Miller MD           Past Medical History:  Past Medical History:   Diagnosis Date    Diabetes mellitus     Hypertension        Past Surgical History:  History reviewed. No pertinent surgical history.    Family History  History reviewed. No pertinent family history.    Social History  Social History     Socioeconomic History    Marital status: Single   Tobacco Use    Smoking status: Former     Types: Cigarettes     Quit date: 2021     Years since quittin.5    Smokeless tobacco: Never   Vaping Use    Vaping Use: Never used   Substance and Sexual Activity    Alcohol use: Never    Drug use: Never       Review of Systems:  History obtained from chart review and the patient  General ROS: No fever or chills  Respiratory ROS: No cough, shortness of breath, wheezing  Cardiovascular ROS: No chest pain or palpitations  Gastrointestinal ROS: No abdominal pain or melena  Genito-Urinary ROS: No dysuria or hematuria  Psych ROS: No anxiety and depression  14 point ROS reviewed with the patient and negative except as noted above and in the HPI unless unable to obtain.    Objective:  Patient Vitals for the past 24 hrs:   BP Temp Temp src Pulse Resp SpO2 Height Weight   23 0846 154/97 97.6 °F (36.4 °C) Oral 72 16 98 % -- --   23 0310 145/77 98.2 °F (36.8 °C) Oral 98 15 100 % -- --   23 0037 142/62 98.5 °F (36.9 °C) Oral 101 15 98 % -- --   23 2052 134/66 98.7 °F (37.1 °C) Oral 89 16 100 % -- --   23 1645 121/67 97.8 °F (36.6 °C) Oral 98 16 94 % -- --   23 1211 158/67 -- -- 93 16 94 % -- --   23 1037 139/54 -- -- 82 -- -- 160 cm (63\") 59.4 kg (131 lb)     No intake or output data in the 24 hours ending 23 0957    General: awake/alert   HEENT: Normocephalic atraumatic head  Neck: Supple with no JVD or carotid bruits.  Chest:  clear to auscultation bilaterally without " respiratory distress  CVS: regular rate and rhythm  Abdominal: soft, nontender, positive bowel sounds  Extremities: no cyanosis or edema  Skin: warm and dry without rash      Labs:  Results from last 7 days   Lab Units 11/23/23  0454 11/22/23  0903 11/19/23  0651   WBC 10*3/mm3 11.87* 11.86* 8.11   HEMOGLOBIN g/dL 9.1* 10.5* 10.3*   HEMATOCRIT % 29.9* 34.7 33.0*   PLATELETS 10*3/mm3 237 225 206         Results from last 7 days   Lab Units 11/23/23  0454 11/22/23  0903 11/19/23  0651   SODIUM mmol/L 133* 132* 138   POTASSIUM mmol/L 4.3 4.0 4.1   CHLORIDE mmol/L 95* 94* 98   CO2 mmol/L 24.0 21.0* 27.0   BUN mg/dL 58* 48* 42*   CREATININE mg/dL 5.44* 4.39* 3.85*   CALCIUM mg/dL 9.1 9.3 9.6   EGFR mL/min/1.73 7.8* 10.1* 11.8*   BILIRUBIN mg/dL 0.3 0.3  --    ALK PHOS U/L 119* 124*  --    ALT (SGPT) U/L 10 10  --    AST (SGOT) U/L 16 21  --    GLUCOSE mg/dL 221* 199* 236*       Radiology:   Imaging Results (Last 72 Hours)       ** No results found for the last 72 hours. **            Culture:  Blood Culture   Date Value Ref Range Status   11/15/2023 No growth at 24 hours  Preliminary   11/15/2023 No growth at 24 hours  Preliminary         Assessment    End stage renal disease on HD MWF  Type 2 diabetes  Hypertension  Peripheral vascular disease  Anemia of CKD  Metabolic encephalopathy--improving    Plan:  Hemodialysis treatment tomorrow  Continue ANDI Lux MD  11/23/2023  09:57 CST

## 2023-11-24 ENCOUNTER — READMISSION MANAGEMENT (OUTPATIENT)
Dept: CALL CENTER | Facility: HOSPITAL | Age: 73
End: 2023-11-24
Payer: OTHER GOVERNMENT

## 2023-11-24 VITALS
OXYGEN SATURATION: 100 % | HEIGHT: 63 IN | WEIGHT: 131 LBS | TEMPERATURE: 97.8 F | DIASTOLIC BLOOD PRESSURE: 66 MMHG | RESPIRATION RATE: 16 BRPM | BODY MASS INDEX: 23.21 KG/M2 | HEART RATE: 81 BPM | SYSTOLIC BLOOD PRESSURE: 133 MMHG

## 2023-11-24 LAB
ALBUMIN SERPL-MCNC: 3.6 G/DL (ref 3.5–5.2)
ALBUMIN/GLOB SERPL: 1.2 G/DL
ALP SERPL-CCNC: 116 U/L (ref 39–117)
ALT SERPL W P-5'-P-CCNC: 8 U/L (ref 1–33)
ANION GAP SERPL CALCULATED.3IONS-SCNC: 14 MMOL/L (ref 5–15)
AST SERPL-CCNC: 15 U/L (ref 1–32)
BASOPHILS # BLD AUTO: 0.05 10*3/MM3 (ref 0–0.2)
BASOPHILS NFR BLD AUTO: 0.4 % (ref 0–1.5)
BILIRUB SERPL-MCNC: 0.3 MG/DL (ref 0–1.2)
BUN SERPL-MCNC: 66 MG/DL (ref 8–23)
BUN/CREAT SERPL: 10.5 (ref 7–25)
CALCIUM SPEC-SCNC: 9.5 MG/DL (ref 8.6–10.5)
CHLORIDE SERPL-SCNC: 99 MMOL/L (ref 98–107)
CO2 SERPL-SCNC: 23 MMOL/L (ref 22–29)
CREAT SERPL-MCNC: 6.29 MG/DL (ref 0.57–1)
DEPRECATED RDW RBC AUTO: 48.4 FL (ref 37–54)
EGFRCR SERPLBLD CKD-EPI 2021: 6.6 ML/MIN/1.73
EOSINOPHIL # BLD AUTO: 0.25 10*3/MM3 (ref 0–0.4)
EOSINOPHIL NFR BLD AUTO: 2.2 % (ref 0.3–6.2)
ERYTHROCYTE [DISTWIDTH] IN BLOOD BY AUTOMATED COUNT: 13.4 % (ref 12.3–15.4)
FOLATE SERPL-MCNC: 5.04 NG/ML (ref 4.78–24.2)
GLOBULIN UR ELPH-MCNC: 3.1 GM/DL
GLUCOSE BLDC GLUCOMTR-MCNC: 209 MG/DL (ref 70–130)
GLUCOSE BLDC GLUCOMTR-MCNC: 227 MG/DL (ref 70–130)
GLUCOSE SERPL-MCNC: 144 MG/DL (ref 65–99)
HCT VFR BLD AUTO: 29.2 % (ref 34–46.6)
HGB BLD-MCNC: 9.3 G/DL (ref 12–15.9)
IMM GRANULOCYTES # BLD AUTO: 0.04 10*3/MM3 (ref 0–0.05)
IMM GRANULOCYTES NFR BLD AUTO: 0.4 % (ref 0–0.5)
LYMPHOCYTES # BLD AUTO: 1.52 10*3/MM3 (ref 0.7–3.1)
LYMPHOCYTES NFR BLD AUTO: 13.6 % (ref 19.6–45.3)
MCH RBC QN AUTO: 31.5 PG (ref 26.6–33)
MCHC RBC AUTO-ENTMCNC: 31.8 G/DL (ref 31.5–35.7)
MCV RBC AUTO: 99 FL (ref 79–97)
MONOCYTES # BLD AUTO: 1.43 10*3/MM3 (ref 0.1–0.9)
MONOCYTES NFR BLD AUTO: 12.8 % (ref 5–12)
NEUTROPHILS NFR BLD AUTO: 7.88 10*3/MM3 (ref 1.7–7)
NEUTROPHILS NFR BLD AUTO: 70.6 % (ref 42.7–76)
NRBC BLD AUTO-RTO: 0 /100 WBC (ref 0–0.2)
PLATELET # BLD AUTO: 264 10*3/MM3 (ref 140–450)
PMV BLD AUTO: 10.8 FL (ref 6–12)
POTASSIUM SERPL-SCNC: 4.4 MMOL/L (ref 3.5–5.2)
PROT SERPL-MCNC: 6.7 G/DL (ref 6–8.5)
RBC # BLD AUTO: 2.95 10*6/MM3 (ref 3.77–5.28)
SODIUM SERPL-SCNC: 136 MMOL/L (ref 136–145)
VIT B12 BLD-MCNC: 1967 PG/ML (ref 211–946)
WBC NRBC COR # BLD AUTO: 11.17 10*3/MM3 (ref 3.4–10.8)

## 2023-11-24 PROCEDURE — 99232 SBSQ HOSP IP/OBS MODERATE 35: CPT | Performed by: PSYCHIATRY & NEUROLOGY

## 2023-11-24 PROCEDURE — 80053 COMPREHEN METABOLIC PANEL: CPT | Performed by: INTERNAL MEDICINE

## 2023-11-24 PROCEDURE — 82948 REAGENT STRIP/BLOOD GLUCOSE: CPT

## 2023-11-24 PROCEDURE — 63710000001 INSULIN LISPRO (HUMAN) PER 5 UNITS: Performed by: FAMILY MEDICINE

## 2023-11-24 PROCEDURE — 63710000001 INSULIN DETEMIR PER 5 UNITS: Performed by: INTERNAL MEDICINE

## 2023-11-24 PROCEDURE — 97110 THERAPEUTIC EXERCISES: CPT

## 2023-11-24 PROCEDURE — 85025 COMPLETE CBC W/AUTO DIFF WBC: CPT | Performed by: INTERNAL MEDICINE

## 2023-11-24 PROCEDURE — 97530 THERAPEUTIC ACTIVITIES: CPT

## 2023-11-24 RX ADMIN — HYDROCODONE BITARTRATE AND ACETAMINOPHEN 1 TABLET: 5; 325 TABLET ORAL at 06:28

## 2023-11-24 RX ADMIN — LISINOPRIL 2.5 MG: 2.5 TABLET ORAL at 09:17

## 2023-11-24 RX ADMIN — Medication 10 ML: at 08:53

## 2023-11-24 RX ADMIN — INSULIN LISPRO 4 UNITS: 100 INJECTION, SOLUTION INTRAVENOUS; SUBCUTANEOUS at 08:51

## 2023-11-24 RX ADMIN — HYDROCODONE BITARTRATE AND ACETAMINOPHEN 1 TABLET: 5; 325 TABLET ORAL at 02:45

## 2023-11-24 RX ADMIN — INSULIN DETEMIR 7 UNITS: 100 INJECTION, SOLUTION SUBCUTANEOUS at 08:51

## 2023-11-24 RX ADMIN — PANTOPRAZOLE SODIUM 40 MG: 40 TABLET, DELAYED RELEASE ORAL at 06:29

## 2023-11-24 RX ADMIN — HYDROCODONE BITARTRATE AND ACETAMINOPHEN 1 TABLET: 5; 325 TABLET ORAL at 12:09

## 2023-11-24 RX ADMIN — ISOSORBIDE MONONITRATE 30 MG: 30 TABLET, EXTENDED RELEASE ORAL at 09:17

## 2023-11-24 RX ADMIN — AMLODIPINE BESYLATE 5 MG: 5 TABLET ORAL at 09:17

## 2023-11-24 RX ADMIN — ASPIRIN 81 MG: 81 TABLET, CHEWABLE ORAL at 09:17

## 2023-11-24 RX ADMIN — HYDROCODONE BITARTRATE AND ACETAMINOPHEN 1 TABLET: 5; 325 TABLET ORAL at 17:59

## 2023-11-24 NOTE — PLAN OF CARE
Goal Outcome Evaluation:  Plan of Care Reviewed With: patient        Progress: no change     Pt alert and oriented to self only, pt appears lethargic at times. Pt c/o foot pain. PRN medications given with little relief. , room air. Pt refused wound care on left toe. Pt voided 250 mL this afternoon. Up x 1 with walker. BS monitored. Bed alarm set. Sitter at bedside. Call light within reach. Safety maintained. No neuro changes this shift.

## 2023-11-24 NOTE — THERAPY TREATMENT NOTE
Acute Care - Physical Therapy Treatment Note  [facility]     Patient Name: Harmony Montoya  : 1950  MRN: [id number]  Today's Date: 2023      Visit Dx:     ICD-10-CM ICD-9-CM   1. End stage renal disease on dialysis  N18.6 585.6    Z99.2 V45.11   2. Arterial occlusion, lower extremity  I70.209 444.22   3. Intractable pain  R52 780.96   4. Ischemic rest pain of lower extremity  M79.606 729.5    I99.8 459.9   5. Impaired mobility [Z74.09]  Z74.09 799.89     Patient Active Problem List   Diagnosis    Pleural effusion    End stage renal disease on dialysis    Ischemic rest pain of lower extremity    Type 2 diabetes mellitus, with long-term current use of insulin    Primary hypertension    Coronary artery disease involving native coronary artery of native heart without angina pectoris    Chronic GERD    Acute encephalopathy    Hypertensive encephalopathy     Past Medical History:   Diagnosis Date    Diabetes mellitus     Hypertension      History reviewed. No pertinent surgical history.  PT Assessment (last 12 hours)       PT Evaluation and Treatment       Row Name 23 0744          Physical Therapy Time and Intention    Subjective Information complains of;pain  -AB     Document Type therapy note (daily note)  -AB     Mode of Treatment physical therapy  -AB       Row Name 23 0744          General Information    Existing Precautions/Restrictions fall  -AB       Row Name 23 0744          Pain    Pretreatment Pain Rating 8/10  -AB     Posttreatment Pain Rating /10  -AB     Pain Location - Side/Orientation Left  -AB     Pre/Posttreatment Pain Comment L Foot and shin  -AB       Row Name 23 0744          Bed Mobility    Supine-Sit Tyrrell (Bed Mobility) moderate assist (50% patient effort)  -AB     Sit-Supine Tyrrell (Bed Mobility) moderate assist (50% patient effort)  -AB       Row Name 23 0744          Sit-Stand Transfer    Sit-Stand Tyrrell (Transfers) moderate assist  (50% patient effort);2 person assist  -AB     Assistive Device (Sit-Stand Transfers) walker, front-wheeled  -AB     Comment, (Sit-Stand Transfer) stood, wouldn't put L LE down  -AB       Row Name 11/24/23 0744          Balance    Static Sitting Balance supervision  -AB     Position, Sitting Balance sitting edge of bed  -AB     Static Standing Balance contact guard  -AB       Row Name 11/24/23 0744          Motor Skills    Comments, Therapeutic Exercise sitting 10 reps AROM  -AB     Additional Documentation Comments, Therapeutic Exercise (Row)  -AB       Row Name             Wound 11/21/23 0942 Left anterior fourth toe    Wound - Properties Group Placement Date: 11/21/23  -MG Placement Time: 0942  -MG Present on Original Admission: Y  -MG Side: Left  -MG Orientation: anterior  -MG Location: fourth toe  -MG    Retired Wound - Properties Group Placement Date: 11/21/23  -MG Placement Time: 0942  -MG Present on Original Admission: Y  -MG Side: Left  -MG Orientation: anterior  -MG Location: fourth toe  -MG    Retired Wound - Properties Group Date first assessed: 11/21/23  -MG Time first assessed: 0942  -MG Present on Original Admission: Y  -MG Side: Left  -MG Location: fourth toe  -MG      Row Name 11/24/23 0744          Plan of Care Review    Plan of Care Reviewed With patient  -AB     Outcome Evaluation PT treatment complete, C/O pain in L foot and shin. She was mod assist to get to EOB, sitting balance Supervision - CGA. Performed sitting ex's 10 reps with vc's for full ROM with L knee extension. Sit/stand mod x 2 with Rwx, CGA - min for standing balance but wouldn't place L LE on floor. She was unable to shift her foot or hop. returned to EOB and was left sitting with Nsg to eat breakfast.  -AB       Row Name 11/24/23 0744          Positioning and Restraints    Pre-Treatment Position in bed  -AB     Post Treatment Position bed  -AB     In Bed sitting EOB;call light within reach;with nsg  -AB               User Key   (r) = Recorded By, (t) = Taken By, (c) = Cosigned By      Initials Name Provider Type    AB Karie Meneses, PTA Physical Therapist Assistant    Carlos Hawkins RN Registered Nurse                    Physical Therapy Education       Title: PT OT SLP Therapies (In Progress)       Topic: Physical Therapy (In Progress)       Point: Mobility training (In Progress)       Learning Progress Summary             Patient Acceptance, E,D, NR by JULIA at 11/21/2023 1159    Comment: Safety with sit to stand to sit transfers    Acceptance, E, NR by SILVINA at 11/20/2023 1300    Comment: benefits of activity, progression of PT                         Point: Home exercise program (Not Started)       Learner Progress:  Not documented in this visit.              Point: Body mechanics (Not Started)       Learner Progress:  Not documented in this visit.              Point: Precautions (Not Started)       Learner Progress:  Not documented in this visit.                              User Key       Initials Effective Dates Name Provider Type Discipline    SILVINA 02/03/23 -  Bashir Burns, PT DPT Physical Therapist PT    JULIA 02/03/23 -  Ivon Briceno PTA Physical Therapist Assistant PT                  PT Recommendation and Plan     Plan of Care Reviewed With: patient  Outcome Evaluation: PT treatment complete, C/O pain in L foot and shin. She was mod assist to get to EOB, sitting balance Supervision - CGA. Performed sitting ex's 10 reps with vc's for full ROM with L knee extension. Sit/stand mod x 2 with Rwx, CGA - min for standing balance but wouldn't place L LE on floor. She was unable to shift her foot or hop. returned to EOB and was left sitting with Nsg to eat breakfast.   Outcome Measures       Row Name 11/23/23 1100 11/22/23 1500 11/21/23 1100       How much help from another person do you currently need...    Turning from your back to your side while in flat bed without using bedrails? 1  -TB 4  -KJ 4  -JULIA    Moving from lying on  back to sitting on the side of a flat bed without bedrails? 1  -TB 4  -KJ 4  -JULIA    Moving to and from a bed to a chair (including a wheelchair)? 1  -TB 3  -KJ 3  -JULIA    Standing up from a chair using your arms (e.g., wheelchair, bedside chair)? 1  -TB 2  -KJ 3  -JULIA    Climbing 3-5 steps with a railing? 1  -TB 1  -KJ 1  -JULIA    To walk in hospital room? 1  -TB 2  -KJ 2  -JULIA    AM-PAC 6 Clicks Score (PT) 6  -TB 16  -KJ 17  -JULIA    Highest Level of Mobility Goal 2 --> Bed activities/dependent transfer  -TB 5 --> Static standing  -KJ 5 --> Static standing  -JULIA       Functional Assessment    Outcome Measure Options AM-PAC 6 Clicks Basic Mobility (PT)  -TB AM-PAC 6 Clicks Basic Mobility (PT)  -KJ --              User Key  (r) = Recorded By, (t) = Taken By, (c) = Cosigned By      Initials Name Provider Type    KJ Alyse Tomlinson, PTA Physical Therapist Assistant    JULIA Ivon Briceno, PTA Physical Therapist Assistant    Yasmin Hunt, KATE Physical Therapist Assistant                     Time Calculation:    PT Charges       Row Name 11/24/23 0744             Time Calculation    Start Time 0744  -AB      Stop Time 0822  -AB      Time Calculation (min) 38 min  -AB      PT Received On 11/24/23  -AB         Time Calculation- PT    Total Timed Code Minutes- PT 38 minute(s)  -AB                User Key  (r) = Recorded By, (t) = Taken By, (c) = Cosigned By      Initials Name Provider Type    AB Karie Meneses PTA Physical Therapist Assistant                  Therapy Charges for Today       Code Description Service Date Service Provider Modifiers Qty    05499304294 HC PT THERAPEUTIC ACT EA 15 MIN 11/24/2023 Karie Meneses PTA GP 2    21020990586 HC PT THER PROC EA 15 MIN 11/24/2023 Karie Meneses, KATE GP 1            PT G-Codes  Outcome Measure Options: AM-PAC 6 Clicks Basic Mobility (PT)  AM-PAC 6 Clicks Score (PT): 6    Karie Meneses PTA  11/24/2023

## 2023-11-24 NOTE — PLAN OF CARE
Goal Outcome Evaluation:   Patient receiving HD and then to discharge home.   Provider notified that nephrology had not signed off of the case and ordered repeat labs in the morning. Provider continued with discharge.

## 2023-11-24 NOTE — PLAN OF CARE
Problem: Adult Inpatient Plan of Care  Goal: Plan of Care Review  11/24/2023 0421 by Magaly Fernando RN  Outcome: Ongoing, Progressing  Flowsheets (Taken 11/24/2023 0421)  Progress: no change  Plan of Care Reviewed With: patient  Outcome Evaluation:   Patient alert to self only. Refused dressing change to necrotic toe this shift   foot remains open to air. No drainage. Dialysis ordered for Friday 11/24/23. Pain meds given twice PO per order with good results. Restless most of the shift   sitter at bedside. Intermittently sleeping. Safety maintainted.

## 2023-11-24 NOTE — PROGRESS NOTES
Nephrology ([facility]) Progress Note      Patient:  Harmony Montoya  YOB: 1950  Date of Service: 11/24/2023  MRN: [id number]   Acct: [id number]   Primary Care Physician: Provider, No Known  Advance Directive:   Code Status and Medical Interventions:   Ordered at: 11/15/23 6100     Code Status (Patient has no pulse and is not breathing):    CPR (Attempt to Resuscitate)     Medical Interventions (Patient has pulse or is breathing):    Full Support     Admit Date: 11/15/2023       Hospital Day: 9  Referring Provider: No ref. provider found      Patient personally seen and examined.  Complete chart including Consults, Notes, Operative Reports, Labs, Cardiology, and Radiology studies reviewed as able.    Chief complaint: Abnormal labs.    Subjective:  Harmony Montoya is a 73 y.o. female for whom we were consulted for evaluation and treatment of end stage renal disease. Maintenance hemodialysis Monday Wednesday Friday at [facility]. Missed HD on 11/15. History of type 2 diabetes and hypertension. Presented with pain in left foot. Admitted for vascular evaluation. Underwent make-up dialysis on 11/16. Tolerated treatment well but later developed some confusion and was moved to CCU. Tolerated HD on 11/18 &11/20. Plans for angiogram have now been put on hold per Dr Villagomez.  Moved to medical floor on 11/21.    This morning patient feels well.  She agreed to get routine dialysis treatment today.  She denies any shortness of breath or headache    Statins    Home Meds:  Medications Prior to Admission   Medication Sig Dispense Refill Last Dose    Ascorbic Acid (Vitamin C) 500 MG capsule Take 2 capsules by mouth Daily.   11/15/2023    aspirin 81 MG chewable tablet Chew 1 tablet Daily.   11/14/2023    carvedilol (COREG) 25 MG tablet Take 1 tablet by mouth 2 (Two) Times a Day With Meals.   11/15/2023    clopidogrel (PLAVIX) 75 MG tablet Take 1 tablet by mouth Daily.   11/14/2023     diphenhydrAMINE (BENADRYL) 25 mg capsule Take 1 capsule by mouth Daily.   11/14/2023    insulin glargine (LANTUS, SEMGLEE) 100 UNIT/ML injection Inject 7 Units under the skin into the appropriate area as directed Daily.   11/15/2023    lisinopril (PRINIVIL,ZESTRIL) 20 MG tablet Take 0.5 tablets by mouth Daily.   11/15/2023    omeprazole (priLOSEC) 40 MG capsule Take 1 capsule by mouth Daily.   11/15/2023    vitamin D3 125 MCG (5000 UT) capsule capsule Take 1 capsule by mouth Daily.   11/15/2023    insulin regular (humuLIN R,novoLIN R) 100 UNIT/ML injection Inject 6 Units under the skin into the appropriate area as directed Daily Before Supper.       Polyvinyl Alcohol-Povidone PF (HYPOTEARS) 1.4-0.6 % ophthalmic solution Administer 1 drop to both eyes 4 (Four) Times a Day.          Medicines:  Current Facility-Administered Medications   Medication Dose Route Frequency Provider Last Rate Last Admin    acetaminophen (TYLENOL) tablet 650 mg  650 mg Oral Q6H PRN Da Alexander MD        amLODIPine (NORVASC) tablet 5 mg  5 mg Oral Q24H Salvatore Miller MD   5 mg at 11/24/23 0917    aspirin chewable tablet 81 mg  81 mg Oral Daily Salvatore Miller MD   81 mg at 11/24/23 0917    sennosides-docusate (PERICOLACE) 8.6-50 MG per tablet 2 tablet  2 tablet Oral BID Salvatore Miller MD   2 tablet at 11/22/23 2126    And    polyethylene glycol (MIRALAX) packet 17 g  17 g Oral Daily PRN Salvatore Miller MD        And    bisacodyl (DULCOLAX) EC tablet 5 mg  5 mg Oral Daily PRN Salvatore Miller MD        And    bisacodyl (DULCOLAX) suppository 10 mg  10 mg Rectal Daily PRN Salvatore Miller MD        cloNIDine (CATAPRES) tablet 0.1 mg  0.1 mg Oral Q6H PRN Salvatore Miller MD   0.1 mg at 11/20/23 1546    dextrose (D50W) (25 g/50 mL) IV injection 25 g  25 g Intravenous Q15 Min PRN Salvatore Miller MD        dextrose (GLUTOSE) oral gel 15 g  15 g Oral Q15 Min PRN Paul,  Salvatore Novak MD        epoetin halle-epbx (RETACRIT) 5,000 Units 2 mL injection  5,000 Units Subcutaneous Once per day on Mon Wed Fri Jaswinder Lux MD        glucagon (GLUCAGEN) injection 1 mg  1 mg Intramuscular Q15 Min PRN Salvatore Miller MD        heparin (porcine) injection 1,700 Units  30 Units/kg Intravenous PRN Salvatore Miller MD        heparin (porcine) injection 3,400 Units  60 Units/kg Intravenous PRN Salvatore Miller MD   3,400 Units at 11/20/23 0055    hydrALAZINE (APRESOLINE) injection 10 mg  10 mg Intravenous Q6H PRN Salvatore Miller MD   10 mg at 11/20/23 0033    HYDROcodone-acetaminophen (NORCO) 5-325 MG per tablet 1 tablet  1 tablet Oral Q4H PRN Da Alexander MD   1 tablet at 11/24/23 0628    insulin detemir (LEVEMIR) injection 7 Units  7 Units Subcutaneous Daily Da Alexander MD   7 Units at 11/24/23 0851    Insulin Lispro (humaLOG) injection 2-9 Units  2-9 Units Subcutaneous 4x Daily AC & at Bedtime Salvatore Miller MD   4 Units at 11/24/23 0851    isosorbide mononitrate (IMDUR) 24 hr tablet 30 mg  30 mg Oral Q24H Salvatore Miller MD   30 mg at 11/24/23 0917    labetalol (NORMODYNE,TRANDATE) injection 10 mg  10 mg Intravenous Q4H PRN Salvatore Miller MD   10 mg at 11/20/23 0049    lisinopril (PRINIVIL,ZESTRIL) tablet 2.5 mg  2.5 mg Oral BID Salvatore Miller MD   2.5 mg at 11/24/23 0917    nitroglycerin (NITROSTAT) SL tablet 0.4 mg  0.4 mg Sublingual Q5 Min PRN Salvatore Miller MD   0.4 mg at 11/20/23 0437    ondansetron (ZOFRAN) injection 4 mg  4 mg Intravenous Q6H PRN Salvatore Miller MD   4 mg at 11/20/23 0126    pantoprazole (PROTONIX) EC tablet 40 mg  40 mg Oral Q AM Salvatore Miller MD   40 mg at 11/24/23 0629    QUEtiapine (SEROquel) tablet 25 mg  25 mg Oral Nightly Da Alexander MD   25 mg at 11/23/23 2038    sodium chloride 0.9 % flush 10 mL  10 mL Intravenous Q12H Salvatore Miller  MD Kishore   10 mL at 23 0853    sodium chloride 0.9 % flush 10 mL  10 mL Intravenous PRN Salvatore Miller MD           Past Medical History:  Past Medical History:   Diagnosis Date    Diabetes mellitus     Hypertension        Past Surgical History:  History reviewed. No pertinent surgical history.    Family History  History reviewed. No pertinent family history.    Social History  Social History     Socioeconomic History    Marital status: Single   Tobacco Use    Smoking status: Former     Types: Cigarettes     Quit date: 2021     Years since quittin.5    Smokeless tobacco: Never   Vaping Use    Vaping Use: Never used   Substance and Sexual Activity    Alcohol use: Never    Drug use: Never       Review of Systems:  History obtained from chart review and the patient  General ROS: No fever or chills  Respiratory ROS: No cough, shortness of breath, wheezing  Cardiovascular ROS: No chest pain or palpitations  Gastrointestinal ROS: No abdominal pain or melena  Genito-Urinary ROS: No dysuria or hematuria  Psych ROS: No anxiety and depression  14 point ROS reviewed with the patient and negative except as noted above and in the HPI unless unable to obtain.    Objective:  Patient Vitals for the past 24 hrs:   BP Temp Temp src Pulse Resp SpO2   23 1119 133/66 97.8 °F (36.6 °C) Axillary 81 16 100 %   23 0711 163/60 97.7 °F (36.5 °C) Axillary 91 16 96 %   23 0222 175/62 98.2 °F (36.8 °C) Oral 90 16 100 %   23 2238 147/55 98.2 °F (36.8 °C) Axillary 83 16 98 %   23 1903 170/68 98.4 °F (36.9 °C) Oral 85 16 100 %   23 1430 161/55 98 °F (36.7 °C) Axillary 82 16 99 %       Intake/Output Summary (Last 24 hours) at 2023 1125  Last data filed at 2023 1000  Gross per 24 hour   Intake 200 ml   Output 800 ml   Net -600 ml       General: awake/alert   HEENT: Normocephalic atraumatic head  Neck: Supple with no JVD or carotid bruits.  Chest:  clear to auscultation  bilaterally without respiratory distress  CVS: regular rate and rhythm  Abdominal: soft, nontender, positive bowel sounds  Extremities: no cyanosis or edema  Skin: warm and dry without rash      Labs:  Results from last 7 days   Lab Units 11/24/23  0544 11/23/23  0454 11/22/23  0903   WBC 10*3/mm3 11.17* 11.87* 11.86*   HEMOGLOBIN g/dL 9.3* 9.1* 10.5*   HEMATOCRIT % 29.2* 29.9* 34.7   PLATELETS 10*3/mm3 264 237 225         Results from last 7 days   Lab Units 11/24/23  0544 11/23/23  0454 11/22/23  0903   SODIUM mmol/L 136 133* 132*   POTASSIUM mmol/L 4.4 4.3 4.0   CHLORIDE mmol/L 99 95* 94*   CO2 mmol/L 23.0 24.0 21.0*   BUN mg/dL 66* 58* 48*   CREATININE mg/dL 6.29* 5.44* 4.39*   CALCIUM mg/dL 9.5 9.1 9.3   EGFR mL/min/1.73 6.6* 7.8* 10.1*   BILIRUBIN mg/dL 0.3 0.3 0.3   ALK PHOS U/L 116 119* 124*   ALT (SGPT) U/L 8 10 10   AST (SGOT) U/L 15 16 21   GLUCOSE mg/dL 144* 221* 199*       Radiology:   Imaging Results (Last 72 Hours)       Procedure Component Value Units Date/Time    CT Head Without Contrast [[id number]] Collected: 11/23/23 1324     Updated: 11/23/23 1332    Narrative:      Exam: CT HEAD WO CONTRAST- 11/23/2023 12:29 PM CST     HISTORY: Mental status change, unknown cause; N18.6-End stage renal  disease; Z99.2-Dependence on renal dialysis; I70.209-Unspecified  atherosclerosis of native arteries of extremities, unspecified  extremity; R52-Pain, unspecified; M79.606-Pain in leg, unspecified;  I99.8-Other disorder of circulatory system; Z74.09-Other reduced  mobility       DOSE LENGTH PRODUCT: 820 mGy cm. Automated exposure control was also  utilized to decrease patient radiation dose.     Technique:   Helically acquired CT of the brain without IV contrast was performed.  Sagittal and coronal reformations are also provided for review. Soft  tissue and bone kernels are available for interpretation.     Comparison: None.     Findings:      Ventricles and extra-axial CSF spaces are normal in size.     No  intraparenchymal or extra-axial hemorrhage.     Gray-white matter differentiation is preserved. Moderate periventricular  white matter low-attenuation. Remote appearing left caudate head lacunar  infarct.     Orbits are grossly unremarkable. Paranasal sinuses are grossly clear.  Mastoid air cells are grossly clear.     No suspicious calvarial or extracranial soft tissue abnormality.     Other: Vascular calcifications.       Impression:      Impression:       No acute intracranial hemorrhage, mass effect, or large vascular  territorial infarct.     Moderate periventricular white matter low-attenuation, most likely  chronic small vessel ischemic changes.     Remote appearing left caudate head lacunar infarct.     This report was signed and finalized on 11/23/2023 1:28 PM CST by Malik Woods.                Culture:  Blood Culture   Date Value Ref Range Status   11/15/2023 No growth at 24 hours  Preliminary   11/15/2023 No growth at 24 hours  Preliminary         Assessment    End stage renal disease on HD MWF  Type 2 diabetes with nephropathy  Hypertension  Peripheral vascular disease  Anemia of CKD  Metabolic encephalopathy--improving    Plan:  Routine hemodialysis treatment today  Continue ANDI    Jaswinder Lux MD  11/24/2023  11:25 CST

## 2023-11-24 NOTE — DISCHARGE SUMMARY
"      [facility] Medicine Services  DISCHARGE SUMMARY       Date of Admission: 11/15/2023  Date of Discharge:  11/24/2023  Primary Care Physician: Provider, No Known    Presenting Problem/History of Present Illness:    73 year old female with PMH of ESRD, DM 2 ID, CAD, HTN, that presents to the ER with complaints of pain in left leg and inability to walk. She has had pain for about 5 weeks and has required help to walk due to pain. Yesterday she visited with Dr Juan Miguel Iraheta, general surgeon who wrote: \"I cannot feel for pulse due to severe pain of the foot. She has had no vascular studies done but highly suspect significant peripheral vascular disease. I recommend that she go to [place] or [facility] ER where there is vascular intervention capabilities soon as possible. \"     She was evaluated in the ER with US arterial doppler LE that is reported as: Chronic bilateral SFA and popliteal artery occlusions. There is reconstitution of flow in both tibial vessels in the lower   Extremities.     Dr Villagomez was consulted and is recommending admission to medical services, anticoagulation and possible revascularization versus amputation.     Dr Villagomez saw her here and decided to follow up as outpatient. Cardiology did lexiscan which was read as: \"Large area of infarction noted involving the anterior apical apical lateral and apical septal region consistent with occlusion of the proximal to mid left anterior descending coronary artery. No areas of ischemia identified\"    Cardiology recommended outpatient follow up.    Pt will receive HD today then go home.                          Final Discharge Diagnoses:  Active Hospital Problems    Diagnosis     **Ischemic rest pain of lower extremity     Acute encephalopathy     Hypertensive encephalopathy     End stage renal disease on dialysis     Type 2 diabetes mellitus, with long-term current use of insulin     Primary hypertension     Coronary " artery disease involving native coronary artery of native heart without angina pectoris     Chronic GERD        Consults:     Procedures Performed:     Pertinent Test Results:   Results for orders placed during the hospital encounter of 11/15/23    Adult Transthoracic Echo Complete W/ Cont if Necessary Per Protocol    Interpretation Summary    Left ventricular systolic function is low normal. Left ventricular ejection fraction appears to be 41 - 45%.    The following left ventricular wall segments are akinetic: apical anterior, apical lateral, apical inferior, apical septal, apex and mid anteroseptal.    Left ventricular diastolic function was indeterminate.    Estimated right ventricular systolic pressure from tricuspid regurgitation is normal (<35 mmHg).      Imaging Results (All)       Procedure Component Value Units Date/Time    CT Head Without Contrast [[id number]] Collected: 11/23/23 1324     Updated: 11/23/23 1332    Narrative:      Exam: CT HEAD WO CONTRAST- 11/23/2023 12:29 PM CST     HISTORY: Mental status change, unknown cause; N18.6-End stage renal  disease; Z99.2-Dependence on renal dialysis; I70.209-Unspecified  atherosclerosis of native arteries of extremities, unspecified  extremity; R52-Pain, unspecified; M79.606-Pain in leg, unspecified;  I99.8-Other disorder of circulatory system; Z74.09-Other reduced  mobility       DOSE LENGTH PRODUCT: 820 mGy cm. Automated exposure control was also  utilized to decrease patient radiation dose.     Technique:   Helically acquired CT of the brain without IV contrast was performed.  Sagittal and coronal reformations are also provided for review. Soft  tissue and bone kernels are available for interpretation.     Comparison: None.     Findings:      Ventricles and extra-axial CSF spaces are normal in size.     No intraparenchymal or extra-axial hemorrhage.     Gray-white matter differentiation is preserved. Moderate periventricular  white matter low-attenuation.  Remote appearing left caudate head lacunar  infarct.     Orbits are grossly unremarkable. Paranasal sinuses are grossly clear.  Mastoid air cells are grossly clear.     No suspicious calvarial or extracranial soft tissue abnormality.     Other: Vascular calcifications.       Impression:      Impression:       No acute intracranial hemorrhage, mass effect, or large vascular  territorial infarct.     Moderate periventricular white matter low-attenuation, most likely  chronic small vessel ischemic changes.     Remote appearing left caudate head lacunar infarct.     This report was signed and finalized on 11/23/2023 1:28 PM CST by Malik Woods.        US Arterial Doppler Lower Extremity Complete [[id number]] Collected: 11/15/23 1616     Updated: 11/15/23 1621    Narrative:         History: PAD     Comments: Grayscale imaging as well as color flow duplex were used to  evaluate bilateral lower extremity arterial systems.     On the right, the peak systolic velocity in the common femoral artery  measured 240.6 cm/s. In the profunda femoris artery measured 200.7 cm/s.  The entire SFA and popliteal artery are occluded. The posterior tibial  artery is also occluded. In the anterior tibial artery measured 63.3  cm/s.     On the left, the peak systolic velocity in the common femoral artery  measured 39.1 cm/s. In the profunda femoris artery measured 64 cm/s. In  the proximal SFA measured 21.2 cm/s. The mid to distal SFA and popliteal  artery are occluded. In the posterior tibial artery measured 13 cm/s. In  the anterior tibial artery measured 32.4 cm/s.       Impression:      Chronic bilateral SFA and popliteal artery occlusions. There  is reconstitution of flow in both tibial vessels in the lower  extremities.     This report was signed and finalized on 11/15/2023 4:18 PM CST by Dr. Kwame Villagomez MD.       XR Toe 2+ View Left [[id number]] Collected: 11/15/23 1306     Updated: 11/15/23 1311    Narrative:      EXAMINATION:  XR TOE 2+ VW LEFT- 11/15/2023 1:06 PM CST     HISTORY: Concern for osteomyelitis left fourth toe from possible dry  gangrene.     REPORT: 3 views of the left toes were obtained.     COMPARISON: There are no correlative imaging studies for comparison.     There is diffuse osteopenia, no fracture is identified. No bony  destruction is identified and the joint spaces are preserved. There is  moderate overgrowth of the first metatarsal head with bunion formation.  Vascular calcifications are present.       Impression:      No acute osseous abnormality, no evidence of osteomyelitis.  There is diffuse osteopenia.     This report was signed and finalized on 11/15/2023 1:08 PM CST by Dr. Rafael Amor MD.             LAB RESULTS:      Lab 11/24/23  0544 11/23/23  0454 11/22/23  0903 11/20/23  0927 11/20/23  0603 11/20/23  0020 11/19/23  1903 11/19/23  1210 11/19/23  0651 11/18/23  1543 11/18/23  0655   WBC 11.17* 11.87* 11.86*  --   --   --   --   --  8.11  --  8.52   HEMOGLOBIN 9.3* 9.1* 10.5*  --   --   --   --   --  10.3*  --  10.0*   HEMATOCRIT 29.2* 29.9* 34.7  --   --   --   --   --  33.0*  --  31.9*   PLATELETS 264 237 225  --   --   --   --   --  206  --  188   NEUTROS ABS 7.88* 8.19* 8.74*  --   --   --   --   --  4.92  --  5.61   IMMATURE GRANS (ABS) 0.04 0.06* 0.05  --   --   --   --   --  0.03  --  0.02   LYMPHS ABS 1.52 1.82 1.86  --   --   --   --   --  1.88  --  1.52   MONOS ABS 1.43* 1.48* 0.95*  --   --   --   --   --  1.08*  --  1.23*   EOS ABS 0.25 0.26 0.20  --   --   --   --   --  0.15  --  0.09   MCV 99.0* 101.7* 102.1*  --   --   --   --   --  99.7*  --  99.4*   APTT  --   --   --  51.7* 112.4* 55.4* 108.9* 54.5* 93.2*   < > 44.4*    < > = values in this interval not displayed.         Lab 11/24/23  0544 11/23/23  1649 11/23/23  0454 11/22/23  0903 11/19/23  0651 11/18/23  0655   SODIUM 136  --  133* 132* 138 135*   POTASSIUM 4.4  --  4.3 4.0 4.1 4.2   CHLORIDE 99  --  95* 94* 98 96*   CO2 23.0  --  " 24.0 21.0* 27.0 26.0   ANION GAP 14.0  --  14.0 17.0* 13.0 13.0   BUN 66*  --  58* 48* 42* 27*   CREATININE 6.29*  --  5.44* 4.39* 3.85* 2.99*   EGFR 6.6*  --  7.8* 10.1* 11.8* 16.0*   GLUCOSE 144*  --  221* 199* 236* 212*   CALCIUM 9.5  --  9.1 9.3 9.6 9.2   TSH  --  0.906  --   --   --   --          Lab 11/24/23  0544 11/23/23  0454 11/22/23  0903   TOTAL PROTEIN 6.7 6.6 7.2   ALBUMIN 3.6 3.5 3.5   GLOBULIN 3.1 3.1 3.7   ALT (SGPT) 8 10 10   AST (SGOT) 15 16 21   BILIRUBIN 0.3 0.3 0.3   ALK PHOS 116 119* 124*         Lab 11/20/23  0927 11/20/23  0604   HSTROP T 129* 44*             Lab 11/23/23  1649 11/20/23  0927   FOLATE 5.04  --    VITAMIN B 12 1,967*  --    ABO TYPING  --  B   RH TYPING  --  Positive   ANTIBODY SCREEN  --  Negative         Brief Urine Lab Results       None          Microbiology Results (last 10 days)       Procedure Component Value - Date/Time    Blood Culture - Blood, Hand, Digit Right [[id number]]  (Normal) Collected: 11/15/23 1237    Lab Status: Final result Specimen: Blood from Hand, Digit Right Updated: 11/20/23 1301     Blood Culture No growth at 5 days    Blood Culture - Blood, Hand, Digit Right [[id number]]  (Normal) Collected: 11/15/23 1158    Lab Status: Final result Specimen: Blood from Hand, Digit Right Updated: 11/20/23 1230     Blood Culture No growth at 5 days            Hospital Course:       Physical Exam on Discharge:  /66 (BP Location: Right arm, Patient Position: Lying)   Pulse 81   Temp 97.8 °F (36.6 °C) (Axillary)   Resp 16   Ht 160 cm (63\")   Wt 59.4 kg (131 lb)   SpO2 100%   BMI 23.21 kg/m²   Physical Exam      Condition on Discharge:     Discharge Disposition:  Home or Self Care    Discharge Medications:     Discharge Medications        Continue These Medications        Instructions Start Date   aspirin 81 MG chewable tablet   81 mg, Oral, Daily      carvedilol 25 MG tablet  Commonly known as: COREG   25 mg, Oral, 2 Times Daily With Meals      clopidogrel " 75 MG tablet  Commonly known as: PLAVIX   75 mg, Oral, Daily      diphenhydrAMINE 25 mg capsule  Commonly known as: BENADRYL   25 mg, Oral, Daily      insulin glargine 100 UNIT/ML injection  Commonly known as: LANTUS, SEMGLEE   7 Units, Subcutaneous, Daily      insulin regular 100 UNIT/ML injection  Commonly known as: humuLIN R,novoLIN R   6 Units, Subcutaneous, Daily Before Supper      lisinopril 20 MG tablet  Commonly known as: PRINIVIL,ZESTRIL   10 mg, Oral, Daily      omeprazole 40 MG capsule  Commonly known as: priLOSEC   40 mg, Oral, Daily      Polyvinyl Alcohol-Povidone PF 1.4-0.6 % ophthalmic solution  Commonly known as: HYPOTEARS   1 drop, Both Eyes, 4 Times Daily      Vitamin C 500 MG capsule   2 capsules, Oral, Daily      vitamin D3 125 MCG (5000 UT) capsule capsule   5,000 Units, Oral, Daily               This patient has current or prior documentation of an left ventricular ejection fraction (LVEF) of less than or equal to 40%.            Discharge Diet:     Activity at Discharge:     Follow-up Appointments:   No future appointments.    Test Results Pending at Discharge:     Electronically signed by Da Alexander MD, 11/24/23, 14:15 CST.    Time:  minutes.

## 2023-11-24 NOTE — PLAN OF CARE
Goal Outcome Evaluation:  Plan of Care Reviewed With: patient           Outcome Evaluation: PT treatment complete, C/O pain in L foot and shin. She was mod assist to get to EOB, sitting balance Supervision - CGA. Performed sitting ex's 10 reps with vc's for full ROM with L knee extension. Sit/stand mod x 2 with Rwx, CGA - min for standing balance but wouldn't place L LE on floor. She was unable to shift her foot or hop. returned to EOB and was left sitting with Nsg to eat breakfast.

## 2023-11-24 NOTE — CASE MANAGEMENT/SOCIAL WORK
Continued Stay Note   [place]     Patient Name: Harmony Montoya  MRN: [id number]  Today's Date: 11/24/2023    Admit Date: 11/15/2023    Plan: SNF?   Discharge Plan       Row Name 11/24/23 1235       Plan    Plan Comments Events noted. Pt has discharge orders in for home. Pt will resume previous outpt HD on MWF at [facility].    Final Discharge Disposition Code 01 - home or self-care                   Discharge Codes    No documentation.                 Expected Discharge Date and Time       Expected Discharge Date Expected Discharge Time    Nov 24, 2023               ALEX Rodríguez

## 2023-11-24 NOTE — PROGRESS NOTES
NEUROLOGY FOLLOW-UP     DOS: 2023  NAME: Harmony Montoya   : 1950  PCP: Provider, No Known  CC: Stroke  Referring MD: No ref. provider found  Neurological Problem and Interval History:  Patient is much improved. Patient is resolved to her neurological baseline with exception of some delirium.    Medications On Admission  Medications Prior to Admission   Medication Sig Dispense Refill Last Dose    Ascorbic Acid (Vitamin C) 500 MG capsule Take 2 capsules by mouth Daily.   11/15/2023    aspirin 81 MG chewable tablet Chew 1 tablet Daily.   2023    carvedilol (COREG) 25 MG tablet Take 1 tablet by mouth 2 (Two) Times a Day With Meals.   11/15/2023    clopidogrel (PLAVIX) 75 MG tablet Take 1 tablet by mouth Daily.   2023    diphenhydrAMINE (BENADRYL) 25 mg capsule Take 1 capsule by mouth Daily.   2023    insulin glargine (LANTUS, SEMGLEE) 100 UNIT/ML injection Inject 7 Units under the skin into the appropriate area as directed Daily.   11/15/2023    lisinopril (PRINIVIL,ZESTRIL) 20 MG tablet Take 0.5 tablets by mouth Daily.   11/15/2023    omeprazole (priLOSEC) 40 MG capsule Take 1 capsule by mouth Daily.   11/15/2023    vitamin D3 125 MCG (5000 UT) capsule capsule Take 1 capsule by mouth Daily.   11/15/2023    insulin regular (humuLIN R,novoLIN R) 100 UNIT/ML injection Inject 6 Units under the skin into the appropriate area as directed Daily Before Supper.       Polyvinyl Alcohol-Povidone PF (HYPOTEARS) 1.4-0.6 % ophthalmic solution Administer 1 drop to both eyes 4 (Four) Times a Day.            Laboratory Results:   Lab Results   Component Value Date    GLUCOSE 144 (H) 2023    CALCIUM 9.5 2023     2023    K 4.4 2023    CO2 23.0 2023    CL 99 2023    BUN 66 (H) 2023    CREATININE 6.29 (H) 2023    BCR 10.5 2023    ANIONGAP 14.0 2023     Lab Results   Component Value Date    WBC 11.17 (H) 2023    HGB 9.3 (L) 2023     "HCT 29.2 (L) 11/24/2023    MCV 99.0 (H) 11/24/2023     11/24/2023     No results found for: \"CHOL\"  No results found for: \"HDL\"  No results found for: \"LDL\"  No results found for: \"TRIG\"  No results found for: \"HGBA1C\"  Lab Results   Component Value Date    INR 1.10 (H) 11/15/2023    PROTIME 14.3 11/15/2023         Physical Examination:   /66 (BP Location: Right arm, Patient Position: Lying)   Pulse 81   Temp 97.8 °F (36.6 °C) (Axillary)   Resp 16   Ht 160 cm (63\")   Wt 59.4 kg (131 lb)   SpO2 100%   BMI 23.21 kg/m²   Neurological examination:  Higher Integrative  Function:        Oriented to time, place and person.   CN II:   Pupils are equal, round, and reactive to light. Blinks to visual threat and counts fingers in all fields  CN III IV VI:     Extraocular movements are full without nystagmus.   CN V:  Normal facial sensation   CN VII:            Facial movements are symmetric. No labial dysarthria  CN VIII:                                   Auditory acuity is normal.  CN IX & X:                              No palatal or pharnygeal dysarthria.  CN XI: Turns head without abnormality.   CN XII:                                    The tongue is midline.  No lingual dysarthria.   Motor: Normal muscle strength, bulk and tone in upper and lower extremities.  No fasciculations, rigidity, spasticity, or abnormal movements.  Reflexes:        Symmetrically reduced in the upper and lower extremities. Plantar responses are flexor.  Sensation:      Normal to light touch throughout, decreased in stocking glove pattern  Station and Gait:       Deferred for bed rest    Coordination:            Finger-to-nose test shows no dysmetria.             Diagnoses / Discussion:   73 y.o. female  with multiple cv risk factors and ESRD on HD and presents with chief complaint of altered mental status in the setting of acute pain, recent pain medications, hospitalization, and hyponatremia.This likely represents a " toxic-metabolic encephalopathy with a component of delirium.     Neurological exam is non-focal     Mental status is waxing and waning     HCT unremarkable     B12, folate, TSH, ammonia unremarkable     Limit sedating medications and narcotics     Delirium precautions     No indication for further neurological diagnostic testing at this time, but will follow clinically at this time. Patient is resolved to her neurological baseline with exception of some delirium.    At this time, neurology will sign-off. Please feel free to call us back if we can be of any further assistance.      I have discussed the above with the patient   Time spent with patient: 30min    MDM  Reviewed: previous chart, nursing note and vitals  Reviewed previous: labs and CT scan  Interpretation: labs and CT scan  Total time providing critical care:  minutes. This excludes time spent performing separately reportable procedures and services.  Consults: neurology         Vandana Darden MD   Neurology

## 2023-11-25 NOTE — OUTREACH NOTE
Prep Survey      Flowsheet Row Responses   [facility] facility patient discharged from? [place]   Is LACE score < 7 ? No   Eligibility Readm Mgmt   Discharge diagnosis Ischemic rest pain of lower extremity   Does the patient have one of the following disease processes/diagnoses(primary or secondary)? Other   Does the patient have Home health ordered? No   Is there a DME ordered? No   General alerts for this patient HD MWF   Prep survey completed? Yes            Shawna OSBORN - Registered Nurse

## 2023-11-25 NOTE — PAYOR COMM NOTE
"DC HOME 11-24-23    Harmony Montoya (73 y.o. Female)       Date of Birth   1950    Social Security Number       Address   [address] [facility] [address]    Home Phone   [phone]    MRN   [id number]       Mandaen   Amish    Marital Status   Single                            Admission Date   11/15/23    Admission Type   Emergency    Admitting Provider   Da Alexander MD    Attending Provider       Department, Room/Bed   [facility] 3A, 359/2       Discharge Date   11/24/2023    Discharge Disposition   Home or Self Care    Discharge Destination                                 Attending Provider: (none)   Allergies: Statins    Isolation: None   Infection: None   Code Status: Prior    Ht: 160 cm (63\")   Wt: 59.4 kg (131 lb)    Admission Cmt: None   Principal Problem: Ischemic rest pain of lower extremity [M79.606,I99.8]                   Active Insurance as of 11/15/2023       Primary Coverage       Payor Plan Insurance Group Employer/Plan Group    [facility] [id number]        Payor Plan Address Payor Plan Phone Number Payor Plan Fax Number Effective Dates    [address] [phone]  1/1/2023 - None Entered    [facility] [id number]         Subscriber Name Subscriber Birth Date Member ID       HARMONY MONTOYA 1950 [id number]                     Emergency Contacts        (Rel.) Home Phone Work Phone Mobile Phone    Enrique Montoya (Brother) [phone] -- [phone]    JanReuben (Brother) -- -- [phone]    Magaly Montoya (Relative) [phone] -- [phone]    Javier Montoya (Relative) -- -- [phone]                 Discharge Summary        Da Alexander MD at 11/24/23 [address] [place] [address] Medicine Services  DISCHARGE SUMMARY       Date of Admission: 11/15/2023  Date of Discharge:  11/24/2023  Primary Care Physician: Provider, No Known    Presenting Problem/History of Present Illness:    73 year old " "female with PMH of ESRD, DM 2 ID, CAD, HTN, that presents to the ER with complaints of pain in left leg and inability to walk. She has had pain for about 5 weeks and has required help to walk due to pain. Yesterday she visited with Dr Juan Miguel Iraheta, general surgeon who wrote: \"I cannot feel for pulse due to severe pain of the foot. She has had no vascular studies done but highly suspect significant peripheral vascular disease. I recommend that she go to [place] or [place] ER where there is vascular intervention capabilities soon as possible. \"     She was evaluated in the ER with US arterial doppler LE that is reported as: Chronic bilateral SFA and popliteal artery occlusions. There is reconstitution of flow in both tibial vessels in the lower   Extremities.     Dr Villagomez was consulted and is recommending admission to medical services, anticoagulation and possible revascularization versus amputation.     Dr Villgaomez saw her here and decided to follow up as outpatient. Cardiology did lexiscan which was read as: \"Large area of infarction noted involving the anterior apical apical lateral and apical septal region consistent with occlusion of the proximal to mid left anterior descending coronary artery. No areas of ischemia identified\"    Cardiology recommended outpatient follow up.    Pt will receive HD today then go home.                          Final Discharge Diagnoses:  Active Hospital Problems    Diagnosis     **Ischemic rest pain of lower extremity     Acute encephalopathy     Hypertensive encephalopathy     End stage renal disease on dialysis     Type 2 diabetes mellitus, with long-term current use of insulin     Primary hypertension     Coronary artery disease involving native coronary artery of native heart without angina pectoris     Chronic GERD        Consults:     Procedures Performed:     Pertinent Test Results:   Results for orders placed during the hospital encounter of 11/15/23    Adult " Transthoracic Echo Complete W/ Cont if Necessary Per Protocol    Interpretation Summary    Left ventricular systolic function is low normal. Left ventricular ejection fraction appears to be 41 - 45%.    The following left ventricular wall segments are akinetic: apical anterior, apical lateral, apical inferior, apical septal, apex and mid anteroseptal.    Left ventricular diastolic function was indeterminate.    Estimated right ventricular systolic pressure from tricuspid regurgitation is normal (<35 mmHg).      Imaging Results (All)       Procedure Component Value Units Date/Time    CT Head Without Contrast [[id number]] Collected: 11/23/23 1324     Updated: 11/23/23 1332    Narrative:      Exam: CT HEAD WO CONTRAST- 11/23/2023 12:29 PM CST     HISTORY: Mental status change, unknown cause; N18.6-End stage renal  disease; Z99.2-Dependence on renal dialysis; I70.209-Unspecified  atherosclerosis of native arteries of extremities, unspecified  extremity; R52-Pain, unspecified; M79.606-Pain in leg, unspecified;  I99.8-Other disorder of circulatory system; Z74.09-Other reduced  mobility       DOSE LENGTH PRODUCT: 820 mGy cm. Automated exposure control was also  utilized to decrease patient radiation dose.     Technique:   Helically acquired CT of the brain without IV contrast was performed.  Sagittal and coronal reformations are also provided for review. Soft  tissue and bone kernels are available for interpretation.     Comparison: None.     Findings:      Ventricles and extra-axial CSF spaces are normal in size.     No intraparenchymal or extra-axial hemorrhage.     Gray-white matter differentiation is preserved. Moderate periventricular  white matter low-attenuation. Remote appearing left caudate head lacunar  infarct.     Orbits are grossly unremarkable. Paranasal sinuses are grossly clear.  Mastoid air cells are grossly clear.     No suspicious calvarial or extracranial soft tissue abnormality.     Other: Vascular  calcifications.       Impression:      Impression:       No acute intracranial hemorrhage, mass effect, or large vascular  territorial infarct.     Moderate periventricular white matter low-attenuation, most likely  chronic small vessel ischemic changes.     Remote appearing left caudate head lacunar infarct.     This report was signed and finalized on 11/23/2023 1:28 PM CST by Malik Woods.        US Arterial Doppler Lower Extremity Complete [[id number]] Collected: 11/15/23 1616     Updated: 11/15/23 1621    Narrative:         History: PAD     Comments: Grayscale imaging as well as color flow duplex were used to  evaluate bilateral lower extremity arterial systems.     On the right, the peak systolic velocity in the common femoral artery  measured 240.6 cm/s. In the profunda femoris artery measured 200.7 cm/s.  The entire SFA and popliteal artery are occluded. The posterior tibial  artery is also occluded. In the anterior tibial artery measured 63.3  cm/s.     On the left, the peak systolic velocity in the common femoral artery  measured 39.1 cm/s. In the profunda femoris artery measured 64 cm/s. In  the proximal SFA measured 21.2 cm/s. The mid to distal SFA and popliteal  artery are occluded. In the posterior tibial artery measured 13 cm/s. In  the anterior tibial artery measured 32.4 cm/s.       Impression:      Chronic bilateral SFA and popliteal artery occlusions. There  is reconstitution of flow in both tibial vessels in the lower  extremities.     This report was signed and finalized on 11/15/2023 4:18 PM CST by Dr. Kwame Villagomez MD.       XR Toe 2+ View Left [[id number]] Collected: 11/15/23 1306     Updated: 11/15/23 1311    Narrative:      EXAMINATION: XR TOE 2+ VW LEFT- 11/15/2023 1:06 PM CST     HISTORY: Concern for osteomyelitis left fourth toe from possible dry  gangrene.     REPORT: 3 views of the left toes were obtained.     COMPARISON: There are no correlative imaging studies for comparison.      There is diffuse osteopenia, no fracture is identified. No bony  destruction is identified and the joint spaces are preserved. There is  moderate overgrowth of the first metatarsal head with bunion formation.  Vascular calcifications are present.       Impression:      No acute osseous abnormality, no evidence of osteomyelitis.  There is diffuse osteopenia.     This report was signed and finalized on 11/15/2023 1:08 PM CST by Dr. Rafael Amor MD.             LAB RESULTS:      Lab 11/24/23  0544 11/23/23  0454 11/22/23  0903 11/20/23  0927 11/20/23  0603 11/20/23  0020 11/19/23  1903 11/19/23  1210 11/19/23  0651 11/18/23  1543 11/18/23  0655   WBC 11.17* 11.87* 11.86*  --   --   --   --   --  8.11  --  8.52   HEMOGLOBIN 9.3* 9.1* 10.5*  --   --   --   --   --  10.3*  --  10.0*   HEMATOCRIT 29.2* 29.9* 34.7  --   --   --   --   --  33.0*  --  31.9*   PLATELETS 264 237 225  --   --   --   --   --  206  --  188   NEUTROS ABS 7.88* 8.19* 8.74*  --   --   --   --   --  4.92  --  5.61   IMMATURE GRANS (ABS) 0.04 0.06* 0.05  --   --   --   --   --  0.03  --  0.02   LYMPHS ABS 1.52 1.82 1.86  --   --   --   --   --  1.88  --  1.52   MONOS ABS 1.43* 1.48* 0.95*  --   --   --   --   --  1.08*  --  1.23*   EOS ABS 0.25 0.26 0.20  --   --   --   --   --  0.15  --  0.09   MCV 99.0* 101.7* 102.1*  --   --   --   --   --  99.7*  --  99.4*   APTT  --   --   --  51.7* 112.4* 55.4* 108.9* 54.5* 93.2*   < > 44.4*    < > = values in this interval not displayed.         Lab 11/24/23  0544 11/23/23  1649 11/23/23  0454 11/22/23  0903 11/19/23  0651 11/18/23  0655   SODIUM 136  --  133* 132* 138 135*   POTASSIUM 4.4  --  4.3 4.0 4.1 4.2   CHLORIDE 99  --  95* 94* 98 96*   CO2 23.0  --  24.0 21.0* 27.0 26.0   ANION GAP 14.0  --  14.0 17.0* 13.0 13.0   BUN 66*  --  58* 48* 42* 27*   CREATININE 6.29*  --  5.44* 4.39* 3.85* 2.99*   EGFR 6.6*  --  7.8* 10.1* 11.8* 16.0*   GLUCOSE 144*  --  221* 199* 236* 212*   CALCIUM 9.5  --  9.1 9.3  "9.6 9.2   TSH  --  0.906  --   --   --   --          Lab 11/24/23  0544 11/23/23  0454 11/22/23  0903   TOTAL PROTEIN 6.7 6.6 7.2   ALBUMIN 3.6 3.5 3.5   GLOBULIN 3.1 3.1 3.7   ALT (SGPT) 8 10 10   AST (SGOT) 15 16 21   BILIRUBIN 0.3 0.3 0.3   ALK PHOS 116 119* 124*         Lab 11/20/23  0927 11/20/23  0604   HSTROP T 129* 44*             Lab 11/23/23  1649 11/20/23  0927   FOLATE 5.04  --    VITAMIN B 12 1,967*  --    ABO TYPING  --  B   RH TYPING  --  Positive   ANTIBODY SCREEN  --  Negative         Brief Urine Lab Results       None          Microbiology Results (last 10 days)       Procedure Component Value - Date/Time    Blood Culture - Blood, Hand, Digit Right [[id number]]  (Normal) Collected: 11/15/23 1237    Lab Status: Final result Specimen: Blood from Hand, Digit Right Updated: 11/20/23 1301     Blood Culture No growth at 5 days    Blood Culture - Blood, Hand, Digit Right [[id number]]  (Normal) Collected: 11/15/23 1158    Lab Status: Final result Specimen: Blood from Hand, Digit Right Updated: 11/20/23 1230     Blood Culture No growth at 5 days            Hospital Course:       Physical Exam on Discharge:  /66 (BP Location: Right arm, Patient Position: Lying)   Pulse 81   Temp 97.8 °F (36.6 °C) (Axillary)   Resp 16   Ht 160 cm (63\")   Wt 59.4 kg (131 lb)   SpO2 100%   BMI 23.21 kg/m²   Physical Exam      Condition on Discharge:     Discharge Disposition:  Home or Self Care    Discharge Medications:     Discharge Medications        Continue These Medications        Instructions Start Date   aspirin 81 MG chewable tablet   81 mg, Oral, Daily      carvedilol 25 MG tablet  Commonly known as: COREG   25 mg, Oral, 2 Times Daily With Meals      clopidogrel 75 MG tablet  Commonly known as: PLAVIX   75 mg, Oral, Daily      diphenhydrAMINE 25 mg capsule  Commonly known as: BENADRYL   25 mg, Oral, Daily      insulin glargine 100 UNIT/ML injection  Commonly known as: LANTUS, SEMGLEE   7 Units, Subcutaneous, " Daily      insulin regular 100 UNIT/ML injection  Commonly known as: humuLIN R,novoLIN R   6 Units, Subcutaneous, Daily Before Supper      lisinopril 20 MG tablet  Commonly known as: PRINIVIL,ZESTRIL   10 mg, Oral, Daily      omeprazole 40 MG capsule  Commonly known as: priLOSEC   40 mg, Oral, Daily      Polyvinyl Alcohol-Povidone PF 1.4-0.6 % ophthalmic solution  Commonly known as: HYPOTEARS   1 drop, Both Eyes, 4 Times Daily      Vitamin C 500 MG capsule   2 capsules, Oral, Daily      vitamin D3 125 MCG (5000 UT) capsule capsule   5,000 Units, Oral, Daily               This patient has current or prior documentation of an left ventricular ejection fraction (LVEF) of less than or equal to 40%.            Discharge Diet:     Activity at Discharge:     Follow-up Appointments:   No future appointments.    Test Results Pending at Discharge:     Electronically signed by Da Alexander MD, 11/24/23, 14:15 CST.    Time:  minutes.           Electronically signed by Da Alexander MD at 11/24/23 7639

## 2023-11-25 NOTE — THERAPY DISCHARGE NOTE
Acute Care - Physical Therapy Discharge Summary  [facility]       Patient Name: Harmony Montoya  : 1950  MRN: [id number]    Today's Date: 2023                 Admit Date: 11/15/2023      PT Recommendation and Plan    Visit Dx:    ICD-10-CM ICD-9-CM   1. End stage renal disease on dialysis  N18.6 585.6    Z99.2 V45.11   2. Arterial occlusion, lower extremity  I70.209 444.22   3. Intractable pain  R52 780.96   4. Ischemic rest pain of lower extremity  M79.606 729.5    I99.8 459.9   5. Impaired mobility [Z74.09]  Z74.09 799.89   6. Coronary artery disease involving native coronary artery of native heart without angina pectoris  I25.10 414.01        Outcome Measures       Row Name 23 1100             How much help from another person do you currently need...    Turning from your back to your side while in flat bed without using bedrails? 1  -TB      Moving from lying on back to sitting on the side of a flat bed without bedrails? 1  -TB      Moving to and from a bed to a chair (including a wheelchair)? 1  -TB      Standing up from a chair using your arms (e.g., wheelchair, bedside chair)? 1  -TB      Climbing 3-5 steps with a railing? 1  -TB      To walk in hospital room? 1  -TB      AM-PAC 6 Clicks Score (PT) 6  -TB      Highest Level of Mobility Goal 2 --> Bed activities/dependent transfer  -TB         Functional Assessment    Outcome Measure Options AM-PAC 6 Clicks Basic Mobility (PT)  -TB                User Key  (r) = Recorded By, (t) = Taken By, (c) = Cosigned By      Initials Name Provider Type    TB Yasmin Cortes, PTA Physical Therapist Assistant                         PT Rehab Goals       Row Name 23 1615             Bed Mobility Goal 1 (PT)    Activity/Assistive Device (Bed Mobility Goal 1, PT) sit to supine/supine to sit  -KJ      [place] Level/Cues Needed (Bed Mobility Goal 1, PT) minimum assist (75% or more patient effort)  -KJ      Time Frame (Bed Mobility Goal 1, PT)  long term goal (LTG);10 days  -KJ      Progress/Outcomes (Bed Mobility Goal 1, PT) goal not met  -KJ         Transfer Goal 1 (PT)    Activity/Assistive Device (Transfer Goal 1, PT) sit-to-stand/stand-to-sit;bed-to-chair/chair-to-bed;walker, rolling  -KJ      [place] Level/Cues Needed (Transfer Goal 1, PT) minimum assist (75% or more patient effort)  -KJ      Time Frame (Transfer Goal 1, PT) long term goal (LTG);10 days  -KJ      Progress/Outcome (Transfer Goal 1, PT) goal not met  -KJ                User Key  (r) = Recorded By, (t) = Taken By, (c) = Cosigned By      Initials Name Provider Type Discipline    Alyse Cerda, PTA Physical Therapist Assistant PT                        PT Discharge Summary  Anticipated Discharge Disposition (PT): home, home with assist  Outcomes Achieved: Refer to plan of care for updates on goals achieved  Discharge Destination: Home, Home with assist      Alyse Tomlinson PTA   11/25/2023

## 2023-11-26 NOTE — THERAPY DISCHARGE NOTE
Acute Care - Physical Therapy Discharge Summary  [facility]       Patient Name: Harmony Montoya  : 1950  MRN: [id number]    Today's Date: 2023                 Admit Date: 11/15/2023      PT Recommendation and Plan    Visit Dx:    ICD-10-CM ICD-9-CM   1. End stage renal disease on dialysis  N18.6 585.6    Z99.2 V45.11   2. Arterial occlusion, lower extremity  I70.209 444.22   3. Intractable pain  R52 780.96   4. Ischemic rest pain of lower extremity  M79.606 729.5    I99.8 459.9   5. Impaired mobility [Z74.09]  Z74.09 799.89   6. Coronary artery disease involving native coronary artery of native heart without angina pectoris  I25.10 414.01                PT Rehab Goals       Row Name 23 1151             Bed Mobility Goal 1 (PT)    Activity/Assistive Device (Bed Mobility Goal 1, PT) sit to supine/supine to sit  -NW      [place] Level/Cues Needed (Bed Mobility Goal 1, PT) minimum assist (75% or more patient effort)  -NW      Time Frame (Bed Mobility Goal 1, PT) long term goal (LTG);10 days  -NW      Progress/Outcomes (Bed Mobility Goal 1, PT) goal not met  -NW         Transfer Goal 1 (PT)    Activity/Assistive Device (Transfer Goal 1, PT) sit-to-stand/stand-to-sit;bed-to-chair/chair-to-bed;walker, rolling  -NW      [place] Level/Cues Needed (Transfer Goal 1, PT) minimum assist (75% or more patient effort)  -NW      Time Frame (Transfer Goal 1, PT) long term goal (LTG);10 days  -NW      Progress/Outcome (Transfer Goal 1, PT) goal not met  -NW                User Key  (r) = Recorded By, (t) = Taken By, (c) = Cosigned By      Initials Name Provider Type Discipline    Violeta Paniagua, PTA Physical Therapist Assistant PT                        PT Discharge Summary  Anticipated Discharge Disposition (PT): home with home health  Reason for Discharge: Discharge from facility  Outcomes Achieved: Refer to plan of care for updates on goals achieved  Discharge Destination: Home with home  health      Violetaskylar Jaquez, PTA   11/26/2023

## 2023-11-28 ENCOUNTER — READMISSION MANAGEMENT (OUTPATIENT)
Dept: CALL CENTER | Facility: HOSPITAL | Age: 73
End: 2023-11-28
Payer: OTHER GOVERNMENT

## 2023-11-28 NOTE — OUTREACH NOTE
Medical Week 1 Survey      Flowsheet Row Responses   [facility] facility patient discharged from? [place]   Does the patient have one of the following disease processes/diagnoses(primary or secondary)? Other   Week 1 attempt successful? No   Unsuccessful attempts Attempt 1            Brenda Baird Registered Nurse

## 2023-12-05 ENCOUNTER — READMISSION MANAGEMENT (OUTPATIENT)
Dept: CALL CENTER | Facility: HOSPITAL | Age: 73
End: 2023-12-05
Payer: OTHER GOVERNMENT
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
## 2023-12-12 ENCOUNTER — READMISSION MANAGEMENT (OUTPATIENT)
Dept: CALL CENTER | Facility: HOSPITAL | Age: 73
End: 2023-12-12
Payer: OTHER GOVERNMENT

## 2023-12-12 NOTE — OUTREACH NOTE
Medical Week 3 Survey      Flowsheet Row Responses   [facility] patient discharged from? [place]   Does the patient have one of the following disease processes/diagnoses(primary or secondary)? Other   Week 3 attempt successful? No   Unsuccessful attempts Attempt 1  [spoke with Reuben, brother who reports that pt is admitted at [facility] in [place]]   Revoke Readmitted            ANY WARD - Registered Nurse

## 2023-12-14 ENCOUNTER — TELEPHONE (OUTPATIENT)
Dept: VASCULAR SURGERY | Facility: CLINIC | Age: 73
End: 2023-12-14
Payer: OTHER GOVERNMENT

## 2023-12-14 NOTE — TELEPHONE ENCOUNTER
Spoke with brother concerning pts referral. He stated patient was currently in the [facility] . Once she is dismissed we can get pt scheduled to see Dr. Villagomez.

## 2024-01-04 ENCOUNTER — TELEPHONE (OUTPATIENT)
Dept: VASCULAR SURGERY | Facility: CLINIC | Age: 74
End: 2024-01-04
Payer: OTHER GOVERNMENT

## 2024-01-12 ENCOUNTER — TELEPHONE (OUTPATIENT)
Dept: VASCULAR SURGERY | Facility: CLINIC | Age: 74
End: 2024-01-12
Payer: OTHER GOVERNMENT

## 2024-01-12 NOTE — TELEPHONE ENCOUNTER
Left message for a return call to see if patient is now ready to schedule her appointment with Dr. Villagomez.

## 2024-01-19 ENCOUNTER — TELEPHONE (OUTPATIENT)
Dept: VASCULAR SURGERY | Facility: CLINIC | Age: 74
End: 2024-01-19
Payer: OTHER GOVERNMENT

## 2024-01-22 ENCOUNTER — TELEPHONE (OUTPATIENT)
Dept: VASCULAR SURGERY | Facility: CLINIC | Age: 74
End: 2024-01-22
Payer: OTHER GOVERNMENT

## 2024-01-22 NOTE — TELEPHONE ENCOUNTER
UNABLE TO REACH PATIENT TO GET SCHEDULED FROM REFERRAL SENT IN NOVEMBER. CALLED SECONDARY CONTACT, STATED THAT SHE DOSENT LIVE THERE BUT WILL GIVE HER MESSAGE TO RETURN CALL WHEN HE SEES HER.